# Patient Record
Sex: FEMALE | Race: WHITE | NOT HISPANIC OR LATINO | Employment: OTHER | URBAN - METROPOLITAN AREA
[De-identification: names, ages, dates, MRNs, and addresses within clinical notes are randomized per-mention and may not be internally consistent; named-entity substitution may affect disease eponyms.]

---

## 2017-01-23 ENCOUNTER — DOCUMENTATION ONLY (OUTPATIENT)
Dept: FAMILY MEDICINE | Facility: CLINIC | Age: 82
End: 2017-01-23

## 2017-01-23 NOTE — PROGRESS NOTES
Health Maintenance Due   Topic Date Due    TETANUS VACCINE  02/11/1946    DEXA SCAN  09/26/2016

## 2017-01-24 ENCOUNTER — OFFICE VISIT (OUTPATIENT)
Dept: FAMILY MEDICINE | Facility: CLINIC | Age: 82
End: 2017-01-24
Payer: MEDICARE

## 2017-01-24 VITALS
WEIGHT: 147.25 LBS | BODY MASS INDEX: 25.14 KG/M2 | RESPIRATION RATE: 16 BRPM | OXYGEN SATURATION: 95 % | DIASTOLIC BLOOD PRESSURE: 81 MMHG | HEIGHT: 64 IN | TEMPERATURE: 98 F | SYSTOLIC BLOOD PRESSURE: 130 MMHG | HEART RATE: 73 BPM

## 2017-01-24 DIAGNOSIS — N30.01 ACUTE CYSTITIS WITH HEMATURIA: Primary | ICD-10-CM

## 2017-01-24 DIAGNOSIS — N39.0 CHRONIC UTI: ICD-10-CM

## 2017-01-24 PROCEDURE — 87088 URINE BACTERIA CULTURE: CPT

## 2017-01-24 PROCEDURE — 87086 URINE CULTURE/COLONY COUNT: CPT

## 2017-01-24 PROCEDURE — 87077 CULTURE AEROBIC IDENTIFY: CPT

## 2017-01-24 PROCEDURE — 87186 SC STD MICRODIL/AGAR DIL: CPT

## 2017-01-24 PROCEDURE — 81002 URINALYSIS NONAUTO W/O SCOPE: CPT | Mod: S$GLB,,, | Performed by: NURSE PRACTITIONER

## 2017-01-24 PROCEDURE — 99213 OFFICE O/P EST LOW 20 MIN: CPT | Mod: 25,S$GLB,, | Performed by: NURSE PRACTITIONER

## 2017-01-24 RX ORDER — CIPROFLOXACIN 250 MG/1
250 TABLET, FILM COATED ORAL 2 TIMES DAILY
Qty: 20 TABLET | Refills: 0 | Status: SHIPPED | OUTPATIENT
Start: 2017-01-24 | End: 2017-02-03

## 2017-01-24 NOTE — MEDICAL/APP STUDENT
Subjective:       Patient ID: Ama Lang is a 88 y.o. female.    Chief Complaint: Urinary Tract Infection    HPI   Ms Lang is a 88 year old women who presents with a 5 day history of abdominal pain. Pt endorses  urgerncy and frequency. Pt denies dysuria. Pt states that she has chronic UTI. Pt has taken one dose of her daughters hyoscyamine sulfate 0.125 mg and felt better. Would like a prescription for that today.     Pt has had a flu shot this year.     Review of Systems   Constitutional: Negative for activity change, appetite change, chills and fatigue.   HENT: Negative.    Eyes: Negative.    Respiratory: Negative for cough, chest tightness and shortness of breath.    Cardiovascular: Negative for chest pain, palpitations and leg swelling.   Gastrointestinal: Positive for abdominal pain and diarrhea. Negative for constipation, nausea and vomiting.   Genitourinary: Positive for frequency and urgency. Negative for difficulty urinating, dysuria and flank pain.   Musculoskeletal: Negative.    Skin: Negative.    Allergic/Immunologic: Negative.    Neurological: Negative.    Hematological: Negative.    Psychiatric/Behavioral: Negative.          Objective:      Physical Exam   Constitutional: She is oriented to person, place, and time. She appears well-developed and well-nourished.   HENT:   Head: Normocephalic and atraumatic.   Eyes: Conjunctivae and EOM are normal. Pupils are equal, round, and reactive to light.   Neck: Normal range of motion. Neck supple.   Cardiovascular: Normal rate and regular rhythm.  Exam reveals no friction rub.    Murmur heard.  Pulmonary/Chest: Effort normal and breath sounds normal. No respiratory distress. She has no wheezes. She has no rales.   Abdominal: Soft. Bowel sounds are normal. She exhibits no distension. There is no tenderness.   Musculoskeletal: Normal range of motion.   Neurological: She is alert and oriented to person, place, and time.   Skin: Skin is warm and dry. She  is not diaphoretic.   Psychiatric: She has a normal mood and affect. Her behavior is normal.         Urine   Color - Cloudy straw  Sp gravity - 1.010  PH 6   Leukocytes 2+  Nitrites positive  Protein +3  Blood 250

## 2017-01-24 NOTE — MR AVS SNAPSHOT
[facility]  [address]  [place] [address]  Phone: [phone]  Fax: [phone]                  Ama Lang   2017 8:40 AM   Office Visit    Description:  Female : 1928   Provider:  KIMBERLY Pulido   Department:  [facility]           Reason for Visit     Urinary Tract Infection           Diagnoses this Visit        Comments    Acute cystitis with hematuria    -  Primary     Chronic UTI                To Do List           Future Appointments        Provider Department Dept Phone    2/3/2017 9:40 AM KIMBERLY Pulido [facility] [phone]    2017 11:15 AM Arlene Calhoun MD [facility] - Urology [phone]      Goals (5 Years of Data)     None      Follow-Up and Disposition     Return in about 10 days (around 2/3/2017).    Follow-up and Disposition History       These Medications        Disp Refills Start End    ciprofloxacin HCl (CIPRO) 250 MG tablet 20 tablet 0 2017 2/3/2017    Take 1 tablet (250 mg total) by mouth 2 (two) times daily. - Oral    Pharmacy: [place] [address] - [place] [address]  #: [phone]         [facility] On Call     [facility] On Call Nurse Care Line -  Assistance  Registered nurses in the [facility] On Call Center provide clinical advisement, health education, appointment booking, and other advisory services.  Call for this free service at [phone].             Medications           Message regarding Medications     Verify the changes and/or additions to your medication regime listed below are the same as discussed with your clinician today.  If any of these changes or additions are incorrect, please notify your healthcare provider.        START taking these NEW medications        Refills    ciprofloxacin HCl (CIPRO) 250 MG tablet 0    Sig: Take 1 tablet (250 mg total) by mouth 2 (two) times daily.    Class: Normal    Route: Oral          "  Verify that the below list of medications is an accurate representation of the medications you are currently taking.  If none reported, the list may be blank. If incorrect, please contact your healthcare provider. Carry this list with you in case of emergency.           Current Medications     ACETAMINOPHEN (TYLENOL 8 HOUR ORAL) Take 2 capsules by mouth daily as needed.    ACETAMINOPHEN WITH CODEINE (TYLENOL-CODEINE #3 ORAL) Take by mouth every 4 to 6 hours as needed.    apixaban (ELIQUIS) 2.5 mg Tab Take 2.5 mg by mouth 2 (two) times daily.     aspirin (ECOTRIN) 81 MG EC tablet Take 81 mg by mouth once daily.    atenolol (TENORMIN) 50 MG tablet Take 1 tablet (50 mg total) by mouth 2 (two) times daily.    CALCIUM ORAL Take 1 tablet by mouth once daily.    levothyroxine (SYNTHROID) 50 MCG tablet TAKE 1 TABLET BY MOUTH DAILY    lisinopril 10 MG tablet Take 10 mg by mouth once daily.    multivitamin (ONE DAILY MULTIVITAMIN) per tablet Take 1 tablet by mouth once daily.    PATADAY 0.2 % Drop INSTILL 1 DROP IN EACH EYE ONCE DAILY    ranitidine (ZANTAC) 150 MG capsule Take 1 capsule (150 mg total) by mouth 2 (two) times daily.    simvastatin (ZOCOR) 20 MG tablet TAKE 1 TABLET (20 MG TOTAL) BY MOUTH EVERY EVENING.    triamcinolone acetonide 0.025% (KENALOG) 0.025 % cream 1 application 2 (two) times daily as needed. Apply to affected area    VIT C/VIT E/LUTEIN/MIN/OMEGA-3 (OCUVITE ORAL) Take 1 tablet by mouth once daily.    ciprofloxacin HCl (CIPRO) 250 MG tablet Take 1 tablet (250 mg total) by mouth 2 (two) times daily.           Clinical Reference Information           Vital Signs - Last Recorded  Most recent update: 1/24/2017  9:04 AM by Pippa Mcclain MA    BP Pulse Temp Resp Ht Wt    130/81 (BP Location: Right arm, Patient Position: Sitting, BP Method: Automatic) 73 97.7 °F (36.5 °C) (Oral) 16 5' 4" (1.626 m) 66.8 kg (147 lb 4.3 oz)    SpO2 BMI             95% 25.28 kg/m2         Blood Pressure          Most " Recent Value    BP  130/81      Allergies as of 1/24/2017     No Known Drug Allergies      Immunizations Administered on Date of Encounter - 1/24/2017     None      Orders Placed During Today's Visit      Normal Orders This Visit    Ambulatory referral to Urology     POCT urine dipstick without microscope     Urine culture

## 2017-01-24 NOTE — PROGRESS NOTES
Subjective:       Patient ID: Ama Lang is a 88 y.o. female.    Chief Complaint: Urinary Tract Infection  Medical/FLORES Student  Cosign Needed   Encounter Date: 1/24/2017  Balbir Helms        Subjective:        Patient ID: Ama Lang is a 88 y.o. female.     Chief Complaint: Urinary Tract Infection     HPI   Ms Lang is a 88 year old women who presents with a 5 day history of abdominal pain. Pt endorses urgerncy and frequency. Pt denies dysuria. Pt states that she has chronic UTI. Pt has taken one dose of her daughters hyoscyamine sulfate 0.125 mg and felt better. Would like a prescription for that today.      Pt has had a flu shot this year.      Review of Systems   Constitutional: Negative for activity change, appetite change, chills and fatigue.   HENT: Negative.   Eyes: Negative.   Respiratory: Negative for cough, chest tightness and shortness of breath.   Cardiovascular: Negative for chest pain, palpitations and leg swelling.   Gastrointestinal: Positive for abdominal pain and diarrhea. Negative for constipation, nausea and vomiting.   Genitourinary: Positive for frequency and urgency. Negative for difficulty urinating, dysuria and flank pain.   Musculoskeletal: Negative.   Skin: Negative.   Allergic/Immunologic: Negative.   Neurological: Negative.   Hematological: Negative.   Psychiatric/Behavioral: Negative.          Objective:      Physical Exam   Constitutional: She is oriented to person, place, and time. She appears well-developed and well-nourished.   HENT:   Head: Normocephalic and atraumatic.   Eyes: Conjunctivae and EOM are normal. Pupils are equal, round, and reactive to light.   Neck: Normal range of motion. Neck supple.   Cardiovascular: Normal rate and regular rhythm. Exam reveals no friction rub.   Murmur heard.  Pulmonary/Chest: Effort normal and breath sounds normal. No respiratory distress. She has no wheezes. She has no rales.   Abdominal: Soft. Bowel sounds are normal. She  exhibits no distension. There is no tenderness.   Musculoskeletal: Normal range of motion. No pain or tenderness on percussion of spine and bilateral CVA.   Neurological: She is alert and oriented to person, place, and time.   Skin: Skin is warm and dry. She is not diaphoretic.   Psychiatric: She has a normal mood and affect. Her behavior is normal.          Urine   Color - Cloudy straw  Sp gravity - 1.010  PH 6   Leukocytes 2+  Nitrites positive  Protein +3  Blood 250             Electronically signed by Balbir Helms at 1/24/2017  9:40 AM                HPI  Review of Systems    Objective:      Physical Exam    Assessment:       1. Acute cystitis with hematuria    2. Chronic UTI        Plan:       Acute cystitis with hematuria  -     ciprofloxacin HCl (CIPRO) 250 MG tablet; Take 1 tablet (250 mg total) by mouth 2 (two) times daily.  Dispense: 20 tablet; Refill: 0  -     POCT urine dipstick without microscope  -     Urine culture    Chronic UTI  -     Ambulatory referral to Urology    I also saw patient face to face and agree with medical student's H&P.        10 days

## 2017-01-25 LAB
BILIRUB SERPL-MCNC: NEGATIVE MG/DL
BLOOD URINE, POC: 250
COLOR, POC UA: ABNORMAL
GLUCOSE UR QL STRIP: NORMAL
KETONES UR QL STRIP: NEGATIVE
LEUKOCYTE ESTERASE URINE, POC: ABNORMAL
NITRITE, POC UA: POSITIVE
PH, POC UA: 6
PROTEIN, POC: 30
SPECIFIC GRAVITY, POC UA: 1.01
UROBILINOGEN, POC UA: NEGATIVE

## 2017-01-26 LAB — BACTERIA UR CULT: NORMAL

## 2017-02-03 ENCOUNTER — OFFICE VISIT (OUTPATIENT)
Dept: FAMILY MEDICINE | Facility: CLINIC | Age: 82
End: 2017-02-03
Payer: MEDICARE

## 2017-02-03 ENCOUNTER — DOCUMENTATION ONLY (OUTPATIENT)
Dept: FAMILY MEDICINE | Facility: CLINIC | Age: 82
End: 2017-02-03

## 2017-02-03 VITALS
SYSTOLIC BLOOD PRESSURE: 133 MMHG | BODY MASS INDEX: 25.11 KG/M2 | RESPIRATION RATE: 16 BRPM | HEART RATE: 72 BPM | WEIGHT: 147.06 LBS | HEIGHT: 64 IN | OXYGEN SATURATION: 97 % | TEMPERATURE: 99 F | DIASTOLIC BLOOD PRESSURE: 80 MMHG

## 2017-02-03 DIAGNOSIS — N30.01 ACUTE CYSTITIS WITH HEMATURIA: Primary | ICD-10-CM

## 2017-02-03 DIAGNOSIS — N28.89 LEFT RENAL MASS: ICD-10-CM

## 2017-02-03 DIAGNOSIS — J30.1 NON-SEASONAL ALLERGIC RHINITIS DUE TO POLLEN: ICD-10-CM

## 2017-02-03 DIAGNOSIS — H10.13 ALLERGIC CONJUNCTIVITIS, BILATERAL: ICD-10-CM

## 2017-02-03 LAB
BILIRUB SERPL-MCNC: NEGATIVE MG/DL
BILIRUB UR QL STRIP: NEGATIVE
BLOOD URINE, POC: NEGATIVE
CLARITY UR REFRACT.AUTO: CLEAR
COLOR UR AUTO: YELLOW
COLOR, POC UA: ABNORMAL
GLUCOSE UR QL STRIP: NEGATIVE
GLUCOSE UR QL STRIP: NORMAL
HGB UR QL STRIP: NEGATIVE
KETONES UR QL STRIP: NEGATIVE
KETONES UR QL STRIP: NEGATIVE
LEUKOCYTE ESTERASE UR QL STRIP: ABNORMAL
LEUKOCYTE ESTERASE URINE, POC: ABNORMAL
MICROSCOPIC COMMENT: NORMAL
NITRITE UR QL STRIP: NEGATIVE
NITRITE, POC UA: ABNORMAL
PH UR STRIP: 6 [PH] (ref 5–8)
PH, POC UA: 5
PROT UR QL STRIP: NEGATIVE
PROTEIN, POC: ABNORMAL
RBC #/AREA URNS AUTO: 1 /HPF (ref 0–4)
SP GR UR STRIP: 1 (ref 1–1.03)
SPECIFIC GRAVITY, POC UA: 1.01
SQUAMOUS #/AREA URNS AUTO: 1 /HPF
URN SPEC COLLECT METH UR: ABNORMAL
UROBILINOGEN UR STRIP-ACNC: NEGATIVE EU/DL
UROBILINOGEN, POC UA: NORMAL
WBC #/AREA URNS AUTO: 3 /HPF (ref 0–5)

## 2017-02-03 PROCEDURE — 81001 URINALYSIS AUTO W/SCOPE: CPT

## 2017-02-03 PROCEDURE — 99213 OFFICE O/P EST LOW 20 MIN: CPT | Mod: S$GLB,,, | Performed by: NURSE PRACTITIONER

## 2017-02-03 PROCEDURE — 81002 URINALYSIS NONAUTO W/O SCOPE: CPT | Mod: S$GLB,,, | Performed by: NURSE PRACTITIONER

## 2017-02-03 RX ORDER — FLUTICASONE PROPIONATE 50 MCG
2 SPRAY, SUSPENSION (ML) NASAL DAILY
Qty: 16 G | Refills: 11 | Status: SHIPPED | OUTPATIENT
Start: 2017-02-03 | End: 2018-03-05 | Stop reason: SDUPTHER

## 2017-02-03 RX ORDER — AMOXICILLIN AND CLAVULANATE POTASSIUM 875; 125 MG/1; MG/1
1 TABLET, FILM COATED ORAL 2 TIMES DAILY
Qty: 20 TABLET | Refills: 0 | Status: SHIPPED | OUTPATIENT
Start: 2017-02-03 | End: 2017-02-13

## 2017-02-03 NOTE — PROGRESS NOTES
Health Maintenance Due   Topic Date Due    TETANUS VACCINE  02/11/1946    DEXA SCAN  02/11/1968

## 2017-02-03 NOTE — PROGRESS NOTES
Subjective:       Patient ID: Ama Lang is a 88 y.o. female.    Chief Complaint: Follow-up    Urinary Tract Infection    This is a recurrent (Discussed using preventative in the future and seeing urology. ) problem. The current episode started 1 to 4 weeks ago. Progression since onset: symptoms are better.  The patient is experiencing no pain. There has been no fever. She is not sexually active. There is no history of pyelonephritis. Pertinent negatives include no hematuria, nausea, urgency, vomiting or bubble bath use. Associated symptoms comments: fatigue. She has tried antibiotics (treated with cipro X 10 days, culture showed germ sensitive to cipro. ) for the symptoms.   Has appointment to see Dr. Helton next week. Lost 5 pounds in the past year, but weight is pretty stable currently.     Has allergic rhinitis, with eye itching as well. Uses pataday, but ran out. Uses vicks nasal spray.   Review of Systems   Gastrointestinal: Negative for nausea and vomiting.   Genitourinary: Negative for hematuria and urgency.       Objective:    U/A in clinic shows positive for leukocytes, trace protein, no nitrites and no blood.   Physical Exam   Constitutional: She is oriented to person, place, and time. She appears well-developed and well-nourished. No distress.   HENT:   Head: Normocephalic and atraumatic.   Eyes: Conjunctivae are normal. Right eye exhibits no discharge. Left eye exhibits no discharge. No scleral icterus.   Cardiovascular: Normal rate, regular rhythm and normal heart sounds.  Exam reveals no gallop and no friction rub.    No murmur heard.  Pulmonary/Chest: Effort normal and breath sounds normal. No respiratory distress. She has no wheezes. She has no rales.   Musculoskeletal:   No spinal or CVA tenderness with percussion.    Neurological: She is alert and oriented to person, place, and time.   Skin: Skin is warm and dry. She is not diaphoretic.   Psychiatric: She has a normal mood and affect.  Her behavior is normal.   Nursing note and vitals reviewed.      Assessment:       1. Acute cystitis with hematuria    2. Left renal mass        Plan:       Acute cystitis with hematuria  -     POCT urine dipstick without microscope  -     Urinalysis; Future; Expected date: 2/3/17  -     amoxicillin-clavulanate 875-125mg (AUGMENTIN) 875-125 mg per tablet; Take 1 tablet by mouth 2 (two) times daily.  Dispense: 20 tablet; Refill: 0    Left renal mass  -     US Retroperitoneal Complete (Kidney and; Future; Expected date: 2/3/17

## 2017-02-03 NOTE — MR AVS SNAPSHOT
[facility]  [address]  [place] [address]  Phone: [phone]  Fax: [phone]                  Ama Lang   2/3/2017 9:40 AM   Office Visit    Description:  Female : 1928   Provider:  KIMBERLY Pulido   Department:  [facility]           Reason for Visit     Follow-up           Diagnoses this Visit        Comments    Acute cystitis with hematuria    -  Primary     Left renal mass         Non-seasonal allergic rhinitis due to pollen         Allergic conjunctivitis, bilateral                To Do List           Future Appointments        Provider Department Dept Phone    2017 10:00 AM [facility]- Ultrasound [phone]    2017 11:15 AM Arlene Calhoun MD [facility] - Urology [phone]      Goals (5 Years of Data)     None      Follow-Up and Disposition     Return if symptoms worsen or fail to improve.    Follow-up and Disposition History       These Medications        Disp Refills Start End    amoxicillin-clavulanate 875-125mg (AUGMENTIN) 875-125 mg per tablet 20 tablet 0 2/3/2017 2017    Take 1 tablet by mouth 2 (two) times daily. - Oral    Pharmacy: Dr. Tariff  - [facility] [address] Ph #: [phone]       PATADAY 0.2 % Drop 2.5 mL 11 2/3/2017     INSTILL 1 DROP IN EACH EYE ONCE DAILY    Pharmacy: Dr. Tariff  - [facility] [address] [address] Ph #: [phone]       fluticasone (FLONASE) 50 mcg/actuation nasal spray 16 g 11 2/3/2017     2 sprays by Each Nare route once daily. - Each Nare    Pharmacy: Dr. Tariff  - [facility] [address] [facility] [address] Ph #: [phone]         [facility] On Call     [facility] On Call Nurse Care Line -  Assistance  Registered nurses in the [facility] On Call Center provide clinical advisement, health education, appointment booking, and other advisory services.  Call for this free service at [phone].             Medications            Message regarding Medications     Verify the changes and/or additions to your medication regime listed below are the same as discussed with your clinician today.  If any of these changes or additions are incorrect, please notify your healthcare provider.        START taking these NEW medications        Refills    amoxicillin-clavulanate 875-125mg (AUGMENTIN) 875-125 mg per tablet 0    Sig: Take 1 tablet by mouth 2 (two) times daily.    Class: Normal    Route: Oral    fluticasone (FLONASE) 50 mcg/actuation nasal spray 11    Si sprays by Each Nare route once daily.    Class: Normal    Route: Each Nare      STOP taking these medications     ciprofloxacin HCl (CIPRO) 250 MG tablet Take 1 tablet (250 mg total) by mouth 2 (two) times daily.           Verify that the below list of medications is an accurate representation of the medications you are currently taking.  If none reported, the list may be blank. If incorrect, please contact your healthcare provider. Carry this list with you in case of emergency.           Current Medications     ACETAMINOPHEN (TYLENOL 8 HOUR ORAL) Take 2 capsules by mouth daily as needed.    ACETAMINOPHEN WITH CODEINE (TYLENOL-CODEINE #3 ORAL) Take by mouth every 4 to 6 hours as needed.    apixaban (ELIQUIS) 2.5 mg Tab Take 2.5 mg by mouth 2 (two) times daily.     aspirin (ECOTRIN) 81 MG EC tablet Take 81 mg by mouth once daily.    atenolol (TENORMIN) 50 MG tablet Take 1 tablet (50 mg total) by mouth 2 (two) times daily.    CALCIUM ORAL Take 1 tablet by mouth once daily.    levothyroxine (SYNTHROID) 50 MCG tablet TAKE 1 TABLET BY MOUTH DAILY    lisinopril 10 MG tablet Take 10 mg by mouth once daily.    multivitamin (ONE DAILY MULTIVITAMIN) per tablet Take 1 tablet by mouth once daily.    PATADAY 0.2 % Drop INSTILL 1 DROP IN EACH EYE ONCE DAILY    ranitidine (ZANTAC) 150 MG capsule Take 1 capsule (150 mg total) by mouth 2 (two) times daily.    simvastatin (ZOCOR) 20 MG tablet TAKE  "1 TABLET (20 MG TOTAL) BY MOUTH EVERY EVENING.    triamcinolone acetonide 0.025% (KENALOG) 0.025 % cream 1 application 2 (two) times daily as needed. Apply to affected area    VIT C/VIT E/LUTEIN/MIN/OMEGA-3 (OCUVITE ORAL) Take 1 tablet by mouth once daily.    amoxicillin-clavulanate 875-125mg (AUGMENTIN) 875-125 mg per tablet Take 1 tablet by mouth 2 (two) times daily.    fluticasone (FLONASE) 50 mcg/actuation nasal spray 2 sprays by Each Nare route once daily.           Clinical Reference Information           Your Vitals Were     BP Pulse Temp Resp Height Weight    133/80 (BP Location: Right arm, Patient Position: Sitting, BP Method: Automatic) 72 98.6 °F (37 °C) (Oral) 16 5' 4" (1.626 m) 66.7 kg (147 lb 0.8 oz)    SpO2 BMI             97% 25.24 kg/m2         Blood Pressure          Most Recent Value    BP  133/80      Allergies as of 2/3/2017     No Known Drug Allergies      Immunizations Administered on Date of Encounter - 2/3/2017     None      Orders Placed During Today's Visit      Normal Orders This Visit    POCT urine dipstick without microscope     Future Labs/Procedures Expected by Expires    Urinalysis  2/3/2017 4/4/2018    US Retroperitoneal Complete (Kidney and  2/3/2017 2/3/2018      Language Assistance Services     ATTENTION: Language assistance services are available, free of charge. Please call [phone].      ATENCIÓN: Si habla español, tiene a marmolejo disposición servicios gratuitos de asistencia lingüística. Llame al [phone].     CHÚ Ý: N?u b?n nói Ti?ng Vi?t, có các d?ch v? h? tr? ngôn ng? mi?n phí dành cho b?n. G?i s? [phone].         [facility] complies with applicable Federal civil rights laws and does not discriminate on the basis of race, color, national origin, age, disability, or sex.        "

## 2017-02-06 ENCOUNTER — HOSPITAL ENCOUNTER (OUTPATIENT)
Dept: RADIOLOGY | Facility: CLINIC | Age: 82
Discharge: HOME | End: 2017-02-06
Attending: NURSE PRACTITIONER
Payer: MEDICARE

## 2017-02-06 DIAGNOSIS — E03.9 ACQUIRED HYPOTHYROIDISM: ICD-10-CM

## 2017-02-06 DIAGNOSIS — N28.89 LEFT RENAL MASS: ICD-10-CM

## 2017-02-06 PROCEDURE — 76770 US EXAM ABDO BACK WALL COMP: CPT | Mod: 26,,, | Performed by: RADIOLOGY

## 2017-02-06 PROCEDURE — 76770 US EXAM ABDO BACK WALL COMP: CPT | Mod: TC,PO

## 2017-02-06 RX ORDER — LEVOTHYROXINE SODIUM 50 UG/1
TABLET ORAL
Qty: 90 TABLET | Refills: 2 | Status: SHIPPED | OUTPATIENT
Start: 2017-02-06 | End: 2017-11-06 | Stop reason: SDUPTHER

## 2017-02-08 ENCOUNTER — TELEPHONE (OUTPATIENT)
Dept: UROLOGY | Facility: CLINIC | Age: 82
End: 2017-02-08

## 2017-02-08 NOTE — TELEPHONE ENCOUNTER
----- Message from Janet Lee sent at 2/8/2017  9:26 AM CST -----  Pt called asking for a call back regarding a UTI/pls call back at [phone]

## 2017-02-16 ENCOUNTER — OFFICE VISIT (OUTPATIENT)
Dept: UROLOGY | Facility: CLINIC | Age: 82
End: 2017-02-16
Payer: MEDICARE

## 2017-02-16 VITALS
SYSTOLIC BLOOD PRESSURE: 136 MMHG | BODY MASS INDEX: 25.03 KG/M2 | HEART RATE: 71 BPM | HEIGHT: 64 IN | WEIGHT: 146.63 LBS | DIASTOLIC BLOOD PRESSURE: 86 MMHG | TEMPERATURE: 98 F

## 2017-02-16 DIAGNOSIS — N28.89 RENAL MASS: ICD-10-CM

## 2017-02-16 DIAGNOSIS — N39.0 RECURRENT UTI: Primary | ICD-10-CM

## 2017-02-16 PROCEDURE — 87077 CULTURE AEROBIC IDENTIFY: CPT

## 2017-02-16 PROCEDURE — 99204 OFFICE O/P NEW MOD 45 MIN: CPT | Mod: S$PBB,,, | Performed by: UROLOGY

## 2017-02-16 PROCEDURE — 87186 SC STD MICRODIL/AGAR DIL: CPT

## 2017-02-16 PROCEDURE — 99214 OFFICE O/P EST MOD 30 MIN: CPT | Mod: PBBFAC,PO | Performed by: UROLOGY

## 2017-02-16 PROCEDURE — 81002 URINALYSIS NONAUTO W/O SCOPE: CPT | Mod: PBBFAC,PO | Performed by: UROLOGY

## 2017-02-16 PROCEDURE — 87086 URINE CULTURE/COLONY COUNT: CPT

## 2017-02-16 PROCEDURE — 87088 URINE BACTERIA CULTURE: CPT

## 2017-02-16 PROCEDURE — 99999 PR PBB SHADOW E&M-EST. PATIENT-LVL IV: CPT | Mod: PBBFAC,,, | Performed by: UROLOGY

## 2017-02-16 NOTE — LETTER
February 16, 2017      Monica Atkinson, APRN  [address]  [facility] [address]           [facility] - Urology  [address]  [facility] [address]  Phone: [phone]          Patient: Ama Lang   MR Number: [id number]   YOB: 1928   Date of Visit: 2/16/2017       Dear Monica Atkinson:    Thank you for referring Ama Lang to me for evaluation. Attached you will find relevant portions of my assessment and plan of care.    If you have questions, please do not hesitate to call me. I look forward to following Ama Lang along with you.    Sincerely,    Arlene Calhoun MD    Enclosure  CC:  No Recipients    If you would like to receive this communication electronically, please contact [email or web address] or [phone] to request more information on [employer] access.    For providers and/or their staff who would like to refer a patient to [facility], please contact us through our one-stop-shop provider referral line, [facility], at [phone].    If you feel you have received this communication in error or would no longer like to receive these types of communications, please e-mail [email or web address]

## 2017-02-16 NOTE — PROGRESS NOTES
[facility] Note - [facility]  Staff: MD Unique    Referring provider and please cc: Monica Atkinson  PCP: same    [facility]: inactive    Chief Complaint: recurrent uti    Subjective:        HPI: Ama Lang is a 89 y.o. female presents with     Recurrent uti  This pt has not been seen by  since 10/3/13. She has a h/o recurrent uti's that was being managed with doxycyline 100mg po daily for suppression. When she has a sx she has frequency, lower back pain. No longer on daily antibiotics. Has occ diarrhea that can sometimes prompt a uti. She's never tried a hormone cream. She had a cystoscopy years ago per pt. She states she has no difficulty emptying her bladder.  Last year she had a uti monthly, culture proven, they were all symptomatic.     oab  She has nocturia 3-4x a night. During the day she has minimal frequency. She has urge incontinence at night but ok during the day. Prior to bedtime she drinks water with medication. occ wakes up snoring.     Left renal mass  She also had a questionable left renall mass seen on us in 2013. Recent us showed no significant change in this. No flank pain.     ECOG Status: 0    G5, P 5, vaginal   Gross HematuriaNo       REVIEW OF SYSTEMS:  General ROS: no fevers, no chills  Psychological ROS: no depression  Endocrine ROS: no heat or cold  Respiratory ROS: no SOB  Cardiovascular ROS: no CP  Gastrointestinal ROS: no abdominal pain, no constipation, no diarrhea, noBRBPR  Musculoskeletal ROS: no muscle pain  Neurological ROS: no headaches  Dermatological ROS: no rashes  HEENT: no glasses, no sinus   ROS: per HPI     PMHx:  Past Medical History   Diagnosis Date    *Atrial fibrillation      Dr. Mullen    Cataract     Diverticulitis     Hyperlipidemia     Hypertension     Hypothyroidism      Kidney stones: No    PSHx:  Past Surgical History   Procedure Laterality Date    Eye surgery      Cystoscopy  2008     Dr. Nguyen    Dilation and  "curettage of uterus      Tubal ligation      Colonoscopy  2003     Dr. Santoyo     Urologic or Gynecologic Surgery: no     Stents/Valves/Foreign Bodies: No  Cardiac Evaluation: Yes -for "leaking valve" and afib    Fam Hx:   malignancies: No , gyn malignancies: Yes - mother  a 59 of female cancer .   kidney stones: Yes - son     Soc Hx:  No tobacco.   No alcohol  Lives in [place] with her daughter who has huntingtons dz  :   Children: 5, daughters  Occupation: stay at home mom    Allergies:  No known drug allergies     Medications: reviewed   Anticoagulation: Yes - eliquis daily    Objective:     Vitals:    17 1503   BP: 136/86   Pulse: 71   Temp: 98 °F (36.7 °C)     Very alert and knowledgeable     General:WDWN in NAD  Eyes: PERRLA, normal conjunctiva  Respiratory: no increased work on breathing, clear to auscultation  Cardiovascular: regular rate and rhythm. No obvious extremity edema.  GI: no palpation of masses. No tenderness. No hepatosplenomegaly to palpation.  Musculoskeletal: normal range of motion of bilateral upper extremities. Normal muscle strength and tone.  Skin: no obvious rashes or lesions. No tightening of skin noted.  Neurologic: CN grossly normal. Normal sensation.   Psychiatric: awake, alert and oriented x 3. Mood and affect normal. Cooperative.    Pelvic exam  deferred    LABS REVIEW:  UA today: 1.015/+leuk/tr blood - send urine for culture, but asx  UCx:   17 E.coli  16 E.coli  16  E.coli  16 E.coli  16 C.freundi  3/22/16 E.cokli  16 E.coli  11/25/15 Ng    Cr:   Lab Results   Component Value Date    CREATININE 0.9 2015       PATHOLOGY REVIEW:  Urine cytology 6/3/13  URINE, VOIDED CLEAN CATCH.             - BENIGN EPITHELIAL CELLS AND FEW NEUTROPHILS PRESENT.             - NO DYSPLASTIC OR MALIGNANT CELLS IDENTIFIED  Urine cytology 12  Negative for malignant cells.    RADIOGRAPHIC REVIEW:  rbus 17  Right kidney measures " 10.4 x 4.9 x 5.5 cm the resistive index is 0.69.  There is no hydronephrosis.  Left kidney is partially obscured by bowel gas but as visualized is measured at 9.6 x 3.5 x 4.2 cm and the resistive index is 0.66.  There is no hydronephrosis.  There is slight irregular contour of the left kidney which could be scarring or fetal lobulations.  The lobulations suggesting approximately 2.7 x 1.5 x 1.3 cm left renal mass on the prior exam is less apparent today with the left kidney less well visualized but as visualized does not appear significantly changed.      Assessment:       1. Recurrent UTI    2. Renal mass          Plan:     Pt wants to hold off on a ctu to evaluate the left kidney for mass or for source of recurrent utis. This is reasonable as she is 88 yo. If mass is present then it is <3cm and amenable to observation. It does not appear that she has any abscesses or stones.     Send urine for culture - but pt is asymptomatic, will hold off on treating until we do her cystoscopy    Schedule cystoscopy to evaluate for any bladder abnormalties. At that time will also have nurses check residual. Will do vaginal exam and teach pt how to use hormone cream. No h/o abnormal paps, gynecologic cancers.  Will wait to write hormone cream until I do vaginal exam.     Asked pt to start probiotic (she thnks this makes her nocturia worse).      F/u for cystoscopy, vaginal exam, residual by in and out cath    Arlene Calhoun MD

## 2017-02-17 LAB
BILIRUB SERPL-MCNC: ABNORMAL MG/DL
BLOOD URINE, POC: ABNORMAL
COLOR, POC UA: ABNORMAL
GLUCOSE UR QL STRIP: ABNORMAL
KETONES UR QL STRIP: ABNORMAL
LEUKOCYTE ESTERASE URINE, POC: ABNORMAL
NITRITE, POC UA: ABNORMAL
PH, POC UA: 5
PROTEIN, POC: ABNORMAL
SPECIFIC GRAVITY, POC UA: 1.01
UROBILINOGEN, POC UA: ABNORMAL

## 2017-02-20 ENCOUNTER — TELEPHONE (OUTPATIENT)
Dept: UROLOGY | Facility: CLINIC | Age: 82
End: 2017-02-20

## 2017-02-20 LAB — BACTERIA UR CULT: NORMAL

## 2017-02-20 RX ORDER — CIPROFLOXACIN 500 MG/1
500 TABLET ORAL 2 TIMES DAILY
Qty: 10 TABLET | Refills: 0 | Status: SHIPPED | OUTPATIENT
Start: 2017-02-20 | End: 2017-02-24 | Stop reason: ALTCHOICE

## 2017-02-20 NOTE — TELEPHONE ENCOUNTER
Spoke with patient cancelled cystoscopy will like to follow up with Dr.Long carson. Will call back to reschedule

## 2017-02-21 ENCOUNTER — TELEPHONE (OUTPATIENT)
Dept: FAMILY MEDICINE | Facility: CLINIC | Age: 82
End: 2017-02-21

## 2017-02-21 NOTE — TELEPHONE ENCOUNTER
----- Message from Janet Lee sent at 2/20/2017  2:14 PM CST -----  Pt called asking for a call back to from the nurse/pls call back at [phone]

## 2017-02-21 NOTE — TELEPHONE ENCOUNTER
vinod  Pt states she is coming in Friday to see subha cheema to discuss bladder infection treatments.  She was not happy with dr rojas so she wants subha to treat her.

## 2017-02-24 ENCOUNTER — DOCUMENTATION ONLY (OUTPATIENT)
Dept: FAMILY MEDICINE | Facility: CLINIC | Age: 82
End: 2017-02-24

## 2017-02-24 ENCOUNTER — OFFICE VISIT (OUTPATIENT)
Dept: FAMILY MEDICINE | Facility: CLINIC | Age: 82
End: 2017-02-24
Payer: MEDICARE

## 2017-02-24 VITALS
OXYGEN SATURATION: 97 % | DIASTOLIC BLOOD PRESSURE: 98 MMHG | WEIGHT: 149.5 LBS | BODY MASS INDEX: 25.52 KG/M2 | SYSTOLIC BLOOD PRESSURE: 182 MMHG | HEIGHT: 64 IN | HEART RATE: 77 BPM | TEMPERATURE: 98 F

## 2017-02-24 DIAGNOSIS — N39.0 CHRONIC UTI: ICD-10-CM

## 2017-02-24 DIAGNOSIS — I10 ESSENTIAL HYPERTENSION: Primary | ICD-10-CM

## 2017-02-24 LAB
BILIRUB SERPL-MCNC: NORMAL MG/DL
BLOOD URINE, POC: NORMAL
COLOR, POC UA: YELLOW
GLUCOSE UR QL STRIP: NORMAL
KETONES UR QL STRIP: NORMAL
LEUKOCYTE ESTERASE URINE, POC: NORMAL
NITRITE, POC UA: NORMAL
PH, POC UA: 7
PROTEIN, POC: NORMAL
SPECIFIC GRAVITY, POC UA: 1.01
UROBILINOGEN, POC UA: NORMAL

## 2017-02-24 PROCEDURE — 81002 URINALYSIS NONAUTO W/O SCOPE: CPT | Mod: S$GLB,,, | Performed by: NURSE PRACTITIONER

## 2017-02-24 PROCEDURE — 99213 OFFICE O/P EST LOW 20 MIN: CPT | Mod: 25,S$GLB,, | Performed by: NURSE PRACTITIONER

## 2017-02-24 RX ORDER — METHENAMINE HIPPURATE 1000 MG/1
1 TABLET ORAL 2 TIMES DAILY
Qty: 60 TABLET | Refills: 11 | Status: SHIPPED | OUTPATIENT
Start: 2017-02-24 | End: 2017-12-19

## 2017-02-24 NOTE — MEDICAL/APP STUDENT
Subjective:       Patient ID: Ama Lang is a 89 y.o. female.    Chief Complaint: Follow-up    HPI     Ms Lang is a 89 year old women who is presenting to clinic for follow up with recurrent UTIs and bladder infections. Pt was seen 2/3 in clinic with recurrent UTI and prescribed 10 days of Augmentin. Pt states that she does not have any pain on urination. Pt denies hesitancy, frequency or dysuria. Pt denies any problems today. Pt saw urology with Dr Calhoun on 2/16 and was recommended to have cystoscopy, which pt has cancelled.     Review of Systems   Constitutional: Negative for chills, fatigue and fever.   Respiratory: Negative for shortness of breath.    Cardiovascular: Negative for chest pain.   Gastrointestinal: Negative for constipation, diarrhea, nausea and vomiting.   Genitourinary: Negative for difficulty urinating, dysuria, flank pain, frequency, hematuria and urgency.   Psychiatric/Behavioral: Negative for confusion.       Objective:      Physical Exam   Constitutional: She is oriented to person, place, and time. She appears well-developed and well-nourished.   HENT:   Head: Normocephalic and atraumatic.   Eyes: Conjunctivae are normal. Pupils are equal, round, and reactive to light.   Neck: Normal range of motion. Neck supple.   Cardiovascular: Normal rate and regular rhythm.  Exam reveals no gallop and no friction rub.    No murmur heard.  Pulmonary/Chest: Effort normal and breath sounds normal. No respiratory distress. She has no wheezes. She has no rales. She exhibits no tenderness.   Abdominal: Soft. Bowel sounds are normal. She exhibits no distension. There is no tenderness.   Musculoskeletal: Normal range of motion.   Neurological: She is alert and oriented to person, place, and time.   Skin: Skin is warm and dry. No rash noted. No erythema.   Psychiatric: She has a normal mood and affect. Her behavior is normal. Judgment and thought content normal.   Nursing note and vitals  reviewed.      Balbir Helms MS-4  [facility] 2016

## 2017-02-24 NOTE — PROGRESS NOTES
"Subjective:       Patient ID: Ama Lang is a 89 y.o. female.    Chief Complaint: Follow-up  Medical/FLORES Student  Cosign Needed   Encounter Date: 2/24/2017  Balbir Helms        Subjective:        Patient ID: Ama Lang is a 89 y.o. female.     Chief Complaint: Follow-up     HPI      Ms Lang is a 89 year old women who is presenting to clinic for follow up with recurrent UTIs and bladder infections. Pt was seen 2/3 in clinic with recurrent UTI and prescribed 10 days of Augmentin. Pt states that she does not have any pain on urination. Pt denies hesitancy, frequency or dysuria. Pt denies any problems today. Pt saw urology with Dr Calhoun on 2/16 and was recommended to have cystoscopy, which pt has cancelled.      Hypertension is chronic, usually well controlled, but very high today. Denies any headache or chest pain today. Admits to feeling "light headed" today. But not sure she remembered to take her blood pressure medication today.     Review of Systems   Constitutional: Negative for chills, fatigue and fever.   Respiratory: Negative for shortness of breath.   Cardiovascular: Negative for chest pain.   Gastrointestinal: Negative for constipation, diarrhea, nausea and vomiting.   Genitourinary: Negative for difficulty urinating, dysuria, flank pain, frequency, hematuria and urgency.   Psychiatric/Behavioral: Negative for confusion.       Objective:    U/A negative  Physical Exam   Constitutional: She is oriented to person, place, and time. She appears well-developed and well-nourished.   HENT:   Head: Normocephalic and atraumatic.   Eyes: Conjunctivae are normal. Pupils are equal, round, and reactive to light.   Neck: Normal range of motion. Neck supple.   Cardiovascular: Normal rate and regular rhythm. Exam reveals no gallop and no friction rub.   No murmur heard.  Pulmonary/Chest: Effort normal and breath sounds normal. No respiratory distress. She has no wheezes. She has no rales. She exhibits " no tenderness.   Abdominal: Soft. Bowel sounds are normal. She exhibits no distension. There is no tenderness.   Musculoskeletal: Normal range of motion.   Neurological: She is alert and oriented to person, place, and time.   Skin: Skin is warm and dry. No rash noted. No erythema.   Psychiatric: She has a normal mood and affect. Her behavior is normal. Judgment and thought content normal.   Nursing note and vitals reviewed.      Balbir Helms MS-4  [facility] 2016      Electronically signed by Balbir Helms at 2/24/2017  3:47 PM        I also saw patient face to face and agree with medical student's H&P , I have diagnosed and written treatment plan.             HPI  Review of Systems    Objective:      Physical Exam    Assessment:       1. Essential hypertension    2. Chronic UTI        Plan:     Essential hypertension  -     POCT urine dipstick without microscope    Chronic UTI      Patient does not wish to have cystoscopy, she understands that it could find a possible cancerous tumor which could cause repeated UTIs. She understands that if she has a bladder cancer, and it is not diagnosed, it could kill her.  She only wants to have a preventative medication which will not require her to be on antibiotics frequently.  As patient is not sure whether or not she took her blood pressure medication, and has not had a problem with it recently, we will recheck blood pressure in 3 days before changing her medications.

## 2017-02-24 NOTE — MR AVS SNAPSHOT
[facility]  [address] [address] [address]  Phone: [phone]  Fax: [phone]                  Ama Lang   2017 2:40 PM   Office Visit    Description:  Female : 1928   Provider:  KIMBERLY Pulido   Department:  [facility]           Reason for Visit     Follow-up           Diagnoses this Visit        Comments    Essential hypertension    -  Primary     Chronic UTI                To Do List           Future Appointments        Provider Department Dept Phone    2017 11:00 AM Jimi Jacob MD [facility] [phone]      Your Future Surgeries/Procedures     Mar 06, 2017   Surgery with Arlene Calhoun MD   [facility]    [address] [place] [id number] [phone]              Goals (5 Years of Data)     None      Follow-Up and Disposition     Return in about 3 days (around 2017), or if symptoms worsen or fail to improve.    Follow-up and Disposition History       These Medications        Disp Refills Start End    methenamine (HIPREX) 1 gram Tab 60 tablet 11 2017     Take 1 tablet (1 g total) by mouth 2 (two) times daily. Take with 500 mg. Of vitamin C and 8 ounces of fluid - Oral    Pharmacy: [facility] [address] [address] [facility]  #: [phone]         [facility]     [facility] -  Assistance  Registered nurses in the [facility] provide clinical advisement, health education, appointment booking, and other advisory services.  Call for this free service at [phone].             Medications           Message regarding Medications     Verify the changes and/or additions to your medication regime listed below are the same as discussed with your clinician today.  If any of these changes or additions are incorrect, please notify your healthcare provider.         START taking these NEW medications        Refills    methenamine (HIPREX) 1 gram Tab 11    Sig: Take 1 tablet (1 g total) by mouth 2 (two) times daily. Take with 500 mg. Of vitamin C and 8 ounces of fluid    Class: Normal    Route: Oral      STOP taking these medications     ciprofloxacin HCl (CIPRO) 500 MG tablet Take 1 tablet (500 mg total) by mouth 2 (two) times daily.           Verify that the below list of medications is an accurate representation of the medications you are currently taking.  If none reported, the list may be blank. If incorrect, please contact your healthcare provider. Carry this list with you in case of emergency.           Current Medications     ACETAMINOPHEN (TYLENOL 8 HOUR ORAL) Take 2 capsules by mouth daily as needed.    ACETAMINOPHEN WITH CODEINE (TYLENOL-CODEINE #3 ORAL) Take by mouth every 4 to 6 hours as needed.    apixaban (ELIQUIS) 2.5 mg Tab Take 2.5 mg by mouth 2 (two) times daily.     aspirin (ECOTRIN) 81 MG EC tablet Take 81 mg by mouth once daily.    atenolol (TENORMIN) 50 MG tablet Take 1 tablet (50 mg total) by mouth 2 (two) times daily.    CALCIUM ORAL Take 1 tablet by mouth once daily.    fluticasone (FLONASE) 50 mcg/actuation nasal spray 2 sprays by Each Nare route once daily.    levothyroxine (SYNTHROID) 50 MCG tablet TAKE 1 TABLET BY MOUTH DAILY    lisinopril 10 MG tablet Take 10 mg by mouth once daily.    multivitamin (ONE DAILY MULTIVITAMIN) per tablet Take 1 tablet by mouth once daily.    PATADAY 0.2 % Drop INSTILL 1 DROP IN EACH EYE ONCE DAILY    ranitidine (ZANTAC) 150 MG capsule Take 1 capsule (150 mg total) by mouth 2 (two) times daily.    simvastatin (ZOCOR) 20 MG tablet TAKE 1 TABLET (20 MG TOTAL) BY MOUTH EVERY EVENING.    triamcinolone acetonide 0.025% (KENALOG) 0.025 % cream 1 application 2 (two) times daily as needed. Apply to affected area    VIT C/VIT E/LUTEIN/MIN/OMEGA-3 (OCUVITE ORAL) Take 1 tablet by mouth once daily.    methenamine (HIPREX) 1 gram  Tab Take 1 tablet (1 g total) by mouth 2 (two) times daily. Take with 500 mg. Of vitamin C and 8 ounces of fluid           Clinical Reference Information           Your Vitals Were     BP                   182/98 (BP Location: Left arm, Patient Position: Sitting, BP Method: Manual)           Blood Pressure          Most Recent Value    BP  (!)  182/98      Allergies as of 2/24/2017     No Known Drug Allergies      Immunizations Administered on Date of Encounter - 2/24/2017     None      Orders Placed During Today's Visit      Normal Orders This Visit    POCT urine dipstick without microscope       Instructions    Take 500 mg. Vitamin C with hiprex twice a day and drink plenty of fluids.        Language Assistance Services     ATTENTION: Language assistance services are available, free of charge. Please call [phone].      ATENCIÓN: Si danishala lali, tiene a marmolejo disposición servicios gratuitos de asistencia lingüística. Llame al [phone].     CHÚ Ý: N?u b?n nói Ti?ng Vi?t, có các d?ch v? h? tr? ngôn ng? mi?n phí dành cho b?n. G?i s? [phone].         [facility] complies with applicable Federal civil rights laws and does not discriminate on the basis of race, color, national origin, age, disability, or sex.

## 2017-02-24 NOTE — PROGRESS NOTES
Pre-Visit Chart Review  For Appointment Scheduled on 2/24/17    Health Maintenance Due   Topic Date Due    TETANUS VACCINE  02/11/1946    DEXA SCAN  02/11/1968    Zoster Vaccine  02/11/1988

## 2017-02-27 ENCOUNTER — CLINICAL SUPPORT (OUTPATIENT)
Dept: FAMILY MEDICINE | Facility: CLINIC | Age: 82
End: 2017-02-27
Payer: MEDICARE

## 2017-02-27 ENCOUNTER — DOCUMENTATION ONLY (OUTPATIENT)
Dept: FAMILY MEDICINE | Facility: CLINIC | Age: 82
End: 2017-02-27

## 2017-02-27 VITALS — DIASTOLIC BLOOD PRESSURE: 78 MMHG | SYSTOLIC BLOOD PRESSURE: 146 MMHG

## 2017-02-27 NOTE — PROGRESS NOTES
Pt presented to office for nurse visit BP check.  Pt asked to sit 5 min. Before manual BP to be taken.     1045    Left arm BP  160/90        Right arm BP  164/90    1100    Left arm /82          Right arm BP  146/78    Pt refused treatment at this time. Pt states she sees her cardiologist March 3, 2017 and he monitors her BP.   Dr. Jacob informed.     Copy of her vital signs flow sheet printer for her to bring to her cardiologist.

## 2017-02-27 NOTE — PROGRESS NOTES
Health Maintenance Due   Topic Date Due    TETANUS VACCINE  02[birth year]11/1946    DEXA SCAN  02/11/1968    Zoster Vaccine  02/11/1988

## 2017-03-15 ENCOUNTER — TELEPHONE (OUTPATIENT)
Dept: FAMILY MEDICINE | Facility: CLINIC | Age: 82
End: 2017-03-15

## 2017-03-15 NOTE — TELEPHONE ENCOUNTER
----- Message from Helen Tobar sent at 3/15/2017  1:33 PM CDT -----  Allegra with Dr. Mullen office requesting copy of patient's Lab results be faxed to [phone] or can call back at [phone] if any questions.

## 2017-04-25 ENCOUNTER — OFFICE VISIT (OUTPATIENT)
Dept: FAMILY MEDICINE | Facility: CLINIC | Age: 82
End: 2017-04-25
Payer: MEDICARE

## 2017-04-25 VITALS
HEIGHT: 64 IN | HEART RATE: 76 BPM | TEMPERATURE: 98 F | BODY MASS INDEX: 24.65 KG/M2 | OXYGEN SATURATION: 98 % | DIASTOLIC BLOOD PRESSURE: 82 MMHG | WEIGHT: 144.38 LBS | RESPIRATION RATE: 16 BRPM | SYSTOLIC BLOOD PRESSURE: 158 MMHG

## 2017-04-25 DIAGNOSIS — N30.01 ACUTE CYSTITIS WITH HEMATURIA: Primary | ICD-10-CM

## 2017-04-25 DIAGNOSIS — N95.2 ATROPHIC VAGINITIS: ICD-10-CM

## 2017-04-25 PROCEDURE — 99213 OFFICE O/P EST LOW 20 MIN: CPT | Mod: S$GLB,,, | Performed by: NURSE PRACTITIONER

## 2017-04-25 PROCEDURE — 81002 URINALYSIS NONAUTO W/O SCOPE: CPT | Mod: S$GLB,,, | Performed by: NURSE PRACTITIONER

## 2017-04-25 PROCEDURE — 87086 URINE CULTURE/COLONY COUNT: CPT

## 2017-04-25 RX ORDER — SULFAMETHOXAZOLE AND TRIMETHOPRIM 800; 160 MG/1; MG/1
1 TABLET ORAL 2 TIMES DAILY
Qty: 20 TABLET | Refills: 0 | Status: SHIPPED | OUTPATIENT
Start: 2017-04-25 | End: 2017-05-05

## 2017-04-25 RX ORDER — KETOCONAZOLE 20 MG/ML
SHAMPOO, SUSPENSION TOPICAL
Refills: 5 | COMMUNITY
Start: 2017-04-19 | End: 2022-09-06

## 2017-04-25 NOTE — PROGRESS NOTES
Subjective:       Patient ID: Ama Lang is a 89 y.o. female.    Chief Complaint: Urinary Tract Infection    Urinary Tract Infection    This is a recurrent problem. The current episode started in the past 7 days (about 3 days ago). The problem occurs intermittently. The quality of the pain is described as burning. Associated symptoms include nausea. Associated symptoms comments: fatigued. She has tried antibiotics (has been taking hyprex) for the symptoms. The treatment provided no relief. Her past medical history is significant for recurrent UTIs.     Review of Systems   Gastrointestinal: Positive for nausea.       Objective:    U/A positive for leukocytes, nitrites and blood  Physical Exam   Constitutional: She is oriented to person, place, and time. She appears well-developed and well-nourished. No distress.   HENT:   Head: Normocephalic and atraumatic.   Eyes: Conjunctivae are normal. Right eye exhibits no discharge. Left eye exhibits no discharge. No scleral icterus.   Cardiovascular: Normal rate, regular rhythm and normal heart sounds.  Exam reveals no gallop and no friction rub.    No murmur heard.  Pulmonary/Chest: Effort normal and breath sounds normal. No respiratory distress. She has no wheezes. She has no rales.   Musculoskeletal:   No spinal or CVA tenderness with percussion.    Neurological: She is alert and oriented to person, place, and time.   Skin: Skin is warm and dry. She is not diaphoretic.   Psychiatric: She has a normal mood and affect. Her behavior is normal.   Nursing note and vitals reviewed.      Assessment:       1. Acute cystitis with hematuria    2. Atrophic vaginitis        Plan:       Acute cystitis with hematuria  -     POCT urine dipstick without microscope  -     Urine culture  -     sulfamethoxazole-trimethoprim 800-160mg (BACTRIM DS) 800-160 mg Tab; Take 1 tablet by mouth 2 (two) times daily.  Dispense: 20 tablet; Refill: 0    Atrophic vaginitis  -     conjugated estrogens  (PREMARIN) vaginal cream; Use a pea size amount every night for a week, then cut back to twice a week.  Dispense: 30 g; Refill: 11      Do not use hyprex while on the bactrim (sulfamethoxazole-trimethoprim)

## 2017-04-25 NOTE — MR AVS SNAPSHOT
[facility]  [address] [address] [address]  Phone: [phone]  Fax: [phone]                  Ama Lang   2017 4:00 PM   Office Visit    Description:  Female : 1928   Provider:  KIMBERLY Pulido   Department:  [facility]           Reason for Visit     Urinary Tract Infection           Diagnoses this Visit        Comments    Acute cystitis with hematuria    -  Primary     Atrophic vaginitis                To Do List           Future Appointments        Provider Department Dept Phone    2017 3:20 PM KIMBERLY Pulido [facility] [phone]      Goals (5 Years of Data)     None      Follow-Up and Disposition     Return in about 10 days (around 2017).    Follow-up and Disposition History       These Medications        Disp Refills Start End    conjugated estrogens (PREMARIN) vaginal cream 30 g 11 2017     Use a pea size amount every night for a week, then cut back to twice a week.    Pharmacy: [facility] [address] [address] [facility] [address] Ph #: [phone]       sulfamethoxazole-trimethoprim 800-160mg (BACTRIM DS) 800-160 mg Tab 20 tablet 0 2017    Take 1 tablet by mouth 2 (two) times daily. - Oral    Pharmacy: [facility] [address] - [address] [address] Ph #: [phone]         [facility]     [facility] On [facility] Nurse Care Line -  Assistance  Unless otherwise directed by your provider, please contact [facility], our nurse care line that is available for  assistance.     Registered nurses in the [facility] provide: appointment scheduling, clinical advisement, health education, and other advisory services.  Call: [phone] (toll free)               Medications           Message regarding Medications     Verify the changes and/or additions to your medication regime listed below are the same as discussed with your clinician today.   If any of these changes or additions are incorrect, please notify your healthcare provider.        START taking these NEW medications        Refills    conjugated estrogens (PREMARIN) vaginal cream 11    Sig: Use a pea size amount every night for a week, then cut back to twice a week.    Class: Normal    sulfamethoxazole-trimethoprim 800-160mg (BACTRIM DS) 800-160 mg Tab 0    Sig: Take 1 tablet by mouth 2 (two) times daily.    Class: Normal    Route: Oral      STOP taking these medications     ACETAMINOPHEN WITH CODEINE (TYLENOL-CODEINE #3 ORAL) Take by mouth every 4 to 6 hours as needed.    aspirin (ECOTRIN) 81 MG EC tablet Take 81 mg by mouth once daily.    ranitidine (ZANTAC) 150 MG capsule Take 1 capsule (150 mg total) by mouth 2 (two) times daily.           Verify that the below list of medications is an accurate representation of the medications you are currently taking.  If none reported, the list may be blank. If incorrect, please contact your healthcare provider. Carry this list with you in case of emergency.           Current Medications     ACETAMINOPHEN (TYLENOL 8 HOUR ORAL) Take 2 capsules by mouth daily as needed.    apixaban (ELIQUIS) 2.5 mg Tab Take 2.5 mg by mouth 2 (two) times daily.     atenolol (TENORMIN) 50 MG tablet Take 1 tablet (50 mg total) by mouth 2 (two) times daily.    CALCIUM ORAL Take 1 tablet by mouth once daily.    fluticasone (FLONASE) 50 mcg/actuation nasal spray 2 sprays by Each Nare route once daily.    ketoconazole (NIZORAL) 2 % shampoo APPLY TO AFFECTED AREA ON THE SKIN 3 TIMES A DAY    levothyroxine (SYNTHROID) 50 MCG tablet TAKE 1 TABLET BY MOUTH DAILY    lisinopril 10 MG tablet Take 10 mg by mouth once daily.    methenamine (HIPREX) 1 gram Tab Take 1 tablet (1 g total) by mouth 2 (two) times daily. Take with 500 mg. Of vitamin C and 8 ounces of fluid    multivitamin (ONE DAILY MULTIVITAMIN) per tablet Take 1 tablet by mouth once daily.    PATADAY 0.2 % Drop INSTILL 1 DROP  "IN EACH EYE ONCE DAILY    simvastatin (ZOCOR) 20 MG tablet TAKE 1 TABLET (20 MG TOTAL) BY MOUTH EVERY EVENING.    triamcinolone acetonide 0.025% (KENALOG) 0.025 % cream 1 application 2 (two) times daily as needed. Apply to affected area    VIT C/VIT E/LUTEIN/MIN/OMEGA-3 (OCUVITE ORAL) Take 1 tablet by mouth once daily.    conjugated estrogens (PREMARIN) vaginal cream Use a pea size amount every night for a week, then cut back to twice a week.    sulfamethoxazole-trimethoprim 800-160mg (BACTRIM DS) 800-160 mg Tab Take 1 tablet by mouth 2 (two) times daily.           Clinical Reference Information           Your Vitals Were     BP Pulse Temp Resp Height Weight    158/82 (BP Location: Left arm, Patient Position: Sitting, BP Method: Manual) 76 98.3 °F (36.8 °C) (Oral) 16 5' 4" (1.626 m) 65.5 kg (144 lb 6.4 oz)    SpO2 BMI             98% 24.79 kg/m2         Blood Pressure          Most Recent Value    BP  (!)  158/82      Allergies as of 4/25/2017     No Known Drug Allergies      Immunizations Administered on Date of Encounter - 4/25/2017     None      Orders Placed During Today's Visit      Normal Orders This Visit    POCT urine dipstick without microscope     Urine culture       Language Assistance Services     ATTENTION: Language assistance services are available, free of charge. Please call [phone].      ATENCIÓN: Si habla español, tiene a marmolejo disposición servicios gratuitos de asistencia lingüística. Llame al [phone].     JENNIFER Ý: N?u b?n nói Ti?ng Vi?t, có các d?ch v? h? tr? ngôn ng? mi?n phí dành cho b?n. G?i s? [phone].         [facility] complies with applicable Federal civil rights laws and does not discriminate on the basis of race, color, national origin, age, disability, or sex.        "

## 2017-04-26 LAB — BACTERIA UR CULT: NO GROWTH

## 2017-04-27 LAB
BILIRUB SERPL-MCNC: NEGATIVE MG/DL
BLOOD URINE, POC: 250
COLOR, POC UA: ABNORMAL
GLUCOSE UR QL STRIP: NEGATIVE
KETONES UR QL STRIP: ABNORMAL
LEUKOCYTE ESTERASE URINE, POC: ABNORMAL
NITRITE, POC UA: POSITIVE
PH, POC UA: 6
PROTEIN, POC: 100
SPECIFIC GRAVITY, POC UA: 1
UROBILINOGEN, POC UA: NORMAL

## 2017-05-08 ENCOUNTER — OFFICE VISIT (OUTPATIENT)
Dept: FAMILY MEDICINE | Facility: CLINIC | Age: 82
End: 2017-05-08
Payer: MEDICARE

## 2017-05-08 VITALS
BODY MASS INDEX: 24.39 KG/M2 | OXYGEN SATURATION: 96 % | RESPIRATION RATE: 16 BRPM | WEIGHT: 142.88 LBS | SYSTOLIC BLOOD PRESSURE: 150 MMHG | HEART RATE: 69 BPM | TEMPERATURE: 98 F | DIASTOLIC BLOOD PRESSURE: 86 MMHG | HEIGHT: 64 IN

## 2017-05-08 DIAGNOSIS — N39.0 CHRONIC UTI: Primary | ICD-10-CM

## 2017-05-08 PROCEDURE — 81002 URINALYSIS NONAUTO W/O SCOPE: CPT | Mod: S$GLB,,, | Performed by: NURSE PRACTITIONER

## 2017-05-08 PROCEDURE — 99214 OFFICE O/P EST MOD 30 MIN: CPT | Mod: S$GLB,,, | Performed by: NURSE PRACTITIONER

## 2017-05-08 RX ORDER — SULFAMETHOXAZOLE AND TRIMETHOPRIM 800; 160 MG/1; MG/1
TABLET ORAL
Refills: 0 | COMMUNITY
Start: 2017-04-25 | End: 2017-05-08 | Stop reason: ALTCHOICE

## 2017-05-08 NOTE — PROGRESS NOTES
Subjective:       Patient ID: Ama Lang is a 89 y.o. female.    Chief Complaint: Follow-up    Urinary Tract Infection    This is a chronic (she was here about 1 1/2 weeks ago, had UTI symptoms and positive dipstick, but on culture, no growth. ) problem. The current episode started more than 1 year ago. Episode frequency: No pain or dysuria currently. Treatments tried: Took 10 day dose of bactrim DS.     Review of Systems    Objective:    U/A is negative for leukocytes, nitrites and blood  Physical Exam   Constitutional: She is oriented to person, place, and time. She appears well-developed and well-nourished. No distress.   HENT:   Head: Normocephalic and atraumatic.   Eyes: Conjunctivae are normal. Right eye exhibits no discharge. Left eye exhibits no discharge. No scleral icterus.   Cardiovascular: Normal rate, regular rhythm and normal heart sounds.  Exam reveals no gallop and no friction rub.    No murmur heard.  Pulmonary/Chest: Effort normal and breath sounds normal. No respiratory distress. She has no wheezes. She has no rales.   Neurological: She is alert and oriented to person, place, and time.   Skin: Skin is warm and dry. She is not diaphoretic.   Psychiatric: She has a normal mood and affect. Her behavior is normal.   Nursing note and vitals reviewed.      Assessment:     This provider spent more than 25 minutes face to face with patient, more than half the time for counseling and coordination of care as noted.    1. Chronic UTI        Plan:     Chronic UTI  -     POCT urine dipstick without microscope        Discussed working up urological problems, She does not want to have any type of surgery, we discussed several things that could cause chronic UTI that may not require surgery. But we will not know unless she follows up with urologist and has testing done. Explained chronic UTI and antibiotic resistance as well. She does not want to see Dr. Helton. She does not want to go to [place] to  see Dr. Nguyen. She is agreeable to seeing Dr. Perez. Lets restart the hyprex and orange juice. Continue using the estrace cream.

## 2017-05-08 NOTE — MR AVS SNAPSHOT
[facility]  [address] [address] [address]  Phone: [phone]  Fax: [phone]                  Ama Lang   2017 3:20 PM   Office Visit    Description:  Female : 1928   Provider:  KIMBERLY Pulido   Department:  [facility]           Reason for Visit     Follow-up           Diagnoses this Visit        Comments    Chronic UTI    -  Primary            To Do List           Future Appointments        Provider Department Dept Phone    5/15/2017 3:45 PM Balbir Perez MD [facility] - Urology [phone]      Goals (5 Years of Data)     None      Follow-Up and Disposition     Return if symptoms worsen or fail to improve.    Follow-up and Disposition History      [facility]     [facility] On Call Nurse Care Line -  Assistance  Unless otherwise directed by your provider, please contact [facility], our nurse care line that is available for  assistance.     Registered nurses in the [facility] On Call Center provide: appointment scheduling, clinical advisement, health education, and other advisory services.  Call: [phone] (toll free)               Medications           Message regarding Medications     Verify the changes and/or additions to your medication regime listed below are the same as discussed with your clinician today.  If any of these changes or additions are incorrect, please notify your healthcare provider.        STOP taking these medications     sulfamethoxazole-trimethoprim 800-160mg (BACTRIM DS) 800-160 mg Tab            Verify that the below list of medications is an accurate representation of the medications you are currently taking.  If none reported, the list may be blank. If incorrect, please contact your healthcare provider. Carry this list with you in case of emergency.           Current Medications     ACETAMINOPHEN (TYLENOL 8 HOUR ORAL) Take 2 capsules by mouth daily as needed.    apixaban  "(ELIQUIS) 2.5 mg Tab Take 2.5 mg by mouth 2 (two) times daily.     atenolol (TENORMIN) 50 MG tablet Take 1 tablet (50 mg total) by mouth 2 (two) times daily.    CALCIUM ORAL Take 1 tablet by mouth once daily.    conjugated estrogens (PREMARIN) vaginal cream Use a pea size amount every night for a week, then cut back to twice a week.    fluticasone (FLONASE) 50 mcg/actuation nasal spray 2 sprays by Each Nare route once daily.    ketoconazole (NIZORAL) 2 % shampoo APPLY TO AFFECTED AREA ON THE SKIN 3 TIMES A DAY    levothyroxine (SYNTHROID) 50 MCG tablet TAKE 1 TABLET BY MOUTH DAILY    lisinopril 10 MG tablet Take 10 mg by mouth once daily.    methenamine (HIPREX) 1 gram Tab Take 1 tablet (1 g total) by mouth 2 (two) times daily. Take with 500 mg. Of vitamin C and 8 ounces of fluid    multivitamin (ONE DAILY MULTIVITAMIN) per tablet Take 1 tablet by mouth once daily.    PATADAY 0.2 % Drop INSTILL 1 DROP IN EACH EYE ONCE DAILY    simvastatin (ZOCOR) 20 MG tablet TAKE 1 TABLET (20 MG TOTAL) BY MOUTH EVERY EVENING.    triamcinolone acetonide 0.025% (KENALOG) 0.025 % cream 1 application 2 (two) times daily as needed. Apply to affected area    VIT C/VIT E/LUTEIN/MIN/OMEGA-3 (OCUVITE ORAL) Take 1 tablet by mouth once daily.           Clinical Reference Information           Your Vitals Were     BP Pulse Temp Resp Height Weight    150/86 (BP Location: Left arm, Patient Position: Sitting, BP Method: Manual) 69 97.8 °F (36.6 °C) (Oral) 16 5' 4" (1.626 m) 64.8 kg (142 lb 13.7 oz)    SpO2 BMI             96% 24.52 kg/m2         Blood Pressure          Most Recent Value    BP  (!)  150/86      Allergies as of 5/8/2017     No Known Drug Allergies      Immunizations Administered on Date of Encounter - 5/8/2017     None      Orders Placed During Today's Visit      Normal Orders This Visit    Ambulatory referral to Urology     POCT urine dipstick without microscope       Instructions    Discussed working up urological problems, She " does not want to have any type of surgery, we discussed several things that could cause chronic UTI that may not require surgery. But we will not know unless she follows up with urologist and has testing done. She does not want to see Dr. Helton. She does not want to go to [place] to see Dr. Nguyen. She is agreeable to seeing Dr. Perez. Lets restart the hyprex and orange juice. Continue using the estrace cream.        Language Assistance Services     ATTENTION: Language assistance services are available, free of charge. Please call [phone].      ATENCIÓN: Si habla español, tiene a marmolejo disposición servicios gratuitos de asistencia lingüística. Llame al [phone].     JENNIFER Ý: N?u b?n nói Ti?ng Vi?t, có các d?ch v? h? tr? ngôn ng? mi?n phí dành cho b?n. G?i s? [phone].         [facility] complies with applicable Federal civil rights laws and does not discriminate on the basis of race, color, national origin, age, disability, or sex.

## 2017-05-08 NOTE — PATIENT INSTRUCTIONS
Discussed working up urological problems, She does not want to have any type of surgery, we discussed several things that could cause chronic UTI that may not require surgery. But we will not know unless she follows up with urologist and has testing done. She does not want to see Dr. Helton. She does not want to go to [place] to see Dr. Nguyen. She is agreeable to seeing Dr. Perez. Lets restart the hyprex and orange juice. Continue using the estrace cream.

## 2017-05-10 LAB
BILIRUB SERPL-MCNC: NEGATIVE MG/DL
BLOOD URINE, POC: NEGATIVE
COLOR, POC UA: NORMAL
GLUCOSE UR QL STRIP: NORMAL
KETONES UR QL STRIP: NEGATIVE
LEUKOCYTE ESTERASE URINE, POC: NEGATIVE
NITRITE, POC UA: NEGATIVE
PH, POC UA: 5
PROTEIN, POC: NEGATIVE
SPECIFIC GRAVITY, POC UA: 1.01
UROBILINOGEN, POC UA: NORMAL

## 2017-06-30 ENCOUNTER — OFFICE VISIT (OUTPATIENT)
Dept: FAMILY MEDICINE | Facility: CLINIC | Age: 82
End: 2017-06-30
Payer: MEDICARE

## 2017-06-30 ENCOUNTER — DOCUMENTATION ONLY (OUTPATIENT)
Dept: FAMILY MEDICINE | Facility: CLINIC | Age: 82
End: 2017-06-30

## 2017-06-30 VITALS
BODY MASS INDEX: 24.92 KG/M2 | WEIGHT: 145.94 LBS | DIASTOLIC BLOOD PRESSURE: 94 MMHG | SYSTOLIC BLOOD PRESSURE: 158 MMHG | HEIGHT: 64 IN | OXYGEN SATURATION: 95 % | HEART RATE: 85 BPM | RESPIRATION RATE: 16 BRPM | TEMPERATURE: 98 F

## 2017-06-30 DIAGNOSIS — J01.00 ACUTE MAXILLARY SINUSITIS, RECURRENCE NOT SPECIFIED: Primary | ICD-10-CM

## 2017-06-30 DIAGNOSIS — M79.10 MYALGIA: ICD-10-CM

## 2017-06-30 PROCEDURE — 96372 THER/PROPH/DIAG INJ SC/IM: CPT | Mod: S$GLB,,, | Performed by: NURSE PRACTITIONER

## 2017-06-30 PROCEDURE — 1159F MED LIST DOCD IN RCRD: CPT | Mod: S$GLB,,, | Performed by: NURSE PRACTITIONER

## 2017-06-30 PROCEDURE — 99213 OFFICE O/P EST LOW 20 MIN: CPT | Mod: 25,S$GLB,, | Performed by: NURSE PRACTITIONER

## 2017-06-30 PROCEDURE — 1125F AMNT PAIN NOTED PAIN PRSNT: CPT | Mod: S$GLB,,, | Performed by: NURSE PRACTITIONER

## 2017-06-30 RX ORDER — AMOXICILLIN AND CLAVULANATE POTASSIUM 875; 125 MG/1; MG/1
1 TABLET, FILM COATED ORAL 2 TIMES DAILY
Qty: 10 TABLET | Refills: 0 | Status: SHIPPED | OUTPATIENT
Start: 2017-06-30 | End: 2017-07-05

## 2017-06-30 RX ORDER — DEXAMETHASONE SODIUM PHOSPHATE 4 MG/ML
4 INJECTION, SOLUTION INTRA-ARTICULAR; INTRALESIONAL; INTRAMUSCULAR; INTRAVENOUS; SOFT TISSUE
Status: COMPLETED | OUTPATIENT
Start: 2017-06-30 | End: 2017-06-30

## 2017-06-30 RX ADMIN — DEXAMETHASONE SODIUM PHOSPHATE 4 MG: 4 INJECTION, SOLUTION INTRA-ARTICULAR; INTRALESIONAL; INTRAMUSCULAR; INTRAVENOUS; SOFT TISSUE at 11:06

## 2017-06-30 NOTE — PROGRESS NOTES
Subjective:       Patient ID: Ama Lang is a 89 y.o. female.    Chief Complaint: Headache    Headache    This is a new problem. The current episode started 1 to 4 weeks ago (started about 10 days ago). The problem occurs daily. The problem has been gradually worsening. The pain is located in the occipital and frontal region. The pain radiates to the upper back. Associated symptoms include rhinorrhea and sinus pressure. Pertinent negatives include no fever. Treatments tried: flonase and vicks. The treatment provided no relief.     Review of Systems   Constitutional: Negative for fever.   HENT: Positive for rhinorrhea and sinus pressure.    Neurological: Positive for headaches.       Objective:      Physical Exam   Constitutional: She appears well-developed and well-nourished. No distress.   HENT:   Head: Normocephalic and atraumatic.   Right Ear: External ear normal.   Left Ear: External ear normal.   Nose: Nose normal.   Mouth/Throat: Oropharynx is clear and moist. No oropharyngeal exudate.   Eyes and cheeks are puffy, tender over maxillary sinuses.    Eyes: Conjunctivae are normal. Right eye exhibits no discharge. Left eye exhibits no discharge. No scleral icterus.   Neck: Normal range of motion. Neck supple.   Cardiovascular: Normal rate, regular rhythm and normal heart sounds.  Exam reveals no gallop and no friction rub.    No murmur heard.  Pulmonary/Chest: Effort normal and breath sounds normal. No respiratory distress. She has no wheezes. She has no rales.   Lymphadenopathy:     She has no cervical adenopathy.   Skin: Skin is warm and dry. She is not diaphoretic.   Psychiatric: She has a normal mood and affect. Her behavior is normal.       Assessment:       1. Acute maxillary sinusitis, recurrence not specified    2. Myalgia        Plan:       Acute maxillary sinusitis, recurrence not specified  -     amoxicillin-clavulanate 875-125mg (AUGMENTIN) 875-125 mg per tablet; Take 1 tablet by mouth 2 (two)  times daily.  Dispense: 10 tablet; Refill: 0    Myalgia  -     dexamethasone injection 4 mg; Inject 1 mL (4 mg total) into the muscle one time.         Do not take hyprex while on the augmentin, restart hyprex the day after you finish the augmentin.

## 2017-06-30 NOTE — PROGRESS NOTES
Health Maintenance Due   Topic Date Due    TETANUS VACCINE  02/11/1946    Zoster Vaccine  02/11/1988

## 2017-06-30 NOTE — PROGRESS NOTES
ID patient by name and date of birth.  Allergies confirmed.  Dexamethasone 4mg IM given as per orders , using aseptic technique.  Patient tolerated well.  Information sheet reviewed and given to patient.

## 2017-06-30 NOTE — PATIENT INSTRUCTIONS
Do not take hyprex while on the augmentin, restart hyprex the day after you finish the augmentin.

## 2017-07-26 ENCOUNTER — OFFICE VISIT (OUTPATIENT)
Dept: FAMILY MEDICINE | Facility: CLINIC | Age: 82
End: 2017-07-26
Payer: MEDICARE

## 2017-07-26 ENCOUNTER — DOCUMENTATION ONLY (OUTPATIENT)
Dept: FAMILY MEDICINE | Facility: CLINIC | Age: 82
End: 2017-07-26

## 2017-07-26 VITALS
SYSTOLIC BLOOD PRESSURE: 138 MMHG | RESPIRATION RATE: 16 BRPM | HEIGHT: 64 IN | DIASTOLIC BLOOD PRESSURE: 79 MMHG | OXYGEN SATURATION: 94 % | HEART RATE: 73 BPM | BODY MASS INDEX: 24.84 KG/M2 | TEMPERATURE: 98 F | WEIGHT: 145.5 LBS

## 2017-07-26 DIAGNOSIS — J01.11 ACUTE RECURRENT FRONTAL SINUSITIS: Primary | ICD-10-CM

## 2017-07-26 PROCEDURE — 1125F AMNT PAIN NOTED PAIN PRSNT: CPT | Mod: S$GLB,,, | Performed by: NURSE PRACTITIONER

## 2017-07-26 PROCEDURE — 99213 OFFICE O/P EST LOW 20 MIN: CPT | Mod: S$GLB,,, | Performed by: NURSE PRACTITIONER

## 2017-07-26 PROCEDURE — 1159F MED LIST DOCD IN RCRD: CPT | Mod: S$GLB,,, | Performed by: NURSE PRACTITIONER

## 2017-07-26 RX ORDER — AMOXICILLIN AND CLAVULANATE POTASSIUM 875; 125 MG/1; MG/1
TABLET, FILM COATED ORAL
Refills: 0 | COMMUNITY
Start: 2017-06-30 | End: 2017-07-26 | Stop reason: SDUPTHER

## 2017-07-26 RX ORDER — AMOXICILLIN AND CLAVULANATE POTASSIUM 875; 125 MG/1; MG/1
1 TABLET, FILM COATED ORAL EVERY 12 HOURS
Qty: 20 TABLET | Refills: 0 | Status: SHIPPED | OUTPATIENT
Start: 2017-07-26 | End: 2017-10-05 | Stop reason: ALTCHOICE

## 2017-07-26 NOTE — PROGRESS NOTES
Subjective:       Patient ID: Ama Lang is a 89 y.o. female.    Chief Complaint: Sinus Problem    Sinus Problem   This is a new problem. The current episode started 1 to 4 weeks ago (about 10-14 days ago). The problem has been gradually worsening since onset. There has been no fever. Associated symptoms include headaches and sinus pressure. Pertinent negatives include no chills or sore throat. (Fatigue, denies light flashes, weakness. ) Treatments tried: flonase, vicks. The treatment provided mild relief.     Review of Systems   Constitutional: Negative for chills.   HENT: Positive for sinus pressure. Negative for sore throat.    Neurological: Positive for headaches.       Objective:      Physical Exam   Constitutional: She appears well-developed and well-nourished. No distress.   HENT:   Head: Normocephalic and atraumatic.   Right Ear: External ear normal.   Left Ear: External ear normal.   Mouth/Throat: Oropharynx is clear and moist. No oropharyngeal exudate.   Nares boggy and purplish   Eyes: Conjunctivae are normal. Right eye exhibits no discharge. Left eye exhibits no discharge. No scleral icterus.   Neck: Normal range of motion. Neck supple.   Cardiovascular: Normal rate and normal heart sounds.  Exam reveals no gallop and no friction rub.    No murmur heard.  Pulmonary/Chest: Effort normal and breath sounds normal. No respiratory distress. She has no wheezes. She has no rales.   Lymphadenopathy:     She has no cervical adenopathy.   Skin: Skin is warm and dry. She is not diaphoretic.   Psychiatric: She has a normal mood and affect. Her behavior is normal.       Assessment:       1. Acute recurrent frontal sinusitis        Plan:       Acute recurrent frontal sinusitis  -     amoxicillin-clavulanate 875-125mg (AUGMENTIN) 875-125 mg per tablet; Take 1 tablet by mouth every 12 (twelve) hours.  Dispense: 20 tablet; Refill: 0

## 2017-08-24 ENCOUNTER — HOSPITAL ENCOUNTER (OUTPATIENT)
Dept: RADIOLOGY | Facility: HOSPITAL | Age: 82
Discharge: HOME | End: 2017-08-24
Attending: OTOLARYNGOLOGY
Payer: MEDICARE

## 2017-08-24 ENCOUNTER — NURSE TRIAGE (OUTPATIENT)
Dept: ADMINISTRATIVE | Facility: CLINIC | Age: 82
End: 2017-08-24

## 2017-08-24 DIAGNOSIS — J32.4 CHRONIC PANSINUSITIS: Primary | ICD-10-CM

## 2017-08-24 DIAGNOSIS — J32.4 CHRONIC PANSINUSITIS: ICD-10-CM

## 2017-08-24 PROCEDURE — 70220 X-RAY EXAM OF SINUSES: CPT | Mod: 26,,, | Performed by: RADIOLOGY

## 2017-08-24 PROCEDURE — 70220 X-RAY EXAM OF SINUSES: CPT | Mod: TC

## 2017-08-24 NOTE — TELEPHONE ENCOUNTER
"  Reason for Disposition   Question about upcoming scheduled test, no triage required and triager able to answer question    Answer Assessment - Initial Assessment Questions  1. REASON FOR CALL or QUESTION: "What is your reason for calling today?" or "How can I best help you?" or "What question do you have that I can help answer?"      Patient is trying to find out if an xray order was sent to [facility] in [place]. Xray was ordered by non [facility].    Protocols used: ST INFORMATION ONLY CALL-A-AH    "

## 2017-10-05 ENCOUNTER — OFFICE VISIT (OUTPATIENT)
Dept: FAMILY MEDICINE | Facility: CLINIC | Age: 82
End: 2017-10-05
Payer: MEDICARE

## 2017-10-05 ENCOUNTER — DOCUMENTATION ONLY (OUTPATIENT)
Dept: FAMILY MEDICINE | Facility: CLINIC | Age: 82
End: 2017-10-05

## 2017-10-05 VITALS
DIASTOLIC BLOOD PRESSURE: 90 MMHG | HEART RATE: 77 BPM | OXYGEN SATURATION: 97 % | TEMPERATURE: 98 F | BODY MASS INDEX: 24.1 KG/M2 | SYSTOLIC BLOOD PRESSURE: 142 MMHG | WEIGHT: 141.13 LBS | HEIGHT: 64 IN

## 2017-10-05 DIAGNOSIS — I10 ESSENTIAL HYPERTENSION: ICD-10-CM

## 2017-10-05 DIAGNOSIS — N39.0 URINARY TRACT INFECTION WITH HEMATURIA, SITE UNSPECIFIED: ICD-10-CM

## 2017-10-05 DIAGNOSIS — Z23 FLU VACCINE NEED: ICD-10-CM

## 2017-10-05 DIAGNOSIS — J30.2 CHRONIC SEASONAL ALLERGIC RHINITIS, UNSPECIFIED TRIGGER: Primary | ICD-10-CM

## 2017-10-05 DIAGNOSIS — E78.49 OTHER HYPERLIPIDEMIA: ICD-10-CM

## 2017-10-05 DIAGNOSIS — R31.9 URINARY TRACT INFECTION WITH HEMATURIA, SITE UNSPECIFIED: ICD-10-CM

## 2017-10-05 LAB
BILIRUB SERPL-MCNC: ABNORMAL MG/DL
BLOOD URINE, POC: 250
COLOR, POC UA: YELLOW
GLUCOSE UR QL STRIP: ABNORMAL
KETONES UR QL STRIP: ABNORMAL
LEUKOCYTE ESTERASE URINE, POC: ABNORMAL
NITRITE, POC UA: ABNORMAL
PH, POC UA: 6
PROTEIN, POC: ABNORMAL
SPECIFIC GRAVITY, POC UA: 1.01
UROBILINOGEN, POC UA: ABNORMAL

## 2017-10-05 PROCEDURE — 99213 OFFICE O/P EST LOW 20 MIN: CPT | Mod: 25,S$GLB,, | Performed by: NURSE PRACTITIONER

## 2017-10-05 PROCEDURE — 90662 IIV NO PRSV INCREASED AG IM: CPT | Mod: S$GLB,,, | Performed by: NURSE PRACTITIONER

## 2017-10-05 PROCEDURE — 87088 URINE BACTERIA CULTURE: CPT

## 2017-10-05 PROCEDURE — G0008 ADMIN INFLUENZA VIRUS VAC: HCPCS | Mod: S$GLB,,, | Performed by: NURSE PRACTITIONER

## 2017-10-05 PROCEDURE — 81002 URINALYSIS NONAUTO W/O SCOPE: CPT | Mod: S$GLB,,, | Performed by: NURSE PRACTITIONER

## 2017-10-05 PROCEDURE — 87186 SC STD MICRODIL/AGAR DIL: CPT

## 2017-10-05 PROCEDURE — 87086 URINE CULTURE/COLONY COUNT: CPT

## 2017-10-05 PROCEDURE — 87077 CULTURE AEROBIC IDENTIFY: CPT

## 2017-10-05 RX ORDER — MONTELUKAST SODIUM 10 MG/1
10 TABLET ORAL NIGHTLY
Qty: 30 TABLET | Refills: 11 | Status: SHIPPED | OUTPATIENT
Start: 2017-10-05 | End: 2017-11-04

## 2017-10-05 RX ORDER — SULFAMETHOXAZOLE AND TRIMETHOPRIM 800; 160 MG/1; MG/1
1 TABLET ORAL 2 TIMES DAILY
Qty: 10 TABLET | Refills: 0 | Status: SHIPPED | OUTPATIENT
Start: 2017-10-05 | End: 2017-10-10

## 2017-10-05 RX ORDER — AZELASTINE 1 MG/ML
1 SPRAY, METERED NASAL 2 TIMES DAILY
Qty: 30 ML | Refills: 11 | Status: SHIPPED | OUTPATIENT
Start: 2017-10-05 | End: 2018-10-11 | Stop reason: SDUPTHER

## 2017-10-05 NOTE — PROGRESS NOTES
"Subjective:       Patient ID: Ama Lang is a 89 y.o. female.    Chief Complaint: Urinary Tract Infection  Pt has a history of recurrent urinary tract infections for about 20 years. She was referred to Dr. Calhoun, but cancelled the recommended cystoscope. She stated she didn't feel like surgery was best at her age.   Urinary Tract Infection    This is a recurrent problem. The current episode started in the past 7 days. The problem occurs every urination. The problem has been gradually worsening. The quality of the pain is described as aching. The pain is at a severity of 5/10. The pain is moderate. There has been no fever. There is no history of pyelonephritis. Associated symptoms include behavior changes, flank pain, frequency, nausea and urgency. Pertinent negatives include no chills, discharge, hematuria, hesitancy, possible pregnancy, sweats, vomiting, weight loss, bubble bath use, constipation, rash or withholding. She has tried NSAIDs for the symptoms. Her past medical history is significant for hypertension and recurrent UTIs. There is no history of diabetes mellitus, kidney stones or a urological procedure.     Review of Systems   Constitutional: Positive for appetite change. Negative for chills, diaphoresis, fever, unexpected weight change and weight loss.        "not hungry"   HENT: Positive for postnasal drip, rhinorrhea and sinus pressure. Negative for mouth sores, nosebleeds, sneezing and sore throat.    Eyes: Positive for discharge, itching and visual disturbance.        Blurry vision   Respiratory: Negative for choking, wheezing and stridor.         "shallow breathing at times"   Cardiovascular: Positive for palpitations. Negative for chest pain.   Gastrointestinal: Positive for diarrhea and nausea. Negative for constipation and vomiting.        2 episodes of diarrhea 1 week ago, none since   Genitourinary: Positive for flank pain, frequency and urgency. Negative for hematuria and " hesitancy.   Musculoskeletal: Positive for back pain. Negative for arthralgias.   Skin: Negative for rash.   Allergic/Immunologic: Positive for environmental allergies.   Neurological: Positive for weakness and headaches. Negative for dizziness.       Objective:     Urine is positive for nitrites, leukocytes 2+, Protein 1+, and blood 250  Physical Exam   Constitutional: She is oriented to person, place, and time. She appears well-developed and well-nourished. No distress.   HENT:   Head: Normocephalic and atraumatic.   Dejesus nares   Eyes: Conjunctivae are normal. Pupils are equal, round, and reactive to light. Right eye exhibits no discharge. Left eye exhibits no discharge. No scleral icterus.   Neck: Normal range of motion.   Cardiovascular: Normal rate and normal heart sounds.  Exam reveals no gallop and no friction rub.    No murmur heard.  Irregular rate and rhythm   Pulmonary/Chest: Effort normal and breath sounds normal. No respiratory distress. She has no wheezes. She has no rales.   Abdominal: Soft. Bowel sounds are normal.   Musculoskeletal:   Denies pain on back when kidneys are percussed   Neurological: She is alert and oriented to person, place, and time.   Skin: Skin is warm and dry. Capillary refill takes less than 2 seconds. She is not diaphoretic.   Psychiatric: She has a normal mood and affect. Her behavior is normal.   Nursing note and vitals reviewed.         Narrative     Comparison: 7/1/13    Technique: Frontal, lateral, Abel and SMV views of the paranasal sinuses.    Findings: The paranasal sinuses appear symmetrically aerated. The left maxillary and right frontal sinuses are slightly hypoplastic. No sinus air-fluid level was demonstrated radiographically. Somewhat increased density within the mastoids is noted bilaterally which may be developmental or related to a small volume of mastoid fluid such as that seen with eustachian tube dysfunction. Dental hardware noted. No acute osseous  abnormality is seen.   Impression       1.  No radiographic evidence for active paranasal sinus disease.  2. Slightly increased mastoid density bilaterally, similar to previous which may be developmental or related to mastoid effusions.      Electronically signed by: Shyla Wu MD  Date: 08/24/17  Time: 15:03           Assessment:                 1. Chronic seasonal allergic rhinitis, unspecified trigger    2. Flu vaccine need    3. Urinary tract infection with hematuria, site unspecified    4. Essential hypertension    5. Other hyperlipidemia        Plan:       Chronic seasonal allergic rhinitis, unspecified trigger  -     montelukast (SINGULAIR) 10 mg tablet; Take 1 tablet (10 mg total) by mouth every evening.  Dispense: 30 tablet; Refill: 11  -     azelastine (ASTELIN) 137 mcg (0.1 %) nasal spray; 1 spray (137 mcg total) by Nasal route 2 (two) times daily.  Dispense: 30 mL; Refill: 11    Flu vaccine need  -     Influenza - High Dose (65+) (PF) (IM)    Urinary tract infection with hematuria, site unspecified  -     sulfamethoxazole-trimethoprim 800-160mg (BACTRIM DS) 800-160 mg Tab; Take 1 tablet by mouth 2 (two) times daily.  Dispense: 10 tablet; Refill: 0  -     POCT urine dipstick without microscope  -     Urine culture    Essential hypertension  -     CBC auto differential; Future; Expected date: 10/05/2017  -     Comprehensive metabolic panel; Future; Expected date: 10/05/2017  -     TSH; Future; Expected date: 10/05/2017    Other hyperlipidemia  -     Lipid panel; Future; Expected date: 10/05/2017      Stop the hiprex until you finish the bactrim. Then restart hiprex the next day.   Refuses to see allergist, refuses to see urology.      Blood pressure is mildly elevated, not high enough to push because she is a frail 89 year old.

## 2017-10-05 NOTE — PROGRESS NOTES
Health Maintenance Due   Topic Date Due    TETANUS VACCINE  02/11/1946    Zoster Vaccine  02/11/1988    Influenza Vaccine  08/01/2017

## 2017-10-09 LAB — BACTERIA UR CULT: NORMAL

## 2017-11-06 DIAGNOSIS — E03.9 ACQUIRED HYPOTHYROIDISM: ICD-10-CM

## 2017-11-06 RX ORDER — LEVOTHYROXINE SODIUM 50 UG/1
TABLET ORAL
Qty: 90 TABLET | Refills: 0 | Status: SHIPPED | OUTPATIENT
Start: 2017-11-06 | End: 2017-12-19 | Stop reason: SDUPTHER

## 2017-11-13 ENCOUNTER — OFFICE VISIT (OUTPATIENT)
Dept: FAMILY MEDICINE | Facility: CLINIC | Age: 82
End: 2017-11-13
Payer: MEDICARE

## 2017-11-13 ENCOUNTER — DOCUMENTATION ONLY (OUTPATIENT)
Dept: FAMILY MEDICINE | Facility: CLINIC | Age: 82
End: 2017-11-13

## 2017-11-13 VITALS
HEART RATE: 76 BPM | SYSTOLIC BLOOD PRESSURE: 170 MMHG | DIASTOLIC BLOOD PRESSURE: 100 MMHG | BODY MASS INDEX: 24.32 KG/M2 | TEMPERATURE: 98 F | OXYGEN SATURATION: 96 % | HEIGHT: 64 IN | WEIGHT: 142.44 LBS

## 2017-11-13 DIAGNOSIS — N30.01 ACUTE CYSTITIS WITH HEMATURIA: Primary | ICD-10-CM

## 2017-11-13 DIAGNOSIS — I10 ESSENTIAL HYPERTENSION: ICD-10-CM

## 2017-11-13 DIAGNOSIS — N39.0 CHRONIC UTI: ICD-10-CM

## 2017-11-13 DIAGNOSIS — K64.9 HEMORRHOIDS, UNSPECIFIED HEMORRHOID TYPE: ICD-10-CM

## 2017-11-13 LAB
BILIRUB SERPL-MCNC: ABNORMAL MG/DL
BLOOD URINE, POC: 50
COLOR, POC UA: YELLOW
GLUCOSE UR QL STRIP: ABNORMAL
KETONES UR QL STRIP: ABNORMAL
LEUKOCYTE ESTERASE URINE, POC: ABNORMAL
NITRITE, POC UA: ABNORMAL
PH, POC UA: 6
PROTEIN, POC: ABNORMAL
SPECIFIC GRAVITY, POC UA: 1.01
UROBILINOGEN, POC UA: ABNORMAL

## 2017-11-13 PROCEDURE — 87086 URINE CULTURE/COLONY COUNT: CPT

## 2017-11-13 PROCEDURE — 87077 CULTURE AEROBIC IDENTIFY: CPT

## 2017-11-13 PROCEDURE — 93010 ELECTROCARDIOGRAM REPORT: CPT | Mod: ,,, | Performed by: INTERNAL MEDICINE

## 2017-11-13 PROCEDURE — 87088 URINE BACTERIA CULTURE: CPT

## 2017-11-13 PROCEDURE — 87186 SC STD MICRODIL/AGAR DIL: CPT

## 2017-11-13 PROCEDURE — 93005 ELECTROCARDIOGRAM TRACING: CPT | Mod: S$GLB,,, | Performed by: NURSE PRACTITIONER

## 2017-11-13 PROCEDURE — 99214 OFFICE O/P EST MOD 30 MIN: CPT | Mod: 25,S$GLB,, | Performed by: NURSE PRACTITIONER

## 2017-11-13 PROCEDURE — 81002 URINALYSIS NONAUTO W/O SCOPE: CPT | Mod: S$GLB,,, | Performed by: NURSE PRACTITIONER

## 2017-11-13 RX ORDER — LISINOPRIL AND HYDROCHLOROTHIAZIDE 10; 12.5 MG/1; MG/1
TABLET ORAL
Qty: 30 TABLET | Refills: 5 | Status: SHIPPED | OUTPATIENT
Start: 2017-11-13 | End: 2017-12-19 | Stop reason: ALTCHOICE

## 2017-11-13 RX ORDER — HYDROCORTISONE 25 MG/G
CREAM TOPICAL 2 TIMES DAILY
Qty: 28 G | Refills: 1 | Status: SHIPPED | OUTPATIENT
Start: 2017-11-13 | End: 2017-11-23

## 2017-11-13 RX ORDER — NITROFURANTOIN 25; 75 MG/1; MG/1
100 CAPSULE ORAL 2 TIMES DAILY
Qty: 10 CAPSULE | Refills: 0 | Status: SHIPPED | OUTPATIENT
Start: 2017-11-13 | End: 2017-12-19 | Stop reason: ALTCHOICE

## 2017-11-13 NOTE — PATIENT INSTRUCTIONS
Stop the hiprex while on the macrobid.   Continue the atenolol and the lisinopril. Add the second lisinopril and hctz to this regime. If you have any chest pain, shortness of breath, confusion, headache, go to the ER.

## 2017-11-13 NOTE — PROGRESS NOTES
"Subjective:       Patient ID: Ama Lang is a 89 y.o. female.    Chief Complaint: Urinary Tract Infection    Urinary Tract Infection    This is a recurrent problem. The current episode started in the past 7 days (Started Friday night with frequency). The problem occurs every urination. The quality of the pain is described as burning. Associated symptoms include frequency and urgency. She has tried antibiotics (Has been on hiprex) for the symptoms.     Has hemorrhoids and has had diarrhea in the last week. Hemorrhoids are worse now. Has been using butt paste on them without relief.     Blood pressure is elevated significantly, had back pain earlier today, denies chest pain, denies shortness of breath, has mild headache, with "fogginess".   Review of Systems   Genitourinary: Positive for frequency and urgency.       Objective:    EKG shows afib, T wave abnormality, incomplete right bundle branch block, compared to EKG done 4/17, no significant difference.  U/A positive for 2+ leukocytes, nitrites and blood 50  Physical Exam   Constitutional: She is oriented to person, place, and time. She appears well-developed and well-nourished. No distress.   HENT:   Head: Normocephalic and atraumatic.   Eyes: Conjunctivae are normal. Right eye exhibits no discharge. Left eye exhibits no discharge. No scleral icterus.   Cardiovascular: Normal rate, regular rhythm and normal heart sounds.  Exam reveals no gallop and no friction rub.    No murmur heard.  Pulmonary/Chest: Effort normal and breath sounds normal. No respiratory distress. She has no wheezes. She has no rales.   Musculoskeletal:   No spinal or CVA tenderness with percussion   Neurological: She is alert and oriented to person, place, and time. No cranial nerve deficit. She exhibits normal muscle tone. Coordination normal.   Skin: Skin is warm and dry. She is not diaphoretic.   Psychiatric: She has a normal mood and affect. Her behavior is normal.   Nursing note and " vitals reviewed.      Assessment:       1. Acute cystitis with hematuria    2. Hemorrhoids, unspecified hemorrhoid type    3. Chronic UTI        Plan:     Acute cystitis with hematuria  -     POCT urine dipstick without microscope  -     Urine culture  -     nitrofurantoin, macrocrystal-monohydrate, (MACROBID) 100 MG capsule; Take 1 capsule (100 mg total) by mouth 2 (two) times daily.  Dispense: 10 capsule; Refill: 0    Hemorrhoids, unspecified hemorrhoid type  -     hydrocortisone 2.5 % cream; Apply topically 2 (two) times daily.  Dispense: 28 g; Refill: 1    Chronic UTI  -     Ambulatory referral to Urology       Stop the hiprex while on the macrobid.   Continue the atenolol and the lisinopril. Add the second lisinopril and hctz to this regime. If you have any chest pain, shortness of breath, confusion, headache, go to the ER.

## 2017-11-13 NOTE — PROGRESS NOTES
Health Maintenance Due   Topic Date Due    TETANUS VACCINE  02/11/1946    Zoster Vaccine  02/11/1988    Lipid Panel  11/07/2017

## 2017-11-15 ENCOUNTER — TELEPHONE (OUTPATIENT)
Dept: FAMILY MEDICINE | Facility: CLINIC | Age: 82
End: 2017-11-15

## 2017-11-15 ENCOUNTER — CLINICAL SUPPORT (OUTPATIENT)
Dept: FAMILY MEDICINE | Facility: CLINIC | Age: 82
End: 2017-11-15
Payer: MEDICARE

## 2017-11-15 VITALS — HEART RATE: 74 BPM | DIASTOLIC BLOOD PRESSURE: 88 MMHG | SYSTOLIC BLOOD PRESSURE: 140 MMHG

## 2017-11-15 LAB — BACTERIA UR CULT: NORMAL

## 2017-11-15 NOTE — TELEPHONE ENCOUNTER
In for blood pressure check.  Last blood pressure at visit was 170/100 .  Last medication dose was last night.  Does not monitorat home .  Blood pressure today is 140/88 with pulse of 74.  Fwd to provider

## 2017-12-11 ENCOUNTER — DOCUMENTATION ONLY (OUTPATIENT)
Dept: FAMILY MEDICINE | Facility: CLINIC | Age: 82
End: 2017-12-11

## 2017-12-11 NOTE — PROGRESS NOTES
Pre-Visit Chart Review  For Appointment Scheduled on 12-19-17    Health Maintenance Due   Topic Date Due    TETANUS VACCINE  02/11/1946    Zoster Vaccine  02/11/1988    Lipid Panel  11/07/2017

## 2017-12-19 ENCOUNTER — OFFICE VISIT (OUTPATIENT)
Dept: FAMILY MEDICINE | Facility: CLINIC | Age: 82
End: 2017-12-19
Attending: FAMILY MEDICINE
Payer: MEDICARE

## 2017-12-19 VITALS
HEART RATE: 80 BPM | BODY MASS INDEX: 23.9 KG/M2 | RESPIRATION RATE: 20 BRPM | DIASTOLIC BLOOD PRESSURE: 78 MMHG | TEMPERATURE: 98 F | WEIGHT: 140 LBS | HEIGHT: 64 IN | OXYGEN SATURATION: 93 % | SYSTOLIC BLOOD PRESSURE: 120 MMHG

## 2017-12-19 DIAGNOSIS — E87.1 HYPONATREMIA: Primary | ICD-10-CM

## 2017-12-19 DIAGNOSIS — E03.9 ACQUIRED HYPOTHYROIDISM: ICD-10-CM

## 2017-12-19 DIAGNOSIS — I48.91 ATRIAL FIBRILLATION, UNSPECIFIED TYPE: ICD-10-CM

## 2017-12-19 DIAGNOSIS — N39.0 URINARY TRACT INFECTION WITHOUT HEMATURIA, SITE UNSPECIFIED: ICD-10-CM

## 2017-12-19 DIAGNOSIS — A49.8 INFECTION DUE TO ENTEROBACTER AEROGENES: ICD-10-CM

## 2017-12-19 PROCEDURE — 99214 OFFICE O/P EST MOD 30 MIN: CPT | Mod: S$PBB,,, | Performed by: FAMILY MEDICINE

## 2017-12-19 PROCEDURE — 99214 OFFICE O/P EST MOD 30 MIN: CPT | Mod: PBBFAC,PO | Performed by: FAMILY MEDICINE

## 2017-12-19 PROCEDURE — 99999 PR PBB SHADOW E&M-EST. PATIENT-LVL IV: CPT | Mod: PBBFAC,,, | Performed by: FAMILY MEDICINE

## 2017-12-19 RX ORDER — HYDROCORTISONE 25 MG/G
CREAM TOPICAL 2 TIMES DAILY
COMMUNITY
End: 2020-11-12

## 2017-12-19 RX ORDER — LISINOPRIL 20 MG/1
20 TABLET ORAL DAILY
Refills: 1 | COMMUNITY
Start: 2017-12-07 | End: 2018-02-20

## 2017-12-19 RX ORDER — LEVOFLOXACIN 500 MG/1
TABLET, FILM COATED ORAL
Refills: 0 | COMMUNITY
Start: 2017-12-07 | End: 2017-12-19 | Stop reason: ALTCHOICE

## 2017-12-19 RX ORDER — LEVOTHYROXINE SODIUM 50 UG/1
50 TABLET ORAL DAILY
Qty: 90 TABLET | Refills: 3 | Status: SHIPPED | OUTPATIENT
Start: 2017-12-19 | End: 2018-11-27

## 2017-12-19 NOTE — PATIENT INSTRUCTIONS
Controlling High Blood Pressure  High blood pressure (hypertension) is often called the silent killer. This is because many people who have it dont know it. High blood pressure is defined as 140/90 mm Hg or higher. Know your blood pressure and remember to check it regularly. Doing so can save your life. Here are some things you can do to help control your blood pressure.    Choose heart-healthy foods  · Select low-salt, low-fat foods. Limit sodium intake to 2,400 mg per day or the amount suggested by your healthcare provider.  · Limit canned, dried, cured, packaged, and fast foods. These can contain a lot of salt.  · Eat 8 to 10 servings of fruits and vegetables every day.  · Choose lean meats, fish, or chicken.  · Eat whole-grain pasta, brown rice, and beans.  · Eat 2 to 3 servings of low-fat or fat-free dairy products.  · Ask your doctor about the DASH eating plan. This plan helps reduce blood pressure.  · When you go to a restaurant, ask that your meal be prepared with no added salt.  Maintain a healthy weight  · Ask your healthcare provider how many calories to eat a day. Then stick to that number.  · Ask your healthcare provider what weight range is healthiest for you. If you are overweight, a weight loss of only 3% to 5% of your body weight can help lower blood pressure. Generally, a good weight loss goal is to lose 10% of your body weight in a year.  · Limit snacks and sweets.  · Get regular exercise.  Get up and get active  · Choose activities you enjoy. Find ones you can do with friends or family. This includes bicycling, dancing, walking, and jogging.  · Park farther away from building entrances.  · Use stairs instead of the elevator.  · When you can, walk or bike instead of driving.  · [place] leaves, garden, or do household repairs.  · Be active at a moderate to vigorous level of physical activity for at least 40 minutes for a minimum of 3 to 4 days a week.   Manage stress  · Make time to relax and enjoy  life. Find time to laugh.  · Communicate your concerns with your loved ones and your healthcare provider.  · Visit with family and friends, and keep up with hobbies.  Limit alcohol and quit smoking  · Men should have no more than 2 drinks per day.  · Women should have no more than 1 drink per day.  · Talk with your healthcare provider about quitting smoking. Smoking significantly increases your risk for heart disease and stroke. Ask your healthcare provider about community smoking cessation programs and other options.  Medicines  If lifestyle changes arent enough, your healthcare provider may prescribe high blood pressure medicine. Take all medicines as prescribed. If you have any questions about your medicines, ask your healthcare provider before stopping or changing them.   Date Last Reviewed: 4/27/2016  © 9754-4750 [employer]. [address] [place] [address], [place] [address]. All rights reserved. This information is not intended as a substitute for professional medical care. Always follow your healthcare professional's instructions.

## 2017-12-19 NOTE — PROGRESS NOTES
Subjective:       Patient ID: Ama Lang is a 89 y.o. female.    Chief Complaint: Hospital Follow Up    89-year-old female coming in for hospital follow-up from a brief stay at [facility] December 5-7.  She went in because of weakness and lethargy with atypical chest pain.  She was found to have a urinary tract infection which eventually grew Enterobacter at Julio knees and was also found to have hyponatremia felt secondary to hydrochlorothiazide.  The diuretic was discontinued and her sodium was corrected with assistance of nephrology.  Lisinopril was increased to 20 mg to maintain good blood pressure control and eventually she was placed on Levaquin which was continued for 5 days post discharge.  Cardiac enzymes were negative and her symptoms resolved during the hospital stay.  She states she is feeling fairly well not quite up to her usual energy state but is improving steadily.  Her TSH was done during the hospital stay on December 5, 2017 and was 2.53.    Past Medical History:  No date: *Atrial fibrillation      Comment: Dr. Mullen  No date: Cataract  No date: Diverticulitis  No date: Hyperlipidemia  No date: Hypertension  No date: Hypothyroidism    Past Surgical History:  12/18/2003: COLONOSCOPY      Comment: Dr. Santoyo  2008: CYSTOSCOPY      Comment: Dr. Nguyen  No date: DILATION AND CURETTAGE OF UTERUS  No date: EYE SURGERY  No date: TUBAL LIGATION      Current Outpatient Prescriptions:     apixaban (ELIQUIS) 2.5 mg Tab, Take 2.5 mg by mouth 2 (two) times daily. , Disp: , Rfl:     atenolol (TENORMIN) 50 MG tablet, Take 1 tablet (50 mg total) by mouth 2 (two) times daily., Disp: 180 tablet, Rfl: 0    azelastine (ASTELIN) 137 mcg (0.1 %) nasal spray, 1 spray (137 mcg total) by Nasal route 2 (two) times daily., Disp: 30 mL, Rfl: 11    CALCIUM ORAL, Take 1 tablet by mouth once daily., Disp: , Rfl:     conjugated estrogens (PREMARIN) vaginal cream, Use a pea size amount every night for a week, then cut back  to twice a week., Disp: 30 g, Rfl: 11    fluticasone (FLONASE) 50 mcg/actuation nasal spray, 2 sprays by Each Nare route once daily., Disp: 16 g, Rfl: 11    hydrocortisone 2.5 % cream, Apply topically 2 (two) times daily., Disp: , Rfl:     ketoconazole (NIZORAL) 2 % shampoo, APPLY TO AFFECTED AREA ON THE SKIN 3 TIMES A week, Disp: , Rfl: 5    levothyroxine (SYNTHROID) 50 MCG tablet, Take 1 tablet (50 mcg total) by mouth once daily., Disp: 90 tablet, Rfl: 3    lisinopril (PRINIVIL,ZESTRIL) 20 MG tablet, Take 20 mg by mouth once daily. , Disp: , Rfl: 1    multivitamin (ONE DAILY MULTIVITAMIN) per tablet, Take 1 tablet by mouth once daily., Disp: , Rfl:     simvastatin (ZOCOR) 20 MG tablet, TAKE 1 TABLET (20 MG TOTAL) BY MOUTH EVERY EVENING., Disp: 30 tablet, Rfl: 10    triamcinolone acetonide 0.025% (KENALOG) 0.025 % cream, 1 application 2 (two) times daily as needed. Apply to affected area, Disp: , Rfl: 5    VIT C/VIT E/LUTEIN/MIN/OMEGA-3 (OCUVITE ORAL), Take 1 tablet by mouth once daily., Disp: , Rfl:     ACETAMINOPHEN (TYLENOL 8 HOUR ORAL), Take 2 capsules by mouth daily as needed., Disp: , Rfl:     TETANUS VACCINE due on 02/11/1946  Zoster Vaccine due on 02/11/1988  Lipid Panel due on 11/07/2017        Review of Systems   Constitutional: Negative for chills and fever.   Respiratory: Negative for chest tightness and shortness of breath.    Cardiovascular: Negative for chest pain and palpitations.   Gastrointestinal: Negative for abdominal pain, constipation, diarrhea, nausea and vomiting.   Genitourinary: Negative for dysuria, frequency, hematuria and urgency.   Neurological: Negative for dizziness, weakness, light-headedness, numbness and headaches.       Objective:      Physical Exam   Constitutional: She is oriented to person, place, and time. She appears well-developed and well-nourished. No distress.   Good blood pressure control  Normal weight with BMI 24.0 she is down 8.3 pounds from her last visit  with me December 19, 2016   HENT:   Head: Normocephalic and atraumatic.   Eyes: EOM are normal. Pupils are equal, round, and reactive to light. No scleral icterus.   Neck: Normal range of motion. Neck supple. No JVD present. No tracheal deviation present. No thyromegaly present.   Cardiovascular: Normal rate and normal heart sounds.  Exam reveals no gallop and no friction rub.    No murmur heard.  Irregular irregular rhythm with controlled rate consistent with well-controlled atrial fibrillation  Carotid pulses 2+ without bruit   Pulmonary/Chest: Effort normal and breath sounds normal. No respiratory distress. She has no wheezes. She has no rales. She exhibits no tenderness.   Abdominal: Soft. Bowel sounds are normal. She exhibits no distension and no mass. There is no tenderness. There is no rebound and no guarding. No hernia.   Lymphadenopathy:     She has no cervical adenopathy.   Neurological: She is alert and oriented to person, place, and time.   Skin: She is not diaphoretic.   Psychiatric: She has a normal mood and affect. Her behavior is normal. Thought content normal.   Nursing note and vitals reviewed.      Assessment:       1. Hyponatremia    2. Urinary tract infection without hematuria, site unspecified    3. Infection due to Enterobacter aerogenes    4. Acquired hypothyroidism    5. Atrial fibrillation, unspecified type        Plan:       1. Hyponatremia  Resolved post discontinuance of hydrochlorothiazide    2. Urinary tract infection without hematuria, site unspecified  Asymptomatic post treatment with Levaquin    3. Infection due to Enterobacter aerogenes    4. Acquired hypothyroidism  Euthyroid  - levothyroxine (SYNTHROID) 50 MCG tablet; Take 1 tablet (50 mcg total) by mouth once daily.  Dispense: 90 tablet; Refill: 3    5. Atrial fibrillation, unspecified type  Stable

## 2018-01-25 ENCOUNTER — CLINICAL SUPPORT (OUTPATIENT)
Dept: FAMILY MEDICINE | Facility: CLINIC | Age: 83
End: 2018-01-25
Payer: MEDICARE

## 2018-01-25 DIAGNOSIS — I10 ESSENTIAL HYPERTENSION: ICD-10-CM

## 2018-01-25 DIAGNOSIS — E78.49 OTHER HYPERLIPIDEMIA: ICD-10-CM

## 2018-01-25 LAB
ALBUMIN SERPL BCP-MCNC: 4 G/DL
ALP SERPL-CCNC: 89 U/L
ALT SERPL W/O P-5'-P-CCNC: 15 U/L
ANION GAP SERPL CALC-SCNC: 8 MMOL/L
AST SERPL-CCNC: 27 U/L
BASOPHILS # BLD AUTO: 0.07 K/UL
BASOPHILS NFR BLD: 1.9 %
BILIRUB SERPL-MCNC: 1.1 MG/DL
BUN SERPL-MCNC: 8 MG/DL
CALCIUM SERPL-MCNC: 10.8 MG/DL
CHLORIDE SERPL-SCNC: 104 MMOL/L
CHOLEST SERPL-MCNC: 148 MG/DL
CHOLEST/HDLC SERPL: 2.3 {RATIO}
CO2 SERPL-SCNC: 28 MMOL/L
CREAT SERPL-MCNC: 0.8 MG/DL
DIFFERENTIAL METHOD: ABNORMAL
EOSINOPHIL # BLD AUTO: 0.1 K/UL
EOSINOPHIL NFR BLD: 3 %
ERYTHROCYTE [DISTWIDTH] IN BLOOD BY AUTOMATED COUNT: 12.8 %
EST. GFR  (AFRICAN AMERICAN): >60 ML/MIN/1.73 M^2
EST. GFR  (NON AFRICAN AMERICAN): >60 ML/MIN/1.73 M^2
GLUCOSE SERPL-MCNC: 98 MG/DL
HCT VFR BLD AUTO: 36.9 %
HDLC SERPL-MCNC: 65 MG/DL
HDLC SERPL: 43.9 %
HGB BLD-MCNC: 12.1 G/DL
IMM GRANULOCYTES # BLD AUTO: 0.01 K/UL
IMM GRANULOCYTES NFR BLD AUTO: 0.3 %
LDLC SERPL CALC-MCNC: 70 MG/DL
LYMPHOCYTES # BLD AUTO: 1.4 K/UL
LYMPHOCYTES NFR BLD: 36.8 %
MCH RBC QN AUTO: 34.2 PG
MCHC RBC AUTO-ENTMCNC: 32.8 G/DL
MCV RBC AUTO: 104 FL
MONOCYTES # BLD AUTO: 0.4 K/UL
MONOCYTES NFR BLD: 11 %
NEUTROPHILS # BLD AUTO: 1.8 K/UL
NEUTROPHILS NFR BLD: 47 %
NONHDLC SERPL-MCNC: 83 MG/DL
NRBC BLD-RTO: 0 /100 WBC
PLATELET # BLD AUTO: 122 K/UL
PMV BLD AUTO: 14.6 FL
POTASSIUM SERPL-SCNC: 4.5 MMOL/L
PROT SERPL-MCNC: 7.3 G/DL
RBC # BLD AUTO: 3.54 M/UL
SODIUM SERPL-SCNC: 140 MMOL/L
TRIGL SERPL-MCNC: 65 MG/DL
TSH SERPL DL<=0.005 MIU/L-ACNC: 3.94 UIU/ML
WBC # BLD AUTO: 3.72 K/UL

## 2018-01-25 PROCEDURE — 80053 COMPREHEN METABOLIC PANEL: CPT

## 2018-01-25 PROCEDURE — 84443 ASSAY THYROID STIM HORMONE: CPT

## 2018-01-25 PROCEDURE — 80061 LIPID PANEL: CPT

## 2018-01-25 PROCEDURE — 85025 COMPLETE CBC W/AUTO DIFF WBC: CPT

## 2018-02-20 ENCOUNTER — OFFICE VISIT (OUTPATIENT)
Dept: FAMILY MEDICINE | Facility: CLINIC | Age: 83
End: 2018-02-20
Payer: MEDICARE

## 2018-02-20 ENCOUNTER — DOCUMENTATION ONLY (OUTPATIENT)
Dept: FAMILY MEDICINE | Facility: CLINIC | Age: 83
End: 2018-02-20

## 2018-02-20 VITALS
WEIGHT: 139.13 LBS | BODY MASS INDEX: 23.75 KG/M2 | SYSTOLIC BLOOD PRESSURE: 138 MMHG | DIASTOLIC BLOOD PRESSURE: 102 MMHG | HEART RATE: 67 BPM | TEMPERATURE: 98 F | OXYGEN SATURATION: 98 % | HEIGHT: 64 IN

## 2018-02-20 DIAGNOSIS — E83.52 HYPERCALCEMIA: ICD-10-CM

## 2018-02-20 DIAGNOSIS — N39.0 CHRONIC UTI: Primary | ICD-10-CM

## 2018-02-20 DIAGNOSIS — N30.01 ACUTE CYSTITIS WITH HEMATURIA: ICD-10-CM

## 2018-02-20 DIAGNOSIS — I10 ESSENTIAL HYPERTENSION: ICD-10-CM

## 2018-02-20 DIAGNOSIS — I48.20 CHRONIC ATRIAL FIBRILLATION: ICD-10-CM

## 2018-02-20 LAB
BILIRUB SERPL-MCNC: ABNORMAL MG/DL
BLOOD URINE, POC: 250
COLOR, POC UA: YELLOW
GLUCOSE UR QL STRIP: ABNORMAL
KETONES UR QL STRIP: ABNORMAL
LEUKOCYTE ESTERASE URINE, POC: ABNORMAL
NITRITE, POC UA: ABNORMAL
PH, POC UA: 5
PROTEIN, POC: ABNORMAL
SPECIFIC GRAVITY, POC UA: 1.01
UROBILINOGEN, POC UA: ABNORMAL

## 2018-02-20 PROCEDURE — 81002 URINALYSIS NONAUTO W/O SCOPE: CPT | Mod: S$GLB,,, | Performed by: NURSE PRACTITIONER

## 2018-02-20 PROCEDURE — 87077 CULTURE AEROBIC IDENTIFY: CPT

## 2018-02-20 PROCEDURE — 87086 URINE CULTURE/COLONY COUNT: CPT

## 2018-02-20 PROCEDURE — 1159F MED LIST DOCD IN RCRD: CPT | Mod: S$GLB,,, | Performed by: NURSE PRACTITIONER

## 2018-02-20 PROCEDURE — 87186 SC STD MICRODIL/AGAR DIL: CPT

## 2018-02-20 PROCEDURE — 87088 URINE BACTERIA CULTURE: CPT

## 2018-02-20 PROCEDURE — 1125F AMNT PAIN NOTED PAIN PRSNT: CPT | Mod: S$GLB,,, | Performed by: NURSE PRACTITIONER

## 2018-02-20 PROCEDURE — 99214 OFFICE O/P EST MOD 30 MIN: CPT | Mod: 25,S$GLB,, | Performed by: NURSE PRACTITIONER

## 2018-02-20 RX ORDER — LISINOPRIL 40 MG/1
40 TABLET ORAL DAILY
Qty: 90 TABLET | Refills: 3 | Status: SHIPPED | OUTPATIENT
Start: 2018-02-20 | End: 2019-03-12 | Stop reason: SDUPTHER

## 2018-02-20 RX ORDER — NITROFURANTOIN (MACROCRYSTALS) 100 MG/1
100 CAPSULE ORAL EVERY 12 HOURS
Qty: 20 CAPSULE | Refills: 0 | Status: SHIPPED | OUTPATIENT
Start: 2018-02-20 | End: 2019-01-11 | Stop reason: ALTCHOICE

## 2018-02-20 NOTE — PROGRESS NOTES
Subjective:       Patient ID: Ama Lang is a 89 y.o. female.    Chief Complaint: Urinary Tract Infection  Was in hospital in December for low sodium after starting hctz for b/p. Has follow up with nephologist this week, has elevated calcium as well, was told to stop taking calcium supplements.  Urinary Tract Infection    This is a recurrent problem. The current episode started in the past 7 days (started a couple of days ago). The problem occurs intermittently. Quality: abdominal pain. There has been no fever. Associated symptoms include frequency. Pertinent negatives include no flank pain. Associated symptoms comments: Had back pain earlier. She has tried increased fluids and NSAIDs (gas x) for the symptoms. The treatment provided mild relief. Her past medical history is significant for recurrent UTIs.   Was on hiprex in the past, but stopped taking it when she went to the hospital in December. Has been to the urologist, but did not want to follow up with her. Has had minimal testing to date. Is reluctant to go to another urologist.  Review of Systems   Gastrointestinal: Positive for abdominal pain.   Genitourinary: Positive for frequency. Negative for flank pain.   Musculoskeletal: Positive for back pain.       Objective:    U/A positive for leukocytes 2+, nitrites positive, blood 250  Physical Exam   Constitutional: She is oriented to person, place, and time. She appears well-developed and well-nourished. No distress.   HENT:   Head: Normocephalic and atraumatic.   Eyes: Conjunctivae are normal. Right eye exhibits no discharge. Left eye exhibits no discharge. No scleral icterus.   Cardiovascular: Normal rate and normal heart sounds.  Exam reveals no gallop and no friction rub.    No murmur heard.  Irregular rhythm.   Pulmonary/Chest: Effort normal and breath sounds normal. No respiratory distress. She has no wheezes. She has no rales.   Musculoskeletal:   No tenderness with percussion of spine or bilateral  CVAs   Neurological: She is alert and oriented to person, place, and time.   Skin: Skin is warm and dry. She is not diaphoretic.   Psychiatric: She has a normal mood and affect. Her behavior is normal.   Nursing note and vitals reviewed.      CMP  Sodium   Date Value Ref Range Status   01/25/2018 140 136 - 145 mmol/L Final     Potassium   Date Value Ref Range Status   01/25/2018 4.5 3.5 - 5.1 mmol/L Final     Chloride   Date Value Ref Range Status   01/25/2018 104 95 - 110 mmol/L Final     CO2   Date Value Ref Range Status   01/25/2018 28 23 - 29 mmol/L Final     Glucose   Date Value Ref Range Status   01/25/2018 98 70 - 110 mg/dL Final     BUN, Bld   Date Value Ref Range Status   01/25/2018 8 8 - 23 mg/dL Final     Creatinine   Date Value Ref Range Status   01/25/2018 0.8 0.5 - 1.4 mg/dL Final     Calcium   Date Value Ref Range Status   01/25/2018 10.8 (H) 8.7 - 10.5 mg/dL Final     Total Protein   Date Value Ref Range Status   01/25/2018 7.3 6.0 - 8.4 g/dL Final     Albumin   Date Value Ref Range Status   01/25/2018 4.0 3.5 - 5.2 g/dL Final     Total Bilirubin   Date Value Ref Range Status   01/25/2018 1.1 (H) 0.1 - 1.0 mg/dL Final     Comment:     For infants and newborns, interpretation of results should be based  on gestational age, weight and in agreement with clinical  observations.  Premature Infant recommended reference ranges:  Up to 24 hours.............<8.0 mg/dL  Up to 48 hours............<12.0 mg/dL  3-5 days..................<15.0 mg/dL  6-29 days.................<15.0 mg/dL       Alkaline Phosphatase   Date Value Ref Range Status   01/25/2018 89 55 - 135 U/L Final     AST   Date Value Ref Range Status   01/25/2018 27 10 - 40 U/L Final     ALT   Date Value Ref Range Status   01/25/2018 15 10 - 44 U/L Final     Anion Gap   Date Value Ref Range Status   01/25/2018 8 8 - 16 mmol/L Final     eGFR if    Date Value Ref Range Status   01/25/2018 >60.0 >60 mL/min/1.73 m^2 Final     eGFR if non     Date Value Ref Range Status   01/25/2018 >60.0 >60 mL/min/1.73 m^2 Final     Comment:     Calculation used to obtain the estimated glomerular filtration  rate (eGFR) is the CKD-EPI equation.          Assessment:     This provider spent more than 25 minutes face to face with patient, more than half the time for counseling and coordination of care as noted.    1. Chronic UTI    2. Acute cystitis with hematuria    3. Chronic atrial fibrillation    4. Essential hypertension    5. Hypercalcemia        Plan:       Chronic UTI  -     Ambulatory Referral to Urology    Acute cystitis with hematuria  -     nitrofurantoin (MACRODANTIN) 100 MG capsule; Take 1 capsule (100 mg total) by mouth every 12 (twelve) hours.  Dispense: 20 capsule; Refill: 0  -     POCT urine dipstick without microscope  -     Urine culture    Chronic atrial fibrillation    Essential hypertension  -     lisinopril (PRINIVIL,ZESTRIL) 40 MG tablet; Take 1 tablet (40 mg total) by mouth once daily.  Dispense: 90 tablet; Refill: 3  -     Comprehensive metabolic panel; Future; Expected date: 03/06/2018    Hypercalcemia  -     PTH, intact; Future; Expected date: 03/06/2018         2 weeks with labs prior  Had long discussion about the possible reasons for repeated UTIs, has seen a couple of urologists in the past and is not wanting to see another one. Explained that we need more testing to be sure what is causing the repeated UTIs and we do not want to miss a bladder cancer. Go th the bathroom every time you feel like you have to go, DO NOT hold it.

## 2018-02-22 LAB — BACTERIA UR CULT: NORMAL

## 2018-02-28 ENCOUNTER — TELEPHONE (OUTPATIENT)
Dept: FAMILY MEDICINE | Facility: CLINIC | Age: 83
End: 2018-02-28

## 2018-02-28 NOTE — TELEPHONE ENCOUNTER
Spoke with patient.  Daughter was concerned that she was on antibiotic for too long.  reviewed results with patient.  verbalized understanding.  States she is felling better.

## 2018-02-28 NOTE — TELEPHONE ENCOUNTER
----- Message from Concepción Schroeder sent at 2/28/2018  9:29 AM CST -----  Contact: Daughter Bertrand Chairez states the antibiotics  that was given to patient is the wrong dose then was told at the office to take   Please call patient to advise the correct dose [phone]

## 2018-03-05 DIAGNOSIS — J30.1 NON-SEASONAL ALLERGIC RHINITIS DUE TO POLLEN: ICD-10-CM

## 2018-03-05 RX ORDER — FLUTICASONE PROPIONATE 50 MCG
SPRAY, SUSPENSION (ML) NASAL
Qty: 16 G | Refills: 11 | Status: SHIPPED | OUTPATIENT
Start: 2018-03-05 | End: 2019-01-14 | Stop reason: SDUPTHER

## 2018-06-29 ENCOUNTER — HOSPITAL ENCOUNTER (OUTPATIENT)
Dept: RADIOLOGY | Facility: CLINIC | Age: 83
Discharge: HOME | End: 2018-06-29
Attending: FAMILY MEDICINE
Payer: MEDICARE

## 2018-06-29 ENCOUNTER — OFFICE VISIT (OUTPATIENT)
Dept: FAMILY MEDICINE | Facility: CLINIC | Age: 83
End: 2018-06-29
Attending: FAMILY MEDICINE
Payer: MEDICARE

## 2018-06-29 VITALS
WEIGHT: 142.44 LBS | OXYGEN SATURATION: 95 % | RESPIRATION RATE: 17 BRPM | TEMPERATURE: 98 F | DIASTOLIC BLOOD PRESSURE: 80 MMHG | SYSTOLIC BLOOD PRESSURE: 138 MMHG | BODY MASS INDEX: 24.32 KG/M2 | HEART RATE: 78 BPM | HEIGHT: 64 IN

## 2018-06-29 DIAGNOSIS — E83.52 HYPERCALCEMIA: Primary | ICD-10-CM

## 2018-06-29 DIAGNOSIS — E21.3 HYPERPARATHYROIDISM: ICD-10-CM

## 2018-06-29 DIAGNOSIS — E83.52 HYPERCALCEMIA: ICD-10-CM

## 2018-06-29 PROCEDURE — 99999 PR PBB SHADOW E&M-EST. PATIENT-LVL IV: CPT | Mod: PBBFAC,,, | Performed by: FAMILY MEDICINE

## 2018-06-29 PROCEDURE — 76536 US EXAM OF HEAD AND NECK: CPT | Mod: TC,PO

## 2018-06-29 PROCEDURE — 99214 OFFICE O/P EST MOD 30 MIN: CPT | Mod: PBBFAC,PO,25 | Performed by: FAMILY MEDICINE

## 2018-06-29 PROCEDURE — 76536 US EXAM OF HEAD AND NECK: CPT | Mod: 26,,, | Performed by: RADIOLOGY

## 2018-06-29 PROCEDURE — 99214 OFFICE O/P EST MOD 30 MIN: CPT | Mod: S$PBB,,, | Performed by: FAMILY MEDICINE

## 2018-06-29 NOTE — PROGRESS NOTES
Subjective:       Patient ID: Ama Lang is a 89 y.o. female.    Chief Complaint: Follow-up    89-year-old female who's had a mild elevation of calcium levels for some time was in [facility] for another problem and turned over to an unknown specialist who had planned to do a nuclear parathyroid scan possibly followed by surgery.  The patient is unsettled and not really enthusiastic about having surgery at the age of 89.  She is asymptomatic other than some fatigue.  She does not have excessive calcium intake, only a small amount in her ocular vitamins.    Past Medical History:  No date: *Atrial fibrillation      Comment: Dr. Mullen  No date: Cataract  No date: Diverticulitis  No date: Hyperlipidemia  No date: Hypertension  No date: Hypothyroidism    Past Surgical History:  12/18/2003: COLONOSCOPY      Comment: Dr. Santoyo  2008: CYSTOSCOPY      Comment: Dr. Nguyen  No date: DILATION AND CURETTAGE OF UTERUS  No date: EYE SURGERY  No date: TUBAL LIGATION    Current Outpatient Prescriptions on File Prior to Visit:  apixaban (ELIQUIS) 2.5 mg Tab, Take 2.5 mg by mouth 2 (two) times daily. , Disp: , Rfl:   atenolol (TENORMIN) 50 MG tablet, Take 1 tablet (50 mg total) by mouth 2 (two) times daily., Disp: 180 tablet, Rfl: 0  azelastine (ASTELIN) 137 mcg (0.1 %) nasal spray, 1 spray (137 mcg total) by Nasal route 2 (two) times daily., Disp: 30 mL, Rfl: 11  CALCIUM ORAL, Take 1 tablet by mouth once daily., Disp: , Rfl:   conjugated estrogens (PREMARIN) vaginal cream, Use a pea size amount every night for a week, then cut back to twice a week., Disp: 30 g, Rfl: 11  fluticasone (FLONASE) 50 mcg/actuation nasal spray, SNIFF 2 SPRAYS INTO EACH NOSTRIL ONCE DAILY, Disp: 16 g, Rfl: 11  hydrocortisone 2.5 % cream, Apply topically 2 (two) times daily., Disp: , Rfl:   ketoconazole (NIZORAL) 2 % shampoo, APPLY TO AFFECTED AREA ON THE SKIN 3 TIMES A week, Disp: , Rfl: 5  levothyroxine (SYNTHROID) 50 MCG tablet, Take  1 tablet (50 mcg total) by mouth once daily., Disp: 90 tablet, Rfl: 3  lisinopril (PRINIVIL,ZESTRIL) 40 MG tablet, Take 1 tablet (40 mg total) by mouth once daily., Disp: 90 tablet, Rfl: 3  multivitamin (ONE DAILY MULTIVITAMIN) per tablet, Take 1 tablet by mouth once daily., Disp: , Rfl:   nitrofurantoin (MACRODANTIN) 100 MG capsule, Take 1 capsule (100 mg total) by mouth every 12 (twelve) hours., Disp: 20 capsule, Rfl: 0  simvastatin (ZOCOR) 20 MG tablet, TAKE 1 TABLET (20 MG TOTAL) BY MOUTH EVERY EVENING., Disp: 30 tablet, Rfl: 10  triamcinolone acetonide 0.025% (KENALOG) 0.025 % cream, 1 application 2 (two) times daily as needed. Apply to affected area, Disp: , Rfl: 5  VIT C/VIT E/LUTEIN/MIN/OMEGA-3 (OCUVITE ORAL), Take 1 tablet by mouth once daily., Disp: , Rfl:   ACETAMINOPHEN (TYLENOL 8 HOUR ORAL), Take 2 capsules by mouth daily as needed., Disp: , Rfl:     No current facility-administered medications on file prior to visit.             Review of Systems   Constitutional: Positive for fatigue.   Gastrointestinal: Negative for abdominal pain, nausea and vomiting.   Neurological: Negative for tremors, seizures and weakness.       Objective:      Physical Exam   Constitutional: She is oriented to person, place, and time. She appears well-developed and well-nourished.   Good blood pressure control  Normal weight with a BMI of 24.5 she is up 2.5 pounds from her last visit with me December 19, 2017   HENT:   Head: Normocephalic and atraumatic.   Right Ear: External ear normal.   Left Ear: External ear normal.   Nose: Nose normal.   Mouth/Throat: Oropharynx is clear and moist. No oropharyngeal exudate.   Eyes: EOM are normal. Pupils are equal, round, and reactive to light. No scleral icterus.   Neck: Normal range of motion. Neck supple. No JVD present. No tracheal deviation present. No thyromegaly (No nodules palpable) present.   Cardiovascular: Normal rate, regular rhythm and normal heart sounds.  Exam reveals no  gallop and no friction rub.    No murmur heard.  Pulmonary/Chest: Effort normal and breath sounds normal. No respiratory distress. She has no wheezes. She has no rales. She exhibits no tenderness.   Abdominal: Soft. Bowel sounds are normal. She exhibits no distension and no mass. There is no tenderness. There is no rebound and no guarding.   Lymphadenopathy:     She has no cervical adenopathy.   Neurological: She is alert and oriented to person, place, and time. She displays normal reflexes. No cranial nerve deficit or sensory deficit. She exhibits normal muscle tone.   Skin: She is not diaphoretic.   Psychiatric: She has a normal mood and affect. Her behavior is normal. Judgment and thought content normal.   Nursing note and vitals reviewed.      Assessment:       1. Hypercalcemia    2. Hyperparathyroidism        Plan:       1. Hypercalcemia  BMP  Lab Results   Component Value Date     01/25/2018    K 4.5 01/25/2018     01/25/2018    CO2 28 01/25/2018    BUN 8 01/25/2018    CREATININE 0.8 01/25/2018    CALCIUM 10.8 (H) 01/25/2018    ANIONGAP 8 01/25/2018    ESTGFRAFRICA >60.0 01/25/2018    EGFRNONAA >60.0 01/25/2018     Her most recent parathyroid hormone level was 151 taken in February at [facility] with high normal being 79  - Basic metabolic panel; Future  - Vitamin D; Future  - PTH, intact; Future  - US Soft Tissue Head Neck Thyroid; Future  - Ambulatory referral to Endocrinology    2. Hyperparathyroidism  Patient desires to not have surgery if possible.  She appears to be relatively asymptomatic and her most recent calcium and actually returned to a normal level in February at [facility].  Will reassess calcium, PTH, vitamin D and do a ultrasound to see if we can spot an abnormal without using radiation.  We'll get endocrine consult.  But I suspect this may be able to be monitored.  Osteoporosis would be a concern but if it's under control and think that could be monitored as well.  - Basic metabolic panel;  Future  - Vitamin D; Future  - PTH, intact; Future  - US Soft Tissue Head Neck Thyroid; Future  - Ambulatory referral to Endocrinology

## 2018-07-05 ENCOUNTER — TELEPHONE (OUTPATIENT)
Dept: FAMILY MEDICINE | Facility: CLINIC | Age: 83
End: 2018-07-05

## 2018-07-05 NOTE — TELEPHONE ENCOUNTER
----- Message from Concepción Schroeder sent at 7/5/2018  9:13 AM CDT -----  Contact: Self  Type:  Patient Returning Call    Who Called:  Patient   Who Left Message for Patient:  Nurse   Does the patient know what this is regarding?:    Best Call Back Number:  [phone]   Additional Information:  Returning call

## 2018-07-06 ENCOUNTER — OFFICE VISIT (OUTPATIENT)
Dept: ENDOCRINOLOGY | Facility: CLINIC | Age: 83
End: 2018-07-06
Payer: MEDICARE

## 2018-07-06 ENCOUNTER — LAB VISIT (OUTPATIENT)
Dept: LAB | Facility: HOSPITAL | Age: 83
End: 2018-07-06
Attending: PHYSICIAN ASSISTANT
Payer: MEDICARE

## 2018-07-06 VITALS
DIASTOLIC BLOOD PRESSURE: 70 MMHG | SYSTOLIC BLOOD PRESSURE: 151 MMHG | HEIGHT: 64 IN | TEMPERATURE: 98 F | HEART RATE: 72 BPM | WEIGHT: 141.56 LBS | BODY MASS INDEX: 24.17 KG/M2

## 2018-07-06 DIAGNOSIS — E21.3 HYPERPARATHYROIDISM: ICD-10-CM

## 2018-07-06 DIAGNOSIS — E03.9 HYPOTHYROIDISM, UNSPECIFIED TYPE: ICD-10-CM

## 2018-07-06 DIAGNOSIS — Z78.0 POSTMENOPAUSAL: ICD-10-CM

## 2018-07-06 DIAGNOSIS — R73.03 PRE-DIABETES: ICD-10-CM

## 2018-07-06 DIAGNOSIS — E55.9 HYPOVITAMINOSIS D: ICD-10-CM

## 2018-07-06 DIAGNOSIS — E21.3 HYPERPARATHYROIDISM: Primary | ICD-10-CM

## 2018-07-06 LAB
CA-I BLDV-SCNC: 1.41 MMOL/L
ESTIMATED AVG GLUCOSE: 114 MG/DL
HBA1C MFR BLD HPLC: 5.6 %
T3 SERPL-MCNC: 80 NG/DL
T4 FREE SERPL-MCNC: 1.07 NG/DL
TSH SERPL DL<=0.005 MIU/L-ACNC: 2.6 UIU/ML

## 2018-07-06 PROCEDURE — 82570 ASSAY OF URINE CREATININE: CPT

## 2018-07-06 PROCEDURE — 99203 OFFICE O/P NEW LOW 30 MIN: CPT | Mod: S$PBB,,, | Performed by: PHYSICIAN ASSISTANT

## 2018-07-06 PROCEDURE — 84432 ASSAY OF THYROGLOBULIN: CPT

## 2018-07-06 PROCEDURE — 82330 ASSAY OF CALCIUM: CPT

## 2018-07-06 PROCEDURE — 99214 OFFICE O/P EST MOD 30 MIN: CPT | Mod: PBBFAC,PO | Performed by: PHYSICIAN ASSISTANT

## 2018-07-06 PROCEDURE — 84443 ASSAY THYROID STIM HORMONE: CPT

## 2018-07-06 PROCEDURE — 83036 HEMOGLOBIN GLYCOSYLATED A1C: CPT

## 2018-07-06 PROCEDURE — 84439 ASSAY OF FREE THYROXINE: CPT

## 2018-07-06 PROCEDURE — 82340 ASSAY OF CALCIUM IN URINE: CPT

## 2018-07-06 PROCEDURE — 84480 ASSAY TRIIODOTHYRONINE (T3): CPT

## 2018-07-06 PROCEDURE — 99999 PR PBB SHADOW E&M-EST. PATIENT-LVL IV: CPT | Mod: PBBFAC,,, | Performed by: PHYSICIAN ASSISTANT

## 2018-07-06 NOTE — PROGRESS NOTES
"CC: Hypercalcemia    HPI: Ama Lang is a 89 y.o. female here for hypercalcemia along with pending conditions listed in the Visit Diagnosis. She found out she had hypercalcemia in November for when she was admitted to the hospital for low sodium after taking a diurectic to lower her ca. No fhx of DM or thyroid disease.    New to me and endocrine today.    She also has hypothyroidism.    PMHx, PSHx: reviewed in epic.    Social Hx: no ETOH/tobacco use.     No n/v. Reports abdominal pain that is improved after going to the bathroom. No hx of kidney stones. Not taking ca or vitamin d.     Last thyroid u/s 6/18 showed on nodule in the left lobe.    ROS:   Constitutional: fatigue, No recent significant weight change  Eyes: No recent visual changes  Cardiovascular: afib, no cp  Respiratory: Denies current respiratory difficulty  Gastrointestinal: Denies recent bowel disturbances  GenitoUrinary -frequent UTIs, No dysuria  Skin: dry skin  Neurologic: numbness and tingling in hands and feet.  Musc: no muscle aches or jt pain  Remainder ROS negative     BP (!) 151/70   Pulse 72   Temp 97.8 °F (36.6 °C) (Oral)   Ht 5' 4" (1.626 m)   Wt 64.2 kg (141 lb 8.6 oz)   BMI 24.29 kg/m²      Lab Results   Component Value Date    TSH 2.596 07/06/2018    F6WEASM 80 07/06/2018    O2OWTRT 8.6 07/09/2009    FREET4 1.07 07/06/2018      Lab Results   Component Value Date    .0 (H) 06/29/2018    CALCIUM 11.2 (H) 06/29/2018    CAION 1.41 07/06/2018        Chemistry        Component Value Date/Time     06/29/2018 1058    K 4.1 06/29/2018 1058     06/29/2018 1058    CO2 29 06/29/2018 1058    BUN 10 06/29/2018 1058    CREATININE 0.8 06/29/2018 1058     06/29/2018 1058        Component Value Date/Time    CALCIUM 11.2 (H) 06/29/2018 1058    ALKPHOS 89 01/25/2018 0852    AST 27 01/25/2018 0852    ALT 15 01/25/2018 0852    BILITOT 1.1 (H) 01/25/2018 0852    ESTGFRAFRICA >60.0 06/29/2018 1058    EGFRNONAA >60.0 " 06/29/2018 1058         Lab Results   Component Value Date    HGBA1C 5.6 07/06/2018    HGBA1C 6.0 11/05/2013    HGBA1C 6.1 09/18/2012      PE:  GENERAL: Elderly female, well developed, well nourished  NECK: Supple neck, normal thyroid. No bruit. No AN.   LYMPHATIC: No cervical or supraclavicular lymphadenopathy  CARDIOVASCULAR: Normal heart sounds, no pedal edema  RESPIRATORY: Normal effort, CTAB  ABDOMEN: soft, non-tender, non-distended.  FEET: appropriate footwear.   NEURO: steady gait, CN ll-Xll  PSYCH: normal mood and affect    Assessment/Plan:   1. Hyperparathyroidism  Creatinine, urine, timed 24 Hours    Calcium, Timed Urine [facility]; 24 Hours   2. Pre-diabetes  Hemoglobin A1c   3. Hypothyroidism, unspecified type  T4, free    T3    Thyroglobulin    TSH   4. Postmenopausal  Calcium, ionized    DXA Bone Density Spine And Hip     Hyperparathyroidism-24 hr urine ca/cr.     Pre-diabetes-BG has decreased. Monitor    Hypothyroidism-TFTs are normal. Continue levothyroxine 50 mcg daily.   Postmenopausal-DEXA scan.   Hypovitaminosis D-start ergocalciferol daily for 30 days then start 2000 IU of vitamin D daily.     RTC in 4 mths

## 2018-07-06 NOTE — LETTER
July 9, 2018      Dale Fulton MD  [address]  [place] [address]           [place] [facility]  [address]  [place] [address]  Phone: [phone]          Patient: Ama Lang   MR Number: [id number]   YOB: 1928   Date of Visit: 7/6/2018       Dear Dr. Dale Fulton:    Thank you for referring Ama Lang to me for evaluation. Attached you will find relevant portions of my assessment and plan of care.    If you have questions, please do not hesitate to call me. I look forward to following Ama Lang along with you.    Sincerely,    Junior Eli PA-C    Enclosure  CC:  No Recipients    If you would like to receive this communication electronically, please contact [email or web address] or [phone] to request more information on [facility] Link access.    For providers and/or their staff who would like to refer a patient to [facility], please contact us through our one-stop-shop provider referral line, [facility] , at [phone].    If you feel you have received this communication in error or would no longer like to receive these types of communications, please e-mail [email or web address]

## 2018-07-09 ENCOUNTER — HOSPITAL ENCOUNTER (OUTPATIENT)
Dept: RADIOLOGY | Facility: CLINIC | Age: 83
Discharge: HOME | End: 2018-07-09
Attending: PHYSICIAN ASSISTANT
Payer: MEDICARE

## 2018-07-09 DIAGNOSIS — Z78.0 POSTMENOPAUSAL: ICD-10-CM

## 2018-07-09 PROCEDURE — 77080 DXA BONE DENSITY AXIAL: CPT | Mod: TC,PO

## 2018-07-09 PROCEDURE — 77080 DXA BONE DENSITY AXIAL: CPT | Mod: 26,,, | Performed by: RADIOLOGY

## 2018-07-09 RX ORDER — ERGOCALCIFEROL 1.25 MG/1
50000 CAPSULE ORAL DAILY
Qty: 30 CAPSULE | Refills: 0 | Status: SHIPPED | OUTPATIENT
Start: 2018-07-09 | End: 2019-10-04 | Stop reason: ALTCHOICE

## 2018-07-10 LAB
CALCIUM 24H UR-MRATE: 7 MG/HR
CALCIUM UR-MCNC: 9.7 MG/DL
CALCIUM URINE (MG/SPEC): 170 MG/SPEC
CREAT 24H UR-MRATE: 29.9 MG/HR
CREAT UR-MCNC: 41 MG/DL
CREATININE, URINE (MG/SPEC): 717.5 MG/SPEC
THRYOGLOBULIN INTERPRETATION: ABNORMAL
THYROGLOB AB SERPL-ACNC: <1.8 IU/ML
THYROGLOB SERPL-MCNC: 12 NG/ML
URINE COLLECTION DURATION: 24 HR
URINE COLLECTION DURATION: 24 HR
URINE VOLUME: 1750 ML
URINE VOLUME: 1750 ML

## 2018-07-16 DIAGNOSIS — M81.0 OSTEOPOROSIS, UNSPECIFIED OSTEOPOROSIS TYPE, UNSPECIFIED PATHOLOGICAL FRACTURE PRESENCE: Primary | ICD-10-CM

## 2018-07-16 RX ORDER — RISEDRONATE SODIUM 35 MG/1
35 TABLET, FILM COATED ORAL
Qty: 4 TABLET | Refills: 11 | Status: SHIPPED | OUTPATIENT
Start: 2018-07-16 | End: 2019-05-10

## 2018-07-16 NOTE — PROGRESS NOTES
Spoke to Ms. Lang and Daughter about her DEXA scan. Disscused side effects. Pt wants medication sent to [facility].

## 2018-10-11 DIAGNOSIS — J30.2 CHRONIC SEASONAL ALLERGIC RHINITIS: ICD-10-CM

## 2018-10-11 RX ORDER — AZELASTINE 1 MG/ML
SPRAY, METERED NASAL
Qty: 30 ML | Refills: 1 | Status: SHIPPED | OUTPATIENT
Start: 2018-10-11 | End: 2020-06-02

## 2018-11-27 ENCOUNTER — OFFICE VISIT (OUTPATIENT)
Dept: ENDOCRINOLOGY | Facility: CLINIC | Age: 83
End: 2018-11-27
Attending: FAMILY MEDICINE
Payer: MEDICARE

## 2018-11-27 ENCOUNTER — LAB VISIT (OUTPATIENT)
Dept: LAB | Facility: HOSPITAL | Age: 83
End: 2018-11-27
Attending: INTERNAL MEDICINE
Payer: MEDICARE

## 2018-11-27 VITALS
BODY MASS INDEX: 23.98 KG/M2 | TEMPERATURE: 98 F | DIASTOLIC BLOOD PRESSURE: 94 MMHG | WEIGHT: 140.44 LBS | HEART RATE: 77 BPM | HEIGHT: 64 IN | RESPIRATION RATE: 16 BRPM | SYSTOLIC BLOOD PRESSURE: 155 MMHG

## 2018-11-27 DIAGNOSIS — I10 ESSENTIAL HYPERTENSION: ICD-10-CM

## 2018-11-27 DIAGNOSIS — E78.00 HYPERCHOLESTEROLEMIA: ICD-10-CM

## 2018-11-27 DIAGNOSIS — E04.1 NODULAR THYROID DISEASE: ICD-10-CM

## 2018-11-27 DIAGNOSIS — Z78.0 POSTMENOPAUSAL: ICD-10-CM

## 2018-11-27 DIAGNOSIS — M81.0 OSTEOPOROSIS, UNSPECIFIED OSTEOPOROSIS TYPE, UNSPECIFIED PATHOLOGICAL FRACTURE PRESENCE: ICD-10-CM

## 2018-11-27 DIAGNOSIS — E21.0 PRIMARY HYPERPARATHYROIDISM: ICD-10-CM

## 2018-11-27 DIAGNOSIS — E03.9 ACQUIRED HYPOTHYROIDISM: ICD-10-CM

## 2018-11-27 DIAGNOSIS — E03.9 HYPOTHYROIDISM (ACQUIRED): ICD-10-CM

## 2018-11-27 DIAGNOSIS — E55.9 HYPOVITAMINOSIS D: ICD-10-CM

## 2018-11-27 DIAGNOSIS — E83.52 HYPERCALCEMIA: ICD-10-CM

## 2018-11-27 DIAGNOSIS — G56.01 CARPAL TUNNEL SYNDROME OF RIGHT WRIST: ICD-10-CM

## 2018-11-27 DIAGNOSIS — E83.52 HYPERCALCEMIA: Primary | ICD-10-CM

## 2018-11-27 DIAGNOSIS — I48.20 CHRONIC ATRIAL FIBRILLATION: ICD-10-CM

## 2018-11-27 DIAGNOSIS — E03.9 HYPOTHYROIDISM, UNSPECIFIED TYPE: ICD-10-CM

## 2018-11-27 LAB
25(OH)D3+25(OH)D2 SERPL-MCNC: 32 NG/ML
ALBUMIN SERPL BCP-MCNC: 4 G/DL
ALP SERPL-CCNC: 82 U/L
ALT SERPL W/O P-5'-P-CCNC: 15 U/L
ANION GAP SERPL CALC-SCNC: 8 MMOL/L
AST SERPL-CCNC: 25 U/L
BILIRUB SERPL-MCNC: 0.9 MG/DL
BUN SERPL-MCNC: 14 MG/DL
CA-I BLDV-SCNC: 1.38 MMOL/L
CALCIUM SERPL-MCNC: 10.6 MG/DL
CHLORIDE SERPL-SCNC: 103 MMOL/L
CO2 SERPL-SCNC: 27 MMOL/L
CREAT SERPL-MCNC: 0.9 MG/DL
EST. GFR  (AFRICAN AMERICAN): >60 ML/MIN/1.73 M^2
EST. GFR  (NON AFRICAN AMERICAN): 56.5 ML/MIN/1.73 M^2
GLUCOSE SERPL-MCNC: 102 MG/DL
MAGNESIUM SERPL-MCNC: 2.2 MG/DL
PHOSPHATE SERPL-MCNC: 2.9 MG/DL
POTASSIUM SERPL-SCNC: 4.2 MMOL/L
PROT SERPL-MCNC: 7.2 G/DL
PTH-INTACT SERPL-MCNC: 180 PG/ML
SODIUM SERPL-SCNC: 138 MMOL/L
T3 SERPL-MCNC: 76 NG/DL
T4 FREE SERPL-MCNC: 0.92 NG/DL
TSH SERPL DL<=0.005 MIU/L-ACNC: 7.67 UIU/ML
URATE SERPL-MCNC: 5.3 MG/DL

## 2018-11-27 PROCEDURE — 82088 ASSAY OF ALDOSTERONE: CPT

## 2018-11-27 PROCEDURE — 80053 COMPREHEN METABOLIC PANEL: CPT

## 2018-11-27 PROCEDURE — 84480 ASSAY TRIIODOTHYRONINE (T3): CPT

## 2018-11-27 PROCEDURE — 83735 ASSAY OF MAGNESIUM: CPT

## 2018-11-27 PROCEDURE — 82652 VIT D 1 25-DIHYDROXY: CPT

## 2018-11-27 PROCEDURE — 82330 ASSAY OF CALCIUM: CPT

## 2018-11-27 PROCEDURE — 84550 ASSAY OF BLOOD/URIC ACID: CPT

## 2018-11-27 PROCEDURE — 84443 ASSAY THYROID STIM HORMONE: CPT

## 2018-11-27 PROCEDURE — 83970 ASSAY OF PARATHORMONE: CPT

## 2018-11-27 PROCEDURE — 82306 VITAMIN D 25 HYDROXY: CPT

## 2018-11-27 PROCEDURE — 99999 PR PBB SHADOW E&M-EST. PATIENT-LVL IV: CPT | Mod: PBBFAC,,, | Performed by: INTERNAL MEDICINE

## 2018-11-27 PROCEDURE — 86316 IMMUNOASSAY TUMOR OTHER: CPT

## 2018-11-27 PROCEDURE — 84100 ASSAY OF PHOSPHORUS: CPT

## 2018-11-27 PROCEDURE — 84432 ASSAY OF THYROGLOBULIN: CPT

## 2018-11-27 PROCEDURE — 84439 ASSAY OF FREE THYROXINE: CPT

## 2018-11-27 PROCEDURE — 99214 OFFICE O/P EST MOD 30 MIN: CPT | Mod: S$PBB,,, | Performed by: INTERNAL MEDICINE

## 2018-11-27 PROCEDURE — 99214 OFFICE O/P EST MOD 30 MIN: CPT | Mod: PBBFAC,PO | Performed by: INTERNAL MEDICINE

## 2018-11-27 RX ORDER — ALENDRONATE SODIUM 70 MG/1
TABLET ORAL
Qty: 12 TABLET | Refills: 3 | Status: SHIPPED | OUTPATIENT
Start: 2018-11-27 | End: 2019-01-11 | Stop reason: ALTCHOICE

## 2018-11-27 RX ORDER — LEVOTHYROXINE SODIUM 75 UG/1
75 TABLET ORAL DAILY
Qty: 90 TABLET | Refills: 3 | Status: SHIPPED | OUTPATIENT
Start: 2018-11-27 | End: 2018-12-19 | Stop reason: SDUPTHER

## 2018-11-27 NOTE — LETTER
November 27, 2018      Dale Fulton MD  [address]  [place] [address]           [place] [facility]  [address]  [place] [address]  Phone: [phone]          Patient: Ama Lang   MR Number: [id number]   YOB: 1928   Date of Visit: 11/27/2018       Dear Dr. Dale Fulton:    Thank you for referring Ama Lang to me for evaluation. Attached you will find relevant portions of my assessment and plan of care.    If you have questions, please do not hesitate to call me. I look forward to following Ama Lang along with you.    Sincerely,    Geo Wong MD    Enclosure  CC:  No Recipients    If you would like to receive this communication electronically, please contact [email or web address] or [phone] to request more information on [facility] Link access.    For providers and/or their staff who would like to refer a patient to [facility], please contact us through our one-stop-shop provider referral line, [facility], at [phone].    If you feel you have received this communication in error or would no longer like to receive these types of communications, please e-mail [email or web address]

## 2018-11-27 NOTE — PROGRESS NOTES
Subjective:      Patient ID: Ama Lang is a 89 y.o. female.    Chief Complaint:      89 yr old postmenopausal lady seen by me for initial visit on account of hypercalcemia.    History of Present Illness      Patient is a 89 yr old postmenopausal lady seen for initial visit by me on account of presumed hypercalcemia.   The patient has been previously seen on this account earlier this year (07/18) my Chelita Eli PA-C.  The prior noted hypercalcemia was noted in the setting of diuretic use; HCTZ.  Her background comorbidities are as detailed below;    1. Hypercalcemia     2. Chronic atrial fibrillation     3. Hypovitaminosis D     4. Hypercholesterolemia     5. Postmenopausal     6. Hypothyroidism (acquired)     7. Osteoporosis, unspecified osteoporosis type, unspecified pathological fracture presence       Her most recent DEXA from 07/18 showed osteoporosis for which she is actonel therapy and her next DEXA should be for ~ 07/20.  She also had a screening thyroid USS done then which revealed thyroid nodular disease but none of the nodules were at the threshold to warrant biopsy. Her next surviellance thyroid USS should be for ~06/19.  The subsequent evaluation done at her initial visit to the endocrine showed no hypercalcemia of note.  Patient has not had any recent falls.   She has an extensive family history of [place]'s chorea that affected multiple family memebers including her , one of her daughters, one son and several uncles and aunts.        Review of Systems   Constitutional: Negative for diaphoresis, fatigue and unexpected weight change.   HENT: Positive for hearing loss (chronic and stable). Negative for facial swelling, trouble swallowing and voice change.    Eyes: Negative for photophobia and visual disturbance.   Respiratory: Negative for cough and shortness of breath.    Cardiovascular: Positive for palpitations (chronic and intermittent) and leg swelling (mild right ankle swelling ;  "chronic). Negative for chest pain.   Gastrointestinal: Negative for abdominal distention, abdominal pain and constipation.   Endocrine: Negative for polydipsia and polyuria.   Genitourinary: Negative for dysuria, frequency and menstrual problem.   Musculoskeletal: Positive for arthralgias (mainly in hands and wrist). Negative for gait problem.   Skin: Negative for color change, pallor and rash.   Neurological: Positive for dizziness (occasional; not posture related) and numbness (mainly in right hand and worse after extended period of use like doing house work, cleaning and washing dishes.). Negative for tremors and headaches.   Hematological: Bruises/bleeds easily.   Psychiatric/Behavioral: Negative for confusion, dysphoric mood and sleep disturbance.       Objective:   BP (!) 155/94 (BP Location: Right arm, Patient Position: Sitting, BP Method: Medium (Automatic))   Pulse 77   Temp 98 °F (36.7 °C) (Oral)   Resp 16   Ht 5' 4" (1.626 m)   Wt 63.7 kg (140 lb 6.9 oz)   BMI 24.11 kg/m²  Body surface area is 1.7 meters squared.         Physical Exam   Constitutional: She is oriented to person, place, and time. She appears well-developed and well-nourished. No distress.   Pleasant elderly lady. Clinically comfortable. Not pale, anicteric, afebrile, well hydrated but appears a little hard of  Hearing.   HENT:   Head: Normocephalic and atraumatic.   Mouth/Throat: No oropharyngeal exudate.   Eyes: Conjunctivae and EOM are normal. Pupils are equal, round, and reactive to light. No scleral icterus.   Has bilateral arcus senilis   Neck: Normal range of motion. Neck supple. No JVD present. No tracheal deviation present. No thyromegaly present.   No neck bruits   Cardiovascular: Normal rate. Exam reveals no friction rub.   No murmur heard.  Has irregular pulse.   Pulmonary/Chest: Effort normal and breath sounds normal. No stridor. No respiratory distress.   Abdominal: Soft. She exhibits no distension. There is no " tenderness.   Musculoskeletal: She exhibits edema (mild 1+ right pedal edema up to ankle; chronic and stable). She exhibits no tenderness.   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  Has +ve phalen's and Tinel's signs in the right hand.  No major gaite anomalies.   Lymphadenopathy:     She has no cervical adenopathy.   Neurological: She is alert and oriented to person, place, and time. No cranial nerve deficit.   Skin: Skin is warm and dry. No rash noted. She is not diaphoretic. No erythema. No pallor.   Age appropriate cutaneous atrophy with some old and fresh ecchymoses.   Psychiatric: She has a normal mood and affect. Her behavior is normal. Judgment and thought content normal.   Vitals reviewed.      Lab Review:     Results for ELIAS CAGLE (MRN [id number]) as of 11/27/2018 09:02   Ref. Range 7/6/2018 16:16 7/6/2018 16:16 7/6/2018 16:25 7/9/2018 11:49   Calcium, Ion Latest Ref Range: 1.06 - 1.42 mmol/L   1.41    Hemoglobin A1C Latest Ref Range: 4.0 - 5.6 %   5.6    Estimated Avg Glucose Latest Ref Range: 68 - 131 mg/dL   114    TSH Latest Ref Range: 0.400 - 4.000 uIU/mL   2.596    T3, Total Latest Ref Range: 60 - 180 ng/dL   80    Free T4 Latest Ref Range: 0.71 - 1.51 ng/dL   1.07    Thyroglobulin Interpretation Unknown   SEE BELOW    Thyroglobulin Antibody Screen Latest Ref Range: <4.0 IU/mL   <1.8    Thyroglobulin, Tumor Marker Latest Units: ng/mL   12 (H)    CREATININE, URINE (SEND OUT) Latest Ref Range: 15.0 - 325.0 mg/dL 41.0      Calcium, Urine Latest Ref Range: 0.0 - 15.0 mg/dL  9.7     Calcium, 24H Urine Latest Ref Range: 4 - 12 mg/Hr  7     CA Urine (mg/Spec) Latest Units: mg/Spec  170     Creatinine, Ur (mg/spec) Latest Units: mg/Spec 717.5      DEXA BONE DENSITY SPINE HIP Unknown    Rpt   Creatinine, Timed Urine Latest Ref Range: 40.0 - 75.0 mg/Hr 29.9 (L)      Urine Collection Duration Latest Units: Hr 24 24         Assessment:     1. Hypercalcemia  Calcium, ionized    Comprehensive  metabolic panel    PTH, intact    Calcitriol   2. Chronic atrial fibrillation     3. Hypovitaminosis D  Comprehensive metabolic panel    Vitamin D    Magnesium    Phosphorus    Calcitriol   4. Hypercholesterolemia  Comprehensive metabolic panel    Uric acid   5. Postmenopausal  Uric acid   6. Hypothyroidism (acquired)     7. Osteoporosis, unspecified osteoporosis type, unspecified pathological fracture presence  PTH, intact    Calcitriol   8. Nodular thyroid disease  Iodine, Serum    Calcitonin    Chromogranin A    Thyroglobulin    T4, free    T3    TSH   9. Hypothyroidism, unspecified type     10. Essential hypertension  Aldosterone    Renin    Microalbumin/creatinine urine ratio    Urinalysis        Regarding hypercalcemia; this appears resolved since she stopped HCTZ. To continue serial monitoring of calcium and other mineral panel labs.  Regarding hypovitaminosis D; to continue vitamin d repletion therapy as before.  Regarding osteoporosis; patient insurance plan did not cover actonel so will instead switch her to fosamax 70mg Q weekly for a 5 yr cycle. FFup DEXA; 07/20.   Regarding thyroid nodular disease; stable clinically. To obtain baseline labs as detailed above and for ffup thyroid USS ~ 06/19.  Regarding essential hypertension; has mild systolic hypertension but not symptomatic. For now no change to her present antihypertensive regimen but to continue serial tracking of ambulatory BP trends.  Regarding hyperlipidemia with hypercholesterolemia; to continue antilipidemic therapy as before.   Regarding chronic afibb; ongoing therapy and ffup as per cardiology service including beta blockade and chronic anticoagulation with Eliquis.  Regarding postmenopausal status; stable. To continue premarin cream for vaginal dryness and atrophy as before.  Regarding right carpal tunnel syndrome; advised to obtain wrist splint for symptom management.        Plan:     FFup in ~ 6mths

## 2018-11-28 LAB
IODINE SERPL-MCNC: 55 NG/ML (ref 40–92)
THRYOGLOBULIN INTERPRETATION: ABNORMAL
THYROGLOB AB SERPL-ACNC: <1.8 IU/ML
THYROGLOB SERPL-MCNC: 13 NG/ML

## 2018-11-29 LAB
1,25(OH)2D3 SERPL-MCNC: 89 PG/ML
ALDOST SERPL-MCNC: 5.3 NG/DL
CALCIT SERPL-MCNC: <5 PG/ML
RENIN PLAS-CCNC: <0.6 NG/ML/H

## 2018-11-30 LAB — CGA SERPL-MCNC: 158 NG/ML (ref 0–95)

## 2018-12-19 DIAGNOSIS — E03.9 ACQUIRED HYPOTHYROIDISM: ICD-10-CM

## 2018-12-19 NOTE — TELEPHONE ENCOUNTER
----- Message from Helen Tobar sent at 12/19/2018 10:47 AM CST -----  Type: Needs Medical Advice    Who Called:  Patient  Best Call Back Number: [phone]  Additional Information: Patient stated pharmacy still does not have medication:levothyroxine (SYNTHROID) 75 MCG tablet/please order and call patient back to advise.

## 2018-12-20 RX ORDER — LEVOTHYROXINE SODIUM 75 UG/1
75 TABLET ORAL DAILY
Qty: 90 TABLET | Refills: 3 | Status: SHIPPED | OUTPATIENT
Start: 2018-12-20 | End: 2019-09-27 | Stop reason: SDUPTHER

## 2019-01-11 ENCOUNTER — OFFICE VISIT (OUTPATIENT)
Dept: FAMILY MEDICINE | Facility: CLINIC | Age: 84
End: 2019-01-11
Payer: MEDICARE

## 2019-01-11 ENCOUNTER — TELEPHONE (OUTPATIENT)
Dept: FAMILY MEDICINE | Facility: CLINIC | Age: 84
End: 2019-01-11

## 2019-01-11 VITALS
HEART RATE: 92 BPM | OXYGEN SATURATION: 94 % | DIASTOLIC BLOOD PRESSURE: 80 MMHG | TEMPERATURE: 99 F | HEIGHT: 64 IN | BODY MASS INDEX: 24.41 KG/M2 | WEIGHT: 143 LBS | SYSTOLIC BLOOD PRESSURE: 144 MMHG

## 2019-01-11 DIAGNOSIS — M81.0 AGE-RELATED OSTEOPOROSIS WITHOUT CURRENT PATHOLOGICAL FRACTURE: ICD-10-CM

## 2019-01-11 DIAGNOSIS — I10 ESSENTIAL HYPERTENSION: ICD-10-CM

## 2019-01-11 DIAGNOSIS — J01.00 ACUTE NON-RECURRENT MAXILLARY SINUSITIS: Primary | ICD-10-CM

## 2019-01-11 DIAGNOSIS — I48.20 CHRONIC ATRIAL FIBRILLATION: ICD-10-CM

## 2019-01-11 DIAGNOSIS — E21.0 PRIMARY HYPERPARATHYROIDISM: ICD-10-CM

## 2019-01-11 DIAGNOSIS — R09.89 RHONCHI: ICD-10-CM

## 2019-01-11 DIAGNOSIS — E03.9 ACQUIRED HYPOTHYROIDISM: ICD-10-CM

## 2019-01-11 DIAGNOSIS — E78.5 HYPERLIPIDEMIA, UNSPECIFIED HYPERLIPIDEMIA TYPE: ICD-10-CM

## 2019-01-11 DIAGNOSIS — R05.9 COUGH: ICD-10-CM

## 2019-01-11 PROCEDURE — 99214 OFFICE O/P EST MOD 30 MIN: CPT | Mod: S$PBB,,, | Performed by: NURSE PRACTITIONER

## 2019-01-11 PROCEDURE — 99215 OFFICE O/P EST HI 40 MIN: CPT | Mod: PBBFAC,PO,25 | Performed by: NURSE PRACTITIONER

## 2019-01-11 PROCEDURE — 99214 PR OFFICE/OUTPT VISIT, EST, LEVL IV, 30-39 MIN: ICD-10-PCS | Mod: S$PBB,,, | Performed by: NURSE PRACTITIONER

## 2019-01-11 PROCEDURE — 99999 PR PBB SHADOW E&M-EST. PATIENT-LVL V: CPT | Mod: PBBFAC,,, | Performed by: NURSE PRACTITIONER

## 2019-01-11 PROCEDURE — 96372 THER/PROPH/DIAG INJ SC/IM: CPT | Mod: PBBFAC,PO

## 2019-01-11 PROCEDURE — 99999 PR PBB SHADOW E&M-EST. PATIENT-LVL V: ICD-10-PCS | Mod: PBBFAC,,, | Performed by: NURSE PRACTITIONER

## 2019-01-11 RX ORDER — GUAIFENESIN 1200 MG/1
1 TABLET, EXTENDED RELEASE ORAL 2 TIMES DAILY
Qty: 10 TABLET | Refills: 0 | COMMUNITY
Start: 2019-01-11 | End: 2019-01-21

## 2019-01-11 RX ORDER — AMOXICILLIN AND CLAVULANATE POTASSIUM 875; 125 MG/1; MG/1
1 TABLET, FILM COATED ORAL 2 TIMES DAILY WITH MEALS
Qty: 14 TABLET | Refills: 0 | Status: SHIPPED | OUTPATIENT
Start: 2019-01-11 | End: 2019-05-10 | Stop reason: ALTCHOICE

## 2019-01-11 RX ORDER — BETAMETHASONE SODIUM PHOSPHATE AND BETAMETHASONE ACETATE 3; 3 MG/ML; MG/ML
6 INJECTION, SUSPENSION INTRA-ARTICULAR; INTRALESIONAL; INTRAMUSCULAR; SOFT TISSUE
Status: COMPLETED | OUTPATIENT
Start: 2019-01-11 | End: 2019-01-11

## 2019-01-11 RX ADMIN — BETAMETHASONE ACETATE AND BETAMETHASONE SODIUM PHOSPHATE 6 MG: 3; 3 INJECTION, SUSPENSION INTRA-ARTICULAR; INTRALESIONAL; INTRAMUSCULAR; SOFT TISSUE at 05:01

## 2019-01-11 NOTE — TELEPHONE ENCOUNTER
----- Message from Kenna Ambrocio sent at 1/11/2019  8:40 AM CST -----  Contact: Ama  Type:  Same Day Appointment Request    Caller is requesting a same day appointment.  Caller declined first available appointment listed below.      Name of Caller:  patient  When is the first available appointment?  1/18/19  Symptoms:  Chest congestion/headache/back pain when coughs/no fever  Best Call Back Number:  [phone]  Additional Information:   Patient would like to be seen today if possible--ok to see NP--please advise--thank you

## 2019-01-11 NOTE — PROGRESS NOTES
Subjective:       Patient ID: Ama Lang is a 89 y.o. female.    Chief Complaint: Cough and Sinus Problem    Chief Complaint  Chief Complaint   Patient presents with    Cough    Sinus Problem       HPI  Ama Lang is a 89 y.o. female with medical diagnoses as listed in the medical history and problem list that presents with c/o a cough and sinus problem for 2 days. Patient c/o runny nose, cough that is productive at times, hoarse voice, chest tightness, and some SOB with wheezing at night. Patient is using her flonase as prescribed, and another nasal spray, possibly a decongestant without relief. Patient states more fatigued than usual. Denies chills and fever.  Patient denies aggravating factors.  Symptoms are constant.  Patient has atrial fibrillation that is chronic and is taking eliquis 2.5 mg BID. Patient has hypertension and blood pressure is 144/80 today. She is followed by endocrinology for hypothyroidism, hypercalcemia, and osteoporosis. BMI is 24.55 today and patient has gained 1 pound since visit 6/29/18.       PAST MEDICAL HISTORY:  Past Medical History:   Diagnosis Date    *Atrial fibrillation     Dr. Mullen    Cataract     Diverticulitis     Hyperlipidemia     Hypertension     Hypothyroidism        PAST SURGICAL HISTORY:  Past Surgical History:   Procedure Laterality Date    COLONOSCOPY  12/18/2003    Dr. Santoyo    CYSTOSCOPY  2008    Dr. Nguyen    DILATION AND CURETTAGE OF UTERUS      EYE SURGERY      TUBAL LIGATION         SOCIAL HISTORY:  Social History     Socioeconomic History    Marital status:      Spouse name: Not on file    Number of children: Not on file    Years of education: Not on file    Highest education level: Not on file   Social Needs    Financial resource strain: Not on file    Food insecurity - worry: Not on file    Food insecurity - inability: Not on file    Transportation needs - medical: Not on file    Transportation needs - non-medical:  Not on file   Occupational History    Not on file   Tobacco Use    Smoking status: Never Smoker    Smokeless tobacco: Never Used   Substance and Sexual Activity    Alcohol use: No    Drug use: No    Sexual activity: Not on file   Other Topics Concern    Not on file   Social History Narrative    Not on file       FAMILY HISTORY:  Family History   Problem Relation Age of Onset    Cancer Mother         ovarian    Heart disease Father     Crisp's disease Father     Macular degeneration Father     Heart disease Brother     Cancer Sister         blood    Cancer Brother     Crisp's disease Daughter     Arthritis Sister         spine    Carpal tunnel syndrome Son     Fibromyalgia Daughter     Crisp's disease Daughter     Arthritis Daughter         x2    Kidney cancer Neg Hx     Prostate cancer Neg Hx     Urolithiasis Neg Hx        ALLERGIES AND MEDICATIONS: updated and reviewed.  Review of patient's allergies indicates:   Allergen Reactions    Montelukast Other (See Comments)     Depleted sodium     Hydrochlorothiazide Other (See Comments)     Low sodium     Current Outpatient Medications   Medication Sig Dispense Refill    ACETAMINOPHEN (TYLENOL 8 HOUR ORAL) Take 2 capsules by mouth daily as needed.      apixaban (ELIQUIS) 2.5 mg Tab Take 2.5 mg by mouth 2 (two) times daily.       atenolol (TENORMIN) 50 MG tablet Take 1 tablet (50 mg total) by mouth 2 (two) times daily. 180 tablet 0    azelastine (ASTELIN) 137 mcg (0.1 %) nasal spray SPRAY 1 SPRAY INTO EACH NOSTRIL TWICE A DAY 30 mL 1    CALCIUM ORAL Take 1 tablet by mouth once daily.      ergocalciferol (ERGOCALCIFEROL) 50,000 unit Cap Take 1 capsule (50,000 Units total) by mouth once daily. Take 2000 IU of vitamin D daily after 30 days. 30 capsule 0    fluticasone (FLONASE) 50 mcg/actuation nasal spray SNIFF 2 SPRAYS INTO EACH NOSTRIL ONCE DAILY 16 g 11    hydrocortisone 2.5 % cream Apply topically 2 (two) times daily.       ketoconazole (NIZORAL) 2 % shampoo APPLY TO AFFECTED AREA ON THE SKIN 3 TIMES A week  5    levothyroxine (SYNTHROID) 75 MCG tablet Take 1 tablet (75 mcg total) by mouth once daily. 90 tablet 3    lisinopril (PRINIVIL,ZESTRIL) 40 MG tablet Take 1 tablet (40 mg total) by mouth once daily. 90 tablet 3    multivitamin (ONE DAILY MULTIVITAMIN) per tablet Take 1 tablet by mouth once daily.      risedronate (ACTONEL) 35 MG tablet Take 1 tablet (35 mg total) by mouth every 7 days. 4 tablet 11    simvastatin (ZOCOR) 20 MG tablet TAKE 1 TABLET (20 MG TOTAL) BY MOUTH EVERY EVENING. 30 tablet 10    triamcinolone acetonide 0.025% (KENALOG) 0.025 % cream 1 application 2 (two) times daily as needed. Apply to affected area  5    VIT C/VIT E/LUTEIN/MIN/OMEGA-3 (OCUVITE ORAL) Take 1 tablet by mouth once daily.      amoxicillin-clavulanate 875-125mg (AUGMENTIN) 875-125 mg per tablet Take 1 tablet by mouth 2 (two) times daily with meals. 14 tablet 0    guaiFENesin (MUCINEX) 1,200 mg Ta12 Take 1 tablet by mouth 2 (two) times daily. For expectorant for 10 days 10 tablet 0     No current facility-administered medications for this visit.        I have reviewed the patient's medical history in detail and updated the computerized patient record.    Review of Systems   Constitutional: Positive for fatigue. Negative for activity change, appetite change, chills and fever.        Very fatigued.   HENT: Positive for postnasal drip, rhinorrhea, sinus pressure, sinus pain and voice change. Negative for congestion, ear pain and sore throat.         Runny nose, post nasal drip, sinus pressure and pain, hoarse voice.   Eyes: Positive for visual disturbance.        States vision is not that good, eyes are getting dimmer, glasses to read   Respiratory: Positive for cough, chest tightness, shortness of breath and wheezing.         Productive cough, shortness of breath, chest tightness, and wheezing   Cardiovascular: Negative for chest pain  "and palpitations.   Gastrointestinal: Positive for diarrhea. Negative for abdominal pain, constipation, nausea and vomiting.        Diarrhea every now and then   Genitourinary: Negative for difficulty urinating and dysuria.   Musculoskeletal: Positive for back pain.        Pain between shoulder blades this morning.    Psychiatric/Behavioral: Positive for sleep disturbance.        Tossed and turned last night, napped this afternoon         Objective:      Vitals:    01/11/19 1600   BP: (!) 144/80   BP Location: Right arm   Patient Position: Sitting   BP Method: Large (Automatic)   Pulse: 92   Temp: 98.8 °F (37.1 °C)   TempSrc: Oral   SpO2: (!) 94%   Weight: 64.9 kg (143 lb)   Height: 5' 4" (1.626 m)     Physical Exam   Constitutional: She is oriented to person, place, and time. Vital signs are normal. She appears well-developed and well-nourished. No distress.   Blood pressure 144/80   HENT:   Head: Normocephalic and atraumatic.   Right Ear: External ear and ear canal normal. Tympanic membrane is erythematous.   Left Ear: Tympanic membrane, external ear and ear canal normal.   Nose: Mucosal edema present. Right sinus exhibits no maxillary sinus tenderness and no frontal sinus tenderness. Left sinus exhibits maxillary sinus tenderness. Left sinus exhibits no frontal sinus tenderness.   Mouth/Throat: Mucous membranes are normal. Oropharyngeal exudate present.   Right TM with erythema and opaque, bilateral nares with erythema and edema, left maxillary tenderness palpation. Throat with white PND noted   Eyes: Conjunctivae and lids are normal. Right eye exhibits no discharge. Left eye exhibits no discharge.   Neck: Normal range of motion. Neck supple. Normal carotid pulses present. Carotid bruit is not present.   Cardiovascular: Intact distal pulses and normal pulses. An irregularly irregular rhythm present.   No murmur heard.  Pulses:       Carotid pulses are 2+ on the right side, and 2+ on the left side.       Radial " pulses are 2+ on the right side, and 2+ on the left side.   Pulmonary/Chest: Effort normal. No respiratory distress. She has no decreased breath sounds. She has no wheezes. She has rhonchi in the left lower field. She has no rales.   Fine rhonchi to left lower lobe   Musculoskeletal: Normal range of motion.   Lymphadenopathy:     She has no cervical adenopathy.        Right: No supraclavicular adenopathy present.        Left: No supraclavicular adenopathy present.   Neurological: She is alert and oriented to person, place, and time. She has normal strength.   Skin: Skin is warm, dry and intact. She is not diaphoretic. No pallor.   Psychiatric: She has a normal mood and affect. Her speech is normal and behavior is normal. Judgment and thought content normal. Cognition and memory are normal.   Nursing note and vitals reviewed.        Assessment:       1. Acute non-recurrent maxillary sinusitis    2. Rhonchi    3. Cough    4. Chronic atrial fibrillation    5. Essential hypertension    6. Hyperlipidemia, unspecified hyperlipidemia type    7. Acquired hypothyroidism    8. Primary hyperparathyroidism    9. Age-related osteoporosis without current pathological fracture    10. BMI 24.0-24.9, adult          Plan:       Ama was seen today for cough and sinus problem.    Diagnoses and all orders for this visit:    Acute non-recurrent maxillary sinusitis  -     betamethasone acetate-betamethasone sodium phosphate injection 6 mg  -     amoxicillin-clavulanate 875-125mg (AUGMENTIN) 875-125 mg per tablet; Take 1 tablet by mouth 2 (two) times daily with meals.  - instructed to maintain hydration, get plenty of extra rest  - to continue Flonase nasal spray as prescribed, advised not to use a decongestant nasal spray    Rhonchi  -     betamethasone acetate-betamethasone sodium phosphate injection 6 mg  - oxygen saturation by pulse ox is 94% on room air  - no respiratory distress noted    Cough  -     betamethasone  acetate-betamethasone sodium phosphate injection 6 mg  -     guaiFENesin (MUCINEX) 1,200 mg Ta12; Take 1 tablet by mouth 2 (two) times daily. For expectorant for 10 days    Chronic atrial fibrillation   Patient heart auscultated for an irregularly irregular rhythm today   To continue Eliquis 2.5 mg b.i.d.   Follow-up with Cardiology as instructed  Essential hypertension   Blood pressure controlled 144/80, continue current medication without change   Current blood pressure appropriate for patient age  Hyperlipidemia, unspecified hyperlipidemia type     Lab Results   Component Value Date    CHOL 148 01/25/2018    CHOL 151 11/07/2016    CHOL 147 11/25/2015     Lab Results   Component Value Date    HDL 65 01/25/2018    HDL 58 11/07/2016    HDL 60 11/25/2015     Lab Results   Component Value Date    LDLCALC 70.0 01/25/2018    LDLCALC 73.4 11/07/2016    LDLCALC 73.0 11/25/2015     Lab Results   Component Value Date    TRIG 65 01/25/2018    TRIG 98 11/07/2016    TRIG 70 11/25/2015     Lab Results   Component Value Date    CHOLHDL 43.9 01/25/2018    CHOLHDL 38.4 11/07/2016    CHOLHDL 40.8 11/25/2015      Continue simvastatin 20 mg daily as prescribed  Acquired hypothyroidism     Lab Results   Component Value Date    TSH 7.671 (H) 11/27/2018    continue management with endocrinology, continue levothyroxine 75 mcg daily as prescribed  Primary hyperparathyroidism   Continue management and follow-up with endocrinology as instructed  Age-related osteoporosis without current pathological fracture   Continue Actonel 35 mg one tablet weekly   Continue management and follow-up with endocrinology as instructed  BMI 24.0-24.9, adult   BMI today is 24.55 and patient has gained 1 lb since her last visit on 6/29/2018   Maintain healthy weight with [facility] diet and exercise/active lifestyle    Follow-up if symptoms worsen or fail to improve.      Patient readiness: acceptance and barriers:none    During the course of the visit the patient  was educated and counseled about the following:     Hypertension:   Medication: no change.  Dietary sodium restriction.  Regular aerobic exercise.    Goals: Hypertension: Reduce Blood Pressure    Did patient meet goals/outcomes: Yes    The following self management tools provided: declined    Patient Instructions (the written plan) was given to the patient/family.     Time spent with patient: 30 minutes    Barriers to medications present (no )    Adverse reactions to current medications (no)    Over the counter medications reviewed (Yes)

## 2019-01-12 ENCOUNTER — NURSE TRIAGE (OUTPATIENT)
Dept: ADMINISTRATIVE | Facility: CLINIC | Age: 84
End: 2019-01-12

## 2019-01-12 DIAGNOSIS — J30.1 NON-SEASONAL ALLERGIC RHINITIS DUE TO POLLEN: ICD-10-CM

## 2019-01-12 NOTE — TELEPHONE ENCOUNTER
"  Reason for Disposition   [1] NIGHT SWEATS occur (e.g., drenching sweat that occurs at night and has to change bed clothes or bed sheets) AND [2] cause unknown    Answer Assessment - Initial Assessment Questions  1. ONSET: "When did the sweating start?"       Last night  2. LOCATION: "What part of your body has excessive sweating?" (e.g., entire body; just face, underarms, palms, or soles of feet).       Entire body. Changed clothes 4 times last night.  3. SEVERITY: "How bad is the sweating?"    (Scale 1-10; or mild, moderate, severe)    -  MILD (1-3): doesn't interfere with normal activities     -  MODERATE (4-7): interferes with normal activities (e.g., work or school) or awakens from sleep; causes embarrassment in social situations     -  SEVERE (8-10): drenching sweats and has to change bed clothes or bed linens.      Moderate  4. CAUSE: "What do you think is causing the sweating?"      Starting Augmentin last night  5. FEVER: "Have you been having fevers?"      No  6. OTHER SYMPTOMS: "Do you have any other symptoms?" (e.g., chest pain, difficulty breathing, lightheadedness, weight loss)      Heavy chest early yesterday morning but not now.    Protocols used:  [id number]-I-[id number]    "

## 2019-01-14 RX ORDER — FLUTICASONE PROPIONATE 50 MCG
SPRAY, SUSPENSION (ML) NASAL
Qty: 16 G | Refills: 11 | Status: SHIPPED | OUTPATIENT
Start: 2019-01-14 | End: 2020-02-04 | Stop reason: SDUPTHER

## 2019-01-14 NOTE — TELEPHONE ENCOUNTER
Called patient- she had sweating for one night that caused her to change clothes 3 times. Symptom has resolved. Pharmacy did not get the Flonase. Send to [facility] please.

## 2019-03-12 DIAGNOSIS — I10 ESSENTIAL HYPERTENSION: ICD-10-CM

## 2019-03-12 RX ORDER — LISINOPRIL 40 MG/1
TABLET ORAL
Qty: 90 TABLET | Refills: 3 | Status: SHIPPED | OUTPATIENT
Start: 2019-03-12 | End: 2020-03-17 | Stop reason: SDUPTHER

## 2019-05-10 ENCOUNTER — OFFICE VISIT (OUTPATIENT)
Dept: FAMILY MEDICINE | Facility: CLINIC | Age: 84
End: 2019-05-10
Payer: MEDICARE

## 2019-05-10 VITALS
DIASTOLIC BLOOD PRESSURE: 82 MMHG | SYSTOLIC BLOOD PRESSURE: 132 MMHG | HEIGHT: 64 IN | HEART RATE: 76 BPM | BODY MASS INDEX: 24.24 KG/M2 | WEIGHT: 142 LBS | TEMPERATURE: 98 F

## 2019-05-10 DIAGNOSIS — J30.9 ALLERGIC SINUSITIS: ICD-10-CM

## 2019-05-10 DIAGNOSIS — R80.8 OTHER PROTEINURIA: ICD-10-CM

## 2019-05-10 DIAGNOSIS — R35.0 FREQUENCY OF URINATION: Primary | ICD-10-CM

## 2019-05-10 DIAGNOSIS — I10 ESSENTIAL HYPERTENSION: Primary | ICD-10-CM

## 2019-05-10 DIAGNOSIS — N30.01 ACUTE CYSTITIS WITH HEMATURIA: ICD-10-CM

## 2019-05-10 DIAGNOSIS — E78.2 MIXED HYPERLIPIDEMIA: ICD-10-CM

## 2019-05-10 DIAGNOSIS — I48.20 CHRONIC ATRIAL FIBRILLATION: ICD-10-CM

## 2019-05-10 DIAGNOSIS — M81.0 AGE-RELATED OSTEOPOROSIS WITHOUT CURRENT PATHOLOGICAL FRACTURE: ICD-10-CM

## 2019-05-10 DIAGNOSIS — I10 ESSENTIAL HYPERTENSION: ICD-10-CM

## 2019-05-10 DIAGNOSIS — E03.9 ACQUIRED HYPOTHYROIDISM: ICD-10-CM

## 2019-05-10 PROCEDURE — 99214 OFFICE O/P EST MOD 30 MIN: CPT | Mod: S$PBB,,, | Performed by: NURSE PRACTITIONER

## 2019-05-10 PROCEDURE — 99999 PR PBB SHADOW E&M-EST. PATIENT-LVL IV: CPT | Mod: PBBFAC,,, | Performed by: NURSE PRACTITIONER

## 2019-05-10 PROCEDURE — 99214 PR OFFICE/OUTPT VISIT, EST, LEVL IV, 30-39 MIN: ICD-10-PCS | Mod: S$PBB,,, | Performed by: NURSE PRACTITIONER

## 2019-05-10 PROCEDURE — 87086 URINE CULTURE/COLONY COUNT: CPT

## 2019-05-10 PROCEDURE — 81002 URINALYSIS NONAUTO W/O SCOPE: CPT | Mod: PBBFAC,PO | Performed by: NURSE PRACTITIONER

## 2019-05-10 PROCEDURE — 99214 OFFICE O/P EST MOD 30 MIN: CPT | Mod: PBBFAC,PO | Performed by: NURSE PRACTITIONER

## 2019-05-10 PROCEDURE — 99999 PR PBB SHADOW E&M-EST. PATIENT-LVL IV: ICD-10-PCS | Mod: PBBFAC,,, | Performed by: NURSE PRACTITIONER

## 2019-05-10 RX ORDER — LORATADINE 10 MG/1
10 TABLET ORAL DAILY
Refills: 0 | COMMUNITY
Start: 2019-05-10 | End: 2020-06-02

## 2019-05-10 RX ORDER — ALENDRONATE SODIUM 70 MG/1
70 TABLET ORAL
Refills: 3 | COMMUNITY
Start: 2019-02-25 | End: 2019-11-15 | Stop reason: SDUPTHER

## 2019-05-10 RX ORDER — AMOXICILLIN AND CLAVULANATE POTASSIUM 875; 125 MG/1; MG/1
1 TABLET, FILM COATED ORAL 2 TIMES DAILY WITH MEALS
Qty: 14 TABLET | Refills: 0 | Status: SHIPPED | OUTPATIENT
Start: 2019-05-10 | End: 2019-06-04 | Stop reason: ALTCHOICE

## 2019-05-10 NOTE — PROGRESS NOTES
Subjective:       Patient ID: Ama Lang is a 89 y.o. female.    Chief Complaint: Urinary Tract Infection and Sinus Problem    Chief Complaint  Chief Complaint   Patient presents with    Urinary Tract Infection    Sinus Problem       HPI  Ama Lang is a 89 y.o. female with medical diagnoses as listed in the medical history and problem list that presents with complaints of a sinus problem and urinary frequency with concerns for a possible urinary tract infection.  Patient c/o low abdominal pain that started on Sunday, also has some low back pain. Has had some nausea but no vomiting or diarrhea. Has some urinary frequency but also has been drinking more water. POCT urinalysis with trace leukocytes and trace blood, otherwise negative. Patient also c/o sinus congestion, pain, and pressure to cheeks and forehead, aching to the eyes. Patient denies ear pain and sore throat. Patient has been using her astelin and flonase nasal sprays. Patient is regularly treated for chronic atrial fibrillation, hyperlipidemia, hypertension, hypothyroidism, and osteoporosis.  Blood pressure today is 132/82.  BMI is 24.37 and the patient has lost 1 lb since her last visit.  Established patient with last clinic appointment 1/11/2019.        PAST MEDICAL HISTORY:  Past Medical History:   Diagnosis Date    *Atrial fibrillation     Dr. Mullen    Cataract     Diverticulitis     Hyperlipidemia     Hypertension     Hypothyroidism        PAST SURGICAL HISTORY:  Past Surgical History:   Procedure Laterality Date    COLONOSCOPY  12/18/2003    Dr. Santoyo    CYSTOSCOPY  2008    Dr. Nguyen    DILATION AND CURETTAGE OF UTERUS      EYE SURGERY      TUBAL LIGATION         SOCIAL HISTORY:  Social History     Socioeconomic History    Marital status:      Spouse name: Not on file    Number of children: Not on file    Years of education: Not on file    Highest education level: Not on file   Occupational History    Not on  file   Social Needs    Financial resource strain: Not on file    Food insecurity:     Worry: Not on file     Inability: Not on file    Transportation needs:     Medical: Not on file     Non-medical: Not on file   Tobacco Use    Smoking status: Never Smoker    Smokeless tobacco: Never Used   Substance and Sexual Activity    Alcohol use: No    Drug use: No    Sexual activity: Not on file   Lifestyle    Physical activity:     Days per week: Not on file     Minutes per session: Not on file    Stress: Not on file   Relationships    Social connections:     Talks on phone: Not on file     Gets together: Not on file     Attends Restoration service: Not on file     Active member of club or organization: Not on file     Attends meetings of clubs or organizations: Not on file     Relationship status: Not on file   Other Topics Concern    Not on file   Social History Narrative    Not on file       FAMILY HISTORY:  Family History   Problem Relation Age of Onset    Cancer Mother         ovarian    Heart disease Father     Román's disease Father     Macular degeneration Father     Heart disease Brother     Cancer Sister         blood    Cancer Brother     [place]'s disease Daughter     Arthritis Sister         spine    Carpal tunnel syndrome Son     Fibromyalgia Daughter     Román's disease Daughter     Arthritis Daughter         x2    Kidney cancer Neg Hx     Prostate cancer Neg Hx     Urolithiasis Neg Hx        ALLERGIES AND MEDICATIONS: updated and reviewed.  Review of patient's allergies indicates:   Allergen Reactions    Montelukast Other (See Comments)     Depleted sodium     Hydrochlorothiazide Other (See Comments)     Low sodium     Current Outpatient Medications   Medication Sig Dispense Refill    ACETAMINOPHEN (TYLENOL 8 HOUR ORAL) Take 2 capsules by mouth daily as needed.      alendronate (FOSAMAX) 70 MG tablet Take 70 mg by mouth every 7 days.  3    apixaban (ELIQUIS) 2.5 mg  Tab Take 2.5 mg by mouth 2 (two) times daily.       atenolol (TENORMIN) 50 MG tablet Take 1 tablet (50 mg total) by mouth 2 (two) times daily. 180 tablet 0    azelastine (ASTELIN) 137 mcg (0.1 %) nasal spray SPRAY 1 SPRAY INTO EACH NOSTRIL TWICE A DAY 30 mL 1    CALCIUM ORAL Take 1 tablet by mouth once daily.      ergocalciferol (ERGOCALCIFEROL) 50,000 unit Cap Take 1 capsule (50,000 Units total) by mouth once daily. Take 2000 IU of vitamin D daily after 30 days. 30 capsule 0    fluticasone (FLONASE) 50 mcg/actuation nasal spray SNIFF 2 SPRAYS INTO EACH NOSTRIL ONCE DAILY 16 g 11    hydrocortisone 2.5 % cream Apply topically 2 (two) times daily.      ketoconazole (NIZORAL) 2 % shampoo APPLY TO AFFECTED AREA ON THE SKIN 3 TIMES A week  5    levothyroxine (SYNTHROID) 75 MCG tablet Take 1 tablet (75 mcg total) by mouth once daily. 90 tablet 3    lisinopril (PRINIVIL,ZESTRIL) 40 MG tablet TAKE ONE TABLET BY MOUTH EVERY DAY 90 tablet 3    multivitamin (ONE DAILY MULTIVITAMIN) per tablet Take 1 tablet by mouth once daily.      simvastatin (ZOCOR) 20 MG tablet TAKE 1 TABLET (20 MG TOTAL) BY MOUTH EVERY EVENING. 30 tablet 10    triamcinolone acetonide 0.025% (KENALOG) 0.025 % cream 1 application 2 (two) times daily as needed. Apply to affected area  5    VIT C/VIT E/LUTEIN/MIN/OMEGA-3 (OCUVITE ORAL) Take 1 tablet by mouth once daily.      amoxicillin-clavulanate 875-125mg (AUGMENTIN) 875-125 mg per tablet Take 1 tablet by mouth 2 (two) times daily with meals. 14 tablet 0    loratadine (CLARITIN) 10 mg tablet Take 1 tablet (10 mg total) by mouth once daily.  0     No current facility-administered medications for this visit.        I have reviewed the patient's medical, family, and social history in detail and updated the computerized patient record.    Review of Systems   Constitutional: Positive for appetite change and chills. Negative for activity change, fatigue and fever.        Has had some chill,  "appetite is not good.    HENT: Positive for postnasal drip, rhinorrhea, sinus pressure and sinus pain. Negative for congestion, ear pain and sore throat.    Respiratory: Positive for cough. Negative for shortness of breath.         Slight cough, non productive.    Cardiovascular: Positive for palpitations. Negative for chest pain and leg swelling.        Patient feels heart palpitation with atrial fibrillation.   Gastrointestinal: Positive for abdominal pain and nausea. Negative for constipation, diarrhea and vomiting.        Lower abdominal pain with mild nausea, has a lot of gas   Genitourinary: Positive for dysuria and frequency. Negative for difficulty urinating.        Urinary frequency, some pain with urination on Sunday   Musculoskeletal: Positive for back pain.        Low back pain   Skin: Negative for rash and wound.   Neurological: Positive for headaches. Negative for dizziness and numbness.        Sinus headache.   Hematological: Bruises/bleeds easily.        Bruises easily with eliquis.   Psychiatric/Behavioral: Negative for dysphoric mood and sleep disturbance. The patient is not nervous/anxious.          Objective:      Vitals:    05/10/19 1356   BP: 132/82   BP Location: Right arm   Patient Position: Sitting   BP Method: Large (Automatic)   Pulse: 76   Temp: 98.2 °F (36.8 °C)   TempSrc: Oral   Weight: 64.4 kg (142 lb)   Height: 5' 4" (1.626 m)     Physical Exam   Constitutional: She is oriented to person, place, and time. Vital signs are normal. She appears well-developed and well-nourished. No distress.   Blood pressure 132/82   HENT:   Head: Normocephalic and atraumatic.   Right Ear: External ear and ear canal normal. Tympanic membrane is retracted. Decreased hearing is noted.   Left Ear: Tympanic membrane, external ear and ear canal normal. Decreased hearing is noted.   Nose: Nose normal. No mucosal edema. Right sinus exhibits no maxillary sinus tenderness and no frontal sinus tenderness. Left " sinus exhibits no maxillary sinus tenderness and no frontal sinus tenderness.   Mouth/Throat: Oropharynx is clear and moist and mucous membranes are normal.   Right TM with milky effusion noted, decreased hearing to bilateral ears. Hearing aid to left ear   Eyes: Pupils are equal, round, and reactive to light. Conjunctivae and lids are normal. Right eye exhibits no discharge. Left eye exhibits no discharge. Right conjunctiva is not injected. Left conjunctiva is not injected.   Neck: Normal range of motion. Neck supple. Normal carotid pulses present. Carotid bruit is not present. Normal range of motion present. No thyromegaly present.   Cardiovascular: Intact distal pulses and normal pulses. An irregularly irregular rhythm present.   No murmur heard.  Pulses:       Carotid pulses are 2+ on the right side, and 2+ on the left side.       Radial pulses are 2+ on the right side, and 2+ on the left side.   Edema to the left lower leg related to injury   Pulmonary/Chest: Effort normal and breath sounds normal. No respiratory distress. She has no decreased breath sounds. She has no wheezes. She has no rhonchi. She has no rales.   Abdominal: Soft. Normal appearance and bowel sounds are normal. There is no hepatosplenomegaly. There is tenderness in the suprapubic area. No hernia.   Musculoskeletal: Normal range of motion.   Lymphadenopathy:        Head (right side): No submental, no submandibular and no tonsillar adenopathy present.        Head (left side): No submental, no submandibular and no tonsillar adenopathy present.     She has no cervical adenopathy.        Right: No supraclavicular adenopathy present.        Left: No supraclavicular adenopathy present.   Neurological: She is alert and oriented to person, place, and time. She has normal strength.   Skin: Skin is warm, dry and intact. She is not diaphoretic. No pallor.   Psychiatric: She has a normal mood and affect. Her speech is normal and behavior is normal.  Judgment and thought content normal. Cognition and memory are normal.   Nursing note and vitals reviewed.        Assessment:       1. Frequency of urination    2. Acute cystitis with hematuria    3. Allergic sinusitis    4. Chronic atrial fibrillation    5. Essential hypertension    6. Mixed hyperlipidemia    7. Acquired hypothyroidism    8. Age-related osteoporosis without current pathological fracture    9. BMI 24.0-24.9, adult          Plan:       Ama was seen today for urinary tract infection and sinus problem.    Diagnoses and all orders for this visit:    Frequency of urination  -     POCT URINE DIPSTICK WITHOUT MICROSCOPE, positive trace leukocytes and trace blood  - suprapubic tenderness on exam    Acute cystitis with hematuria  -     Urine culture  -     amoxicillin-clavulanate 875-125mg (AUGMENTIN) 875-125 mg per tablet; Take 1 tablet by mouth 2 (two) times daily with meals.  - patient will be notified when culture results are available, antibiotic will be changed if necessary at that time    Allergic sinusitis  -     loratadine (CLARITIN) 10 mg tablet; Take 1 tablet (10 mg total) by mouth once daily.  - continue Astelin and Flonase nasal sprays as prescribed and add Claritin 10 mg daily over-the-counter    Chronic atrial fibrillation   Stable, asymptomatic chronic condition.  Will continue to maximize risk factor reduction and adjust medication as needed.   Continue Eliquis 2.5 mg one tablet 2 times daily  Essential hypertension   Blood pressure controlled 132/82, continue current medication without change  Mixed hyperlipidemia     Lab Results   Component Value Date    CHOL 148 01/25/2018    CHOL 151 11/07/2016    CHOL 147 11/25/2015     Lab Results   Component Value Date    HDL 65 01/25/2018    HDL 58 11/07/2016    HDL 60 11/25/2015     Lab Results   Component Value Date    LDLCALC 70.0 01/25/2018    LDLCALC 73.4 11/07/2016    LDLCALC 73.0 11/25/2015     Lab Results   Component Value Date    TRIG 65  01/25/2018    TRIG 98 11/07/2016    TRIG 70 11/25/2015     Lab Results   Component Value Date    CHOLHDL 43.9 01/25/2018    CHOLHDL 38.4 11/07/2016    CHOLHDL 40.8 11/25/2015    continue Zocor 20 mg daily  Acquired hypothyroidism     Lab Results   Component Value Date    TSH 7.671 (H) 11/27/2018    followed by endocrinology and patient has an appointment with Dr. Wong on 5/17/2019  Age-related osteoporosis without current pathological fracture    Continue Actonel 35 mg once monthly   Dexa bone density scan 7/9/18:     FINDINGS:  The L1 to L4 vertebral bone mineral density is equal to 0.796 g/cm squared with a T score of -2.3.    The left femoral neck bone mineral density is equal to 0.505 g/cm squared with a T score of -3.1.    There is a 17% risk of a major osteoporotic fracture and a 6.8% risk of hip fracture in the next 10 years (FRAX).      Impression       Osteoporosis       BMI 24.0-24.9, adult   BMI today is 24.37 and the patient has lost 1 lb since her last visit on 1/11/2019   Maintain healthy weight with heat diet and exercise/active lifestyle    Follow up if symptoms worsen or fail to improve.      Patient readiness: acceptance and barriers:none    During the course of the visit the patient was educated and counseled about the following:     Hypertension:   Medication: no change.  Dietary sodium restriction.  Regular aerobic exercise.    Goals: Hypertension: Reduce Blood Pressure    Did patient meet goals/outcomes: Yes    The following self management tools provided: declined    Patient Instructions (the written plan) was given to the patient/family.     Time spent with patient: 30 minutes    Barriers to medications present (no )    Adverse reactions to current medications (no)    Over the counter medications reviewed (Yes)

## 2019-05-12 PROBLEM — E78.00 HYPERCHOLESTEROLEMIA: Status: RESOLVED | Noted: 2018-11-27 | Resolved: 2019-05-12

## 2019-05-12 PROBLEM — E83.52 HYPERCALCEMIA: Status: RESOLVED | Noted: 2018-11-27 | Resolved: 2019-05-12

## 2019-05-12 PROBLEM — E03.9 HYPOTHYROIDISM (ACQUIRED): Status: RESOLVED | Noted: 2018-11-27 | Resolved: 2019-05-12

## 2019-05-12 LAB
BACTERIA UR CULT: NORMAL
BACTERIA UR CULT: NORMAL

## 2019-05-13 LAB
BILIRUB SERPL-MCNC: ABNORMAL MG/DL
BLOOD URINE, POC: ABNORMAL
COLOR, POC UA: YELLOW
GLUCOSE UR QL STRIP: ABNORMAL
KETONES UR QL STRIP: ABNORMAL
LEUKOCYTE ESTERASE URINE, POC: ABNORMAL
NITRITE, POC UA: ABNORMAL
PH, POC UA: 7
PROTEIN, POC: ABNORMAL
SPECIFIC GRAVITY, POC UA: 1.01
UROBILINOGEN, POC UA: ABNORMAL

## 2019-05-17 ENCOUNTER — LAB VISIT (OUTPATIENT)
Dept: LAB | Facility: HOSPITAL | Age: 84
End: 2019-05-17
Attending: INTERNAL MEDICINE
Payer: MEDICARE

## 2019-05-17 ENCOUNTER — OFFICE VISIT (OUTPATIENT)
Dept: ENDOCRINOLOGY | Facility: CLINIC | Age: 84
End: 2019-05-17
Payer: MEDICARE

## 2019-05-17 VITALS
SYSTOLIC BLOOD PRESSURE: 162 MMHG | HEART RATE: 78 BPM | BODY MASS INDEX: 24.4 KG/M2 | DIASTOLIC BLOOD PRESSURE: 85 MMHG | HEIGHT: 64 IN | WEIGHT: 142.94 LBS | RESPIRATION RATE: 16 BRPM

## 2019-05-17 DIAGNOSIS — E55.9 HYPOVITAMINOSIS D: ICD-10-CM

## 2019-05-17 DIAGNOSIS — E04.9 GOITER: ICD-10-CM

## 2019-05-17 DIAGNOSIS — E04.1 NODULAR THYROID DISEASE: ICD-10-CM

## 2019-05-17 DIAGNOSIS — E03.9 HYPOTHYROIDISM (ACQUIRED): ICD-10-CM

## 2019-05-17 DIAGNOSIS — E78.49 OTHER HYPERLIPIDEMIA: ICD-10-CM

## 2019-05-17 DIAGNOSIS — E83.52 HYPERCALCEMIA: ICD-10-CM

## 2019-05-17 DIAGNOSIS — E21.0 PRIMARY HYPERPARATHYROIDISM: ICD-10-CM

## 2019-05-17 DIAGNOSIS — Z78.0 POSTMENOPAUSAL: ICD-10-CM

## 2019-05-17 DIAGNOSIS — R73.9 HYPERGLYCEMIA: ICD-10-CM

## 2019-05-17 DIAGNOSIS — E78.00 HYPERCHOLESTEROLEMIA: ICD-10-CM

## 2019-05-17 DIAGNOSIS — M81.8 OTHER OSTEOPOROSIS WITHOUT CURRENT PATHOLOGICAL FRACTURE: ICD-10-CM

## 2019-05-17 DIAGNOSIS — I10 ESSENTIAL HYPERTENSION: ICD-10-CM

## 2019-05-17 DIAGNOSIS — E03.9 HYPOTHYROIDISM (ACQUIRED): Primary | ICD-10-CM

## 2019-05-17 DIAGNOSIS — I48.20 CHRONIC ATRIAL FIBRILLATION: ICD-10-CM

## 2019-05-17 LAB
25(OH)D3+25(OH)D2 SERPL-MCNC: 26 NG/ML (ref 30–96)
ALBUMIN SERPL BCP-MCNC: 3.9 G/DL (ref 3.5–5.2)
ALP SERPL-CCNC: 84 U/L (ref 55–135)
ALT SERPL W/O P-5'-P-CCNC: 16 U/L (ref 10–44)
ANION GAP SERPL CALC-SCNC: 6 MMOL/L (ref 8–16)
AST SERPL-CCNC: 29 U/L (ref 10–40)
BASOPHILS # BLD AUTO: 0.07 K/UL (ref 0–0.2)
BASOPHILS NFR BLD: 1.5 % (ref 0–1.9)
BILIRUB SERPL-MCNC: 0.8 MG/DL (ref 0.1–1)
BUN SERPL-MCNC: 9 MG/DL (ref 10–30)
CA-I BLDV-SCNC: 1.37 MMOL/L (ref 1.06–1.42)
CALCIUM SERPL-MCNC: 10.4 MG/DL (ref 8.7–10.5)
CHLORIDE SERPL-SCNC: 105 MMOL/L (ref 95–110)
CHOLEST SERPL-MCNC: 121 MG/DL (ref 120–199)
CHOLEST/HDLC SERPL: 2.2 {RATIO} (ref 2–5)
CO2 SERPL-SCNC: 28 MMOL/L (ref 23–29)
CREAT SERPL-MCNC: 0.8 MG/DL (ref 0.5–1.4)
DIFFERENTIAL METHOD: ABNORMAL
EOSINOPHIL # BLD AUTO: 0.1 K/UL (ref 0–0.5)
EOSINOPHIL NFR BLD: 2.3 % (ref 0–8)
ERYTHROCYTE [DISTWIDTH] IN BLOOD BY AUTOMATED COUNT: 13.9 % (ref 11.5–14.5)
EST. GFR  (AFRICAN AMERICAN): >60 ML/MIN/1.73 M^2
EST. GFR  (NON AFRICAN AMERICAN): >60 ML/MIN/1.73 M^2
ESTIMATED AVG GLUCOSE: 111 MG/DL (ref 68–131)
GLUCOSE SERPL-MCNC: 98 MG/DL (ref 70–110)
HBA1C MFR BLD HPLC: 5.5 % (ref 4–5.6)
HCT VFR BLD AUTO: 35.6 % (ref 37–48.5)
HDLC SERPL-MCNC: 55 MG/DL (ref 40–75)
HDLC SERPL: 45.5 % (ref 20–50)
HGB BLD-MCNC: 11.5 G/DL (ref 12–16)
IMM GRANULOCYTES # BLD AUTO: 0.01 K/UL (ref 0–0.04)
IMM GRANULOCYTES NFR BLD AUTO: 0.2 % (ref 0–0.5)
LDLC SERPL CALC-MCNC: 55.2 MG/DL (ref 63–159)
LYMPHOCYTES # BLD AUTO: 1.4 K/UL (ref 1–4.8)
LYMPHOCYTES NFR BLD: 29.9 % (ref 18–48)
MCH RBC QN AUTO: 33.1 PG (ref 27–31)
MCHC RBC AUTO-ENTMCNC: 32.3 G/DL (ref 32–36)
MCV RBC AUTO: 103 FL (ref 82–98)
MONOCYTES # BLD AUTO: 0.5 K/UL (ref 0.3–1)
MONOCYTES NFR BLD: 11.1 % (ref 4–15)
NEUTROPHILS # BLD AUTO: 2.6 K/UL (ref 1.8–7.7)
NEUTROPHILS NFR BLD: 55 % (ref 38–73)
NONHDLC SERPL-MCNC: 66 MG/DL
NRBC BLD-RTO: 0 /100 WBC
PLATELET # BLD AUTO: 100 K/UL (ref 150–350)
PMV BLD AUTO: 14.6 FL (ref 9.2–12.9)
POTASSIUM SERPL-SCNC: 4.3 MMOL/L (ref 3.5–5.1)
PROT SERPL-MCNC: 6.9 G/DL (ref 6–8.4)
PTH-INTACT SERPL-MCNC: 202 PG/ML (ref 9–77)
RBC # BLD AUTO: 3.47 M/UL (ref 4–5.4)
SODIUM SERPL-SCNC: 139 MMOL/L (ref 136–145)
T3 SERPL-MCNC: 90 NG/DL (ref 60–180)
T4 FREE SERPL-MCNC: 1.24 NG/DL (ref 0.71–1.51)
TRIGL SERPL-MCNC: 54 MG/DL (ref 30–150)
TSH SERPL DL<=0.005 MIU/L-ACNC: 0.45 UIU/ML (ref 0.4–4)
URATE SERPL-MCNC: 4.6 MG/DL (ref 2.4–5.7)
WBC # BLD AUTO: 4.79 K/UL (ref 3.9–12.7)

## 2019-05-17 PROCEDURE — 85025 COMPLETE CBC W/AUTO DIFF WBC: CPT

## 2019-05-17 PROCEDURE — 99214 OFFICE O/P EST MOD 30 MIN: CPT | Mod: PBBFAC,PO | Performed by: INTERNAL MEDICINE

## 2019-05-17 PROCEDURE — 82330 ASSAY OF CALCIUM: CPT

## 2019-05-17 PROCEDURE — 99999 PR PBB SHADOW E&M-EST. PATIENT-LVL IV: CPT | Mod: PBBFAC,,, | Performed by: INTERNAL MEDICINE

## 2019-05-17 PROCEDURE — 84443 ASSAY THYROID STIM HORMONE: CPT

## 2019-05-17 PROCEDURE — 84439 ASSAY OF FREE THYROXINE: CPT

## 2019-05-17 PROCEDURE — 99214 OFFICE O/P EST MOD 30 MIN: CPT | Mod: S$PBB,,, | Performed by: INTERNAL MEDICINE

## 2019-05-17 PROCEDURE — 86316 IMMUNOASSAY TUMOR OTHER: CPT

## 2019-05-17 PROCEDURE — 83036 HEMOGLOBIN GLYCOSYLATED A1C: CPT

## 2019-05-17 PROCEDURE — 84480 ASSAY TRIIODOTHYRONINE (T3): CPT

## 2019-05-17 PROCEDURE — 80061 LIPID PANEL: CPT

## 2019-05-17 PROCEDURE — 99999 PR PBB SHADOW E&M-EST. PATIENT-LVL IV: ICD-10-PCS | Mod: PBBFAC,,, | Performed by: INTERNAL MEDICINE

## 2019-05-17 PROCEDURE — 84550 ASSAY OF BLOOD/URIC ACID: CPT

## 2019-05-17 PROCEDURE — 80053 COMPREHEN METABOLIC PANEL: CPT

## 2019-05-17 PROCEDURE — 99214 PR OFFICE/OUTPT VISIT, EST, LEVL IV, 30-39 MIN: ICD-10-PCS | Mod: S$PBB,,, | Performed by: INTERNAL MEDICINE

## 2019-05-17 PROCEDURE — 83970 ASSAY OF PARATHORMONE: CPT

## 2019-05-17 PROCEDURE — 82306 VITAMIN D 25 HYDROXY: CPT

## 2019-05-17 PROCEDURE — 36415 COLL VENOUS BLD VENIPUNCTURE: CPT | Mod: PO

## 2019-05-17 NOTE — PROGRESS NOTES
Subjective:      Patient ID: Ama Lang is a 89 y.o. female.    Chief Complaint:  Hyperparathyroidism    89 yr old postmenopausal lady seen in [facility] today on account of hypercalcemia.        History of Present Illness    Patient is a 89 yr old postmenopausal lady seen for initial visit by me on account of presumed hypercalcemia.   The patient has been previously seen on this account earlier this year (07/18) my Chelita Eli PA-C.  The prior noted hypercalcemia was noted in the setting of diuretic use; HCTZ.  Her background comorbidities are as detailed below;     1. Hypercalcemia      2. Chronic atrial fibrillation      3. Hypovitaminosis D      4. Hypercholesterolemia      5. Postmenopausal      6. Hypothyroidism (acquired)      7. Osteoporosis, unspecified osteoporosis type, unspecified pathological fracture presence         Her most recent DEXA from 07/18 showed osteoporosis for which she is actonel therapy and her next DEXA should be for ~ 07/20.  She also had a screening thyroid USS done then which revealed thyroid nodular disease but none of the nodules were at the threshold to warrant biopsy. Her next surviellance thyroid USS should be for ~06/19.  The subsequent evaluation done at her initial visit to the endocrine showed no hypercalcemia of note.  Patient has not had any recent falls.   She has an extensive family history of [place]'s chorea that affected multiple family memebers including her , one of her daughters, one son and several uncles and aunts.  Patient had a fall just last night but no injuries of note.           Review of Systems   Constitutional: Negative for diaphoresis, fatigue and unexpected weight change.   HENT: Positive for hearing loss (chronic and stable). Negative for facial swelling, trouble swallowing and voice change.    Eyes: Negative for photophobia and visual disturbance.   Respiratory: Negative for cough and shortness of breath.    Cardiovascular: Positive for  "palpitations (chronic and intermittent). Negative for chest pain and leg swelling.   Gastrointestinal: Negative for abdominal distention, abdominal pain and constipation.   Endocrine: Negative for polydipsia and polyuria.   Genitourinary: Negative for dysuria, frequency and menstrual problem.   Musculoskeletal: Positive for arthralgias (mainly in hands and wrist). Negative for gait problem.   Skin: Negative for color change, pallor and rash.   Neurological: Positive for dizziness (occasional; not posture related) and numbness (mainly in right hand and worse after extended period of use like doing house work, cleaning and washing dishes.). Negative for tremors and headaches.   Hematological: Bruises/bleeds easily (on eliquis).   Psychiatric/Behavioral: Negative for confusion, dysphoric mood and sleep disturbance.       Objective: BP (!) 162/85 (BP Location: Right arm, Patient Position: Sitting, BP Method: Medium (Automatic))   Pulse 78   Resp 16   Ht 5' 4" (1.626 m)   Wt 64.8 kg (142 lb 15.5 oz)   BMI 24.54 kg/m²  Body surface area is 1.71 meters squared.         Physical Exam   Constitutional: She is oriented to person, place, and time. She appears well-developed and well-nourished. No distress.   Pleasant elderly lady. Clinically comfortable. Not pale, anicteric, afebrile, well hydrated but appears a little hard of  Hearing.   HENT:   Head: Normocephalic and atraumatic.   Mouth/Throat: No oropharyngeal exudate.   Eyes: Pupils are equal, round, and reactive to light. Conjunctivae and EOM are normal. No scleral icterus.   Has bilateral arcus senilis   Neck: Normal range of motion. Neck supple. No JVD present. No tracheal deviation present. No thyromegaly present.   No neck bruits   Cardiovascular: Normal rate. Exam reveals no friction rub.   No murmur heard.  Has irregular pulse.   Pulmonary/Chest: Effort normal and breath sounds normal. No stridor. No respiratory distress.   Abdominal: Soft. She exhibits no " distension. There is no tenderness.   Musculoskeletal: She exhibits no edema or tenderness.   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  Has +ve phalen's and Tinel's signs in the right hand.  No major gaite anomalies.   Lymphadenopathy:     She has no cervical adenopathy.   Neurological: She is alert and oriented to person, place, and time. No cranial nerve deficit.   Skin: Skin is warm and dry. No rash noted. She is not diaphoretic. No erythema. No pallor.   Age appropriate cutaneous atrophy with some old and fresh ecchymoses.   Psychiatric: She has a normal mood and affect. Her behavior is normal. Judgment and thought content normal.   Vitals reviewed.      Lab Review:     Results for ELIAS CAGLE (MRN [id number]) as of 5/17/2019 11:45   Ref. Range 11/27/2018 10:00 5/10/2019 14:59 5/10/2019 14:59 5/13/2019 07:14   Sodium Latest Ref Range: 136 - 145 mmol/L 138      Potassium Latest Ref Range: 3.5 - 5.1 mmol/L 4.2      Chloride Latest Ref Range: 95 - 110 mmol/L 103      CO2 Latest Ref Range: 23 - 29 mmol/L 27      Anion Gap Latest Ref Range: 8 - 16 mmol/L 8      BUN, Bld Latest Ref Range: 8 - 23 mg/dL 14      Creatinine Latest Ref Range: 0.5 - 1.4 mg/dL 0.9      eGFR if non African American Latest Ref Range: >60 mL/min/1.73 m^2 56.5 (A)      eGFR if African American Latest Ref Range: >60 mL/min/1.73 m^2 >60.0      Glucose Latest Ref Range: 70 - 110 mg/dL 102      Calcium Latest Ref Range: 8.7 - 10.5 mg/dL 10.6 (H)      Calcium, Ion Latest Ref Range: 1.06 - 1.42 mmol/L 1.38      Phosphorus Latest Ref Range: 2.7 - 4.5 mg/dL 2.9      Magnesium Latest Ref Range: 1.6 - 2.6 mg/dL 2.2      Alkaline Phosphatase Latest Ref Range: 55 - 135 U/L 82      PROTEIN TOTAL Latest Ref Range: 6.0 - 8.4 g/dL 7.2      Albumin Latest Ref Range: 3.5 - 5.2 g/dL 4.0      Uric Acid Latest Ref Range: 2.4 - 5.7 mg/dL 5.3      BILIRUBIN TOTAL Latest Ref Range: 0.1 - 1.0 mg/dL 0.9      AST Latest Ref Range: 10 - 40 U/L 25      ALT  Latest Ref Range: 10 - 44 U/L 15      Iodine, Serum Latest Ref Range: 40 - 92 ng/mL 55      Vit D, 1,25-Dihydroxy Latest Ref Range: 20 - 79 pg/mL 89 (H)      Vit D, 25-Hydroxy Latest Ref Range: 30 - 96 ng/mL 32      ALDOSTERONE Latest Units: ng/dL 5.3      TSH Latest Ref Range: 0.400 - 4.000 uIU/mL 7.671 (H)      T3, Total Latest Ref Range: 60 - 180 ng/dL 76      Free T4 Latest Ref Range: 0.71 - 1.51 ng/dL 0.92      Thyroglobulin Interpretation Unknown SEE BELOW      Thyroglobulin Antibody Screen Latest Ref Range: <4.0 IU/mL <1.8      Thyroglobulin, Tumor Marker Latest Units: ng/mL 13 (H)      PTH Latest Ref Range: 9.0 - 77.0 pg/mL 180.0 (H)      Calcitonin Latest Ref Range: <=7.6 pg/mL <5.0      Chromogranin A Latest Ref Range: 0 - 95 ng/mL 158 (H)      Glucose, UA Unknown    Trace   Ketones, UA Unknown    Neg   RBC, UA Unknown    Trace   WBC, UA Unknown    Trace   CULTURE, URINE Unknown  Rpt     Urine Culture, Routine Unknown  clinically necessary. Multiple organisms isolated. None in predominance.  Repeat if    Bilirubin Unknown    Neg   Protein Unknown    Neg   Nitrite, UA Unknown    Neg   Urobilinogen, UA Unknown    Nor   Spec Grav UA Unknown    1.010   Color, UA Unknown    Yellow   pH, UA Unknown    7       Assessment:     1. Hypothyroidism (acquired)  Thyroglobulin    T4, free    T3    TSH    Lipid panel    CBC auto differential    Comprehensive metabolic panel    Uric acid   2. Nodular thyroid disease  US Soft Tissue Head Neck Thyroid    Thyroglobulin    Iodine, Serum    Chromogranin A    Calcitonin   3. Primary hyperparathyroidism  US Soft Tissue Head Neck Thyroid    Calcium, ionized   4. Hypercalcemia  Calcium, ionized   5. Other osteoporosis without current pathological fracture     6. Hypovitaminosis D  Vitamin D    PTH, intact   7. Postmenopausal     8. Essential hypertension  Lipid panel    Comprehensive metabolic panel    Microalbumin/creatinine urine ratio    Urinalysis   9. Chronic atrial  fibrillation     10. Other hyperlipidemia  Lipid panel    Comprehensive metabolic panel   11. Hypercholesterolemia  Lipid panel    Comprehensive metabolic panel   12. Goiter  US Soft Tissue Head Neck Thyroid   13. Hyperglycemia  Hemoglobin A1c        Regarding hypercalcemia; this appears due to mild hyperparathyroidism. To continue serial monitoring of calcium and other mineral panel labs. Being managed conservatively for now.  Regarding hypovitaminosis D; to continue vitamin d repletion therapy as before.  Regarding osteoporosis; patient insurance plan did not cover actonel so will instead switch her to fosamax 70mg Q weekly for a 5 yr cycle. FFup DEXA; 07/20.   Regarding thyroid nodular disease; stable clinically. To obtain baseline labs as detailed above and for ffup thyroid USS ~ 06/19.  Regarding essential hypertension; has mild systolic hypertension but not symptomatic. For now no change to her present antihypertensive regimen but to continue serial tracking of ambulatory BP trends.  Regarding hyperlipidemia with hypercholesterolemia; to continue antilipidemic therapy as before.   Regarding chronic afibb; ongoing therapy and ffup as per cardiology service including beta blockade and chronic anticoagulation with Eliquis.  Regarding postmenopausal status; stable. To continue premarin cream for vaginal dryness and atrophy as before.  Regarding right carpal tunnel syndrome; advised to obtain wrist splint for symptom management.        Plan:       FFup in ~ 6mths.

## 2019-05-18 PROBLEM — R80.9 PROTEINURIA: Status: ACTIVE | Noted: 2019-05-18

## 2019-05-18 RX ORDER — AMLODIPINE BESYLATE 2.5 MG/1
2.5 TABLET ORAL DAILY
Qty: 90 TABLET | Refills: 3 | Status: SHIPPED | OUTPATIENT
Start: 2019-05-18 | End: 2019-08-16

## 2019-05-20 LAB
CGA SERPL-MCNC: 128 NG/ML (ref 0–95)
IODINE SERPL-MCNC: 71 NG/ML (ref 40–92)
THRYOGLOBULIN INTERPRETATION: ABNORMAL
THYROGLOB AB SERPL-ACNC: <1.8 IU/ML
THYROGLOB SERPL-MCNC: 7.5 NG/ML

## 2019-05-21 LAB — CALCIT SERPL-MCNC: <5 PG/ML

## 2019-06-04 ENCOUNTER — OFFICE VISIT (OUTPATIENT)
Dept: FAMILY MEDICINE | Facility: CLINIC | Age: 84
End: 2019-06-04
Payer: MEDICARE

## 2019-06-04 VITALS
WEIGHT: 136 LBS | BODY MASS INDEX: 23.22 KG/M2 | DIASTOLIC BLOOD PRESSURE: 86 MMHG | SYSTOLIC BLOOD PRESSURE: 138 MMHG | TEMPERATURE: 99 F | HEART RATE: 72 BPM | HEIGHT: 64 IN

## 2019-06-04 DIAGNOSIS — K30 ACID INDIGESTION: ICD-10-CM

## 2019-06-04 DIAGNOSIS — R35.0 FREQUENCY OF URINATION: Primary | ICD-10-CM

## 2019-06-04 DIAGNOSIS — N30.01 ACUTE CYSTITIS WITH HEMATURIA: ICD-10-CM

## 2019-06-04 DIAGNOSIS — R19.7 DIARRHEA, UNSPECIFIED TYPE: ICD-10-CM

## 2019-06-04 DIAGNOSIS — I10 ESSENTIAL HYPERTENSION: ICD-10-CM

## 2019-06-04 DIAGNOSIS — K29.60 OTHER GASTRITIS WITHOUT HEMORRHAGE, UNSPECIFIED CHRONICITY: ICD-10-CM

## 2019-06-04 DIAGNOSIS — I48.20 CHRONIC ATRIAL FIBRILLATION: ICD-10-CM

## 2019-06-04 DIAGNOSIS — R30.9 PAINFUL URINATION: ICD-10-CM

## 2019-06-04 PROCEDURE — 99214 OFFICE O/P EST MOD 30 MIN: CPT | Mod: PBBFAC,PO | Performed by: NURSE PRACTITIONER

## 2019-06-04 PROCEDURE — 87077 CULTURE AEROBIC IDENTIFY: CPT

## 2019-06-04 PROCEDURE — 99214 PR OFFICE/OUTPT VISIT, EST, LEVL IV, 30-39 MIN: ICD-10-PCS | Mod: S$PBB,,, | Performed by: NURSE PRACTITIONER

## 2019-06-04 PROCEDURE — 87086 URINE CULTURE/COLONY COUNT: CPT

## 2019-06-04 PROCEDURE — 87088 URINE BACTERIA CULTURE: CPT

## 2019-06-04 PROCEDURE — 87186 SC STD MICRODIL/AGAR DIL: CPT

## 2019-06-04 PROCEDURE — 99999 PR PBB SHADOW E&M-EST. PATIENT-LVL IV: ICD-10-PCS | Mod: PBBFAC,,, | Performed by: NURSE PRACTITIONER

## 2019-06-04 PROCEDURE — 99999 PR PBB SHADOW E&M-EST. PATIENT-LVL IV: CPT | Mod: PBBFAC,,, | Performed by: NURSE PRACTITIONER

## 2019-06-04 PROCEDURE — 81002 URINALYSIS NONAUTO W/O SCOPE: CPT | Mod: PBBFAC,PO | Performed by: NURSE PRACTITIONER

## 2019-06-04 PROCEDURE — 99214 OFFICE O/P EST MOD 30 MIN: CPT | Mod: S$PBB,,, | Performed by: NURSE PRACTITIONER

## 2019-06-04 RX ORDER — PANTOPRAZOLE SODIUM 40 MG/1
40 TABLET, DELAYED RELEASE ORAL DAILY
Qty: 30 TABLET | Refills: 1 | Status: SHIPPED | OUTPATIENT
Start: 2019-06-04 | End: 2020-06-02 | Stop reason: ALTCHOICE

## 2019-06-04 RX ORDER — NITROFURANTOIN 25; 75 MG/1; MG/1
100 CAPSULE ORAL 2 TIMES DAILY
Qty: 14 CAPSULE | Refills: 0 | Status: SHIPPED | OUTPATIENT
Start: 2019-06-04 | End: 2019-06-11

## 2019-06-04 NOTE — PROGRESS NOTES
Subjective:       Patient ID: Ama Lang is a 89 y.o. female.    Chief Complaint: Urinary Tract Infection    Chief Complaint   Patient presents with    Urinary Tract Infection       HPI  Ama Lang is a 89 y.o. female with medical diagnoses as listed in the medical history and problem list that presents with c/o painful urination and urinary frequency.  Urinalysis is positive for leukocytes, trace nitrates, and trace blood.  Patient was recently seen with similar complaints on 5/10/2019 and culture grew multiple organisms none in predominance.  Patient was treated at that time with Augmentin.  Patient is regularly followed for hypertension, hyperlipidemia, chronic atrial fibrillation, and hypothyroidism.  She is taking Eliquis 2.5 mg 2 times daily.  Blood pressure today is 138/86.  BMI is 23.34 and the patient has lost 6 lb since her last visit. Established patient with last clinic appointment 5/10/2019.        PAST MEDICAL HISTORY:  Past Medical History:   Diagnosis Date    *Atrial fibrillation     Dr. Mullen    Cataract     Diverticulitis     Hyperlipidemia     Hypertension     Hypothyroidism        PAST SURGICAL HISTORY:  Past Surgical History:   Procedure Laterality Date    COLONOSCOPY  12/18/2003    Dr. Santoyo    CYSTOSCOPY  2008    Dr. Nguyen    DILATION AND CURETTAGE OF UTERUS      EYE SURGERY      TUBAL LIGATION         SOCIAL HISTORY:  Social History     Socioeconomic History    Marital status:      Spouse name: Not on file    Number of children: Not on file    Years of education: Not on file    Highest education level: Not on file   Occupational History    Not on file   Social Needs    Financial resource strain: Not on file    Food insecurity:     Worry: Not on file     Inability: Not on file    Transportation needs:     Medical: Not on file     Non-medical: Not on file   Tobacco Use    Smoking status: Never Smoker    Smokeless tobacco: Never Used   Substance and  Sexual Activity    Alcohol use: No    Drug use: No    Sexual activity: Not on file   Lifestyle    Physical activity:     Days per week: Not on file     Minutes per session: Not on file    Stress: Not on file   Relationships    Social connections:     Talks on phone: Not on file     Gets together: Not on file     Attends Rest[employer] service: Not on file     Active member of club or organization: Not on file     Attends meetings of clubs or organizations: Not on file     Relationship status: Not on file   Other Topics Concern    Not on file   Social History Narrative    Not on file       FAMILY HISTORY:  Family History   Problem Relation Age of Onset    Cancer Mother         ovarian    Heart disease Father     Felton's disease Father     Macular degeneration Father     Heart disease Brother     Cancer Sister         blood    Cancer Brother     Felton's disease Daughter     Arthritis Sister         spine    Carpal tunnel syndrome Son     Fibromyalgia Daughter     Román's disease Daughter     Arthritis Daughter         x2    Kidney cancer Neg Hx     Prostate cancer Neg Hx     Urolithiasis Neg Hx        ALLERGIES AND MEDICATIONS: updated and reviewed.  Review of patient's allergies indicates:   Allergen Reactions    Montelukast Other (See Comments)     Depleted sodium     Hydrochlorothiazide Other (See Comments)     Low sodium     Current Outpatient Medications   Medication Sig Dispense Refill    ACETAMINOPHEN (TYLENOL 8 HOUR ORAL) Take 2 capsules by mouth daily as needed.      alendronate (FOSAMAX) 70 MG tablet Take 70 mg by mouth every 7 days.  3    amLODIPine (NORVASC) 2.5 MG tablet Take 1 tablet (2.5 mg total) by mouth once daily. 90 tablet 3    apixaban (ELIQUIS) 2.5 mg Tab Take 2.5 mg by mouth 2 (two) times daily.       atenolol (TENORMIN) 50 MG tablet Take 1 tablet (50 mg total) by mouth 2 (two) times daily. 180 tablet 0    azelastine (ASTELIN) 137 mcg (0.1 %) nasal spray  SPRAY 1 SPRAY INTO EACH NOSTRIL TWICE A DAY 30 mL 1    ergocalciferol (ERGOCALCIFEROL) 50,000 unit Cap Take 1 capsule (50,000 Units total) by mouth once daily. Take 2000 IU of vitamin D daily after 30 days. 30 capsule 0    fluticasone (FLONASE) 50 mcg/actuation nasal spray SNIFF 2 SPRAYS INTO EACH NOSTRIL ONCE DAILY 16 g 11    hydrocortisone 2.5 % cream Apply topically 2 (two) times daily.      ketoconazole (NIZORAL) 2 % shampoo APPLY TO AFFECTED AREA ON THE SKIN 3 TIMES A week  5    lisinopril (PRINIVIL,ZESTRIL) 40 MG tablet TAKE ONE TABLET BY MOUTH EVERY DAY 90 tablet 3    loratadine (CLARITIN) 10 mg tablet Take 1 tablet (10 mg total) by mouth once daily.  0    multivitamin (ONE DAILY MULTIVITAMIN) per tablet Take 1 tablet by mouth once daily.      simvastatin (ZOCOR) 20 MG tablet TAKE 1 TABLET (20 MG TOTAL) BY MOUTH EVERY EVENING. 30 tablet 10    triamcinolone acetonide 0.025% (KENALOG) 0.025 % cream 1 application 2 (two) times daily as needed. Apply to affected area  5    VIT C/VIT E/LUTEIN/MIN/OMEGA-3 (OCUVITE ORAL) Take 1 tablet by mouth once daily.      levothyroxine (SYNTHROID) 75 MCG tablet Take 1 tablet (75 mcg total) by mouth once daily. 90 tablet 3    nitrofurantoin, macrocrystal-monohydrate, (MACROBID) 100 MG capsule Take 1 capsule (100 mg total) by mouth 2 (two) times daily. Antibiotic for urinary tract infection for 7 days 14 capsule 0    pantoprazole (PROTONIX) 40 MG tablet Take 1 tablet (40 mg total) by mouth once daily. For acid indigestion in the morning 30 tablet 1     No current facility-administered medications for this visit.        I have reviewed the patient's medical, family, and social history in detail and updated the computerized patient record.    Review of Systems   Constitutional: Negative for activity change, appetite change, chills, fatigue and fever.   HENT: Positive for congestion and rhinorrhea. Negative for ear pain, postnasal drip, sinus pressure, sinus pain and  "sore throat.         Stuffy and runny nose with sinus allergies.    Eyes: Negative for visual disturbance.        Wears glasses to read   Respiratory: Negative for cough and shortness of breath.    Cardiovascular: Positive for palpitations. Negative for chest pain and leg swelling.        Does feel some palpitations with atrial fibrillation.   Gastrointestinal: Positive for abdominal pain and diarrhea. Negative for constipation, nausea and vomiting.        Has been having some abdominal pain.  States feels like indigestion to stomach, maybe burning, seems to improve with food. Has been taking pepto bismol with some relief. Recent bout of loose bowels. Stool dark with pepto bismol.    Genitourinary: Positive for dysuria and frequency. Negative for difficulty urinating.        Patient has a leaking bladder and wears a pad, this is a recent development in the past 2 months. Pain with urination and urinary frequency.   Musculoskeletal: Negative for arthralgias and myalgias.   Skin: Negative for rash and wound.   Neurological: Negative for dizziness, numbness and headaches.   Hematological: Does not bruise/bleed easily.   Psychiatric/Behavioral: Negative for dysphoric mood and sleep disturbance. The patient is nervous/anxious.         Patient has a lot of stress in her life. She is taking care of 2 of her daughters.          Objective:      Vitals:    06/04/19 1102   BP: 138/86   BP Location: Right arm   Patient Position: Sitting   BP Method: Large (Automatic)   Pulse: 72   Temp: 98.8 °F (37.1 °C)   TempSrc: Oral   Weight: 61.7 kg (136 lb)   Height: 5' 4" (1.626 m)     Physical Exam   Constitutional: She is oriented to person, place, and time. Vital signs are normal. She appears well-developed and well-nourished. No distress.   Blood pressure 138/86   HENT:   Head: Normocephalic and atraumatic.   Right Ear: External ear normal. Decreased hearing is noted.   Left Ear: External ear normal. Decreased hearing is noted. "   Nose: Nose normal.   Mouth/Throat: Oropharynx is clear and moist and mucous membranes are normal.   Hearing aid to left ear   Eyes: Pupils are equal, round, and reactive to light. Conjunctivae and lids are normal. Right eye exhibits no discharge. Left eye exhibits no discharge. Right conjunctiva is not injected. Left conjunctiva is not injected.   Neck: Normal range of motion. Neck supple. Normal carotid pulses present. Carotid bruit is not present. Normal range of motion present.   Cardiovascular: Intact distal pulses and normal pulses. An irregularly irregular rhythm present.   No murmur heard.  Pulses:       Carotid pulses are 2+ on the right side, and 2+ on the left side.       Radial pulses are 2+ on the right side, and 2+ on the left side.        Posterior tibial pulses are 2+ on the right side, and 2+ on the left side.   Trace edema to left ankle area.   Pulmonary/Chest: Effort normal and breath sounds normal. No respiratory distress. She has no decreased breath sounds. She has no wheezes. She has no rhonchi. She has no rales.   Abdominal: Soft. Normal appearance and bowel sounds are normal. There is no hepatosplenomegaly. There is tenderness in the suprapubic area and left upper quadrant. There is no CVA tenderness. No hernia.   Some abdominal pain with palpation to left upper quadrant and suprapubic area. No CVA tenderness bilaterally.   Musculoskeletal: Normal range of motion.   Lymphadenopathy:        Head (right side): No submental, no submandibular and no tonsillar adenopathy present.        Head (left side): No submental, no submandibular and no tonsillar adenopathy present.     She has no cervical adenopathy.        Right: No supraclavicular adenopathy present.        Left: No supraclavicular adenopathy present.   Neurological: She is alert and oriented to person, place, and time. She has normal strength. Gait normal.   Skin: Skin is warm, dry and intact. No rash noted. She is not diaphoretic. No  erythema. No pallor.   Psychiatric: She has a normal mood and affect. Her speech is normal and behavior is normal. Judgment and thought content normal. Her mood appears not anxious. Cognition and memory are normal. She does not exhibit a depressed mood.   Nursing note and vitals reviewed.        Assessment:       1. Frequency of urination    2. Painful urination    3. Acute cystitis with hematuria    4. Other gastritis without hemorrhage, unspecified chronicity    5. Acid indigestion    6. Diarrhea, unspecified type    7. Essential hypertension    8. Chronic atrial fibrillation    9. BMI 23.0-23.9, adult          Plan:       Ama was seen today for urinary tract infection.    Diagnoses and all orders for this visit:    Frequency of urination  -     POCT URINE DIPSTICK WITHOUT MICROSCOPE  - POCT urinalysis with 2+ leukocytes, trace nitrites, trace blood    Painful urination  -     POCT URINE DIPSTICK WITHOUT MICROSCOPE  - POCT urinalysis with 2+ leukocytes, trace nitrites, trace blood    Acute cystitis with hematuria  -     Urine culture  -     nitrofurantoin, macrocrystal-monohydrate, (MACROBID) 100 MG capsule; Take 1 capsule (100 mg total) by mouth 2 (two) times daily. Antibiotic for urinary tract infection for 7 days   - encouraged to maintain hydration, drink plenty of fluids    Other gastritis without hemorrhage, unspecified chronicity  -     pantoprazole (PROTONIX) 40 MG tablet; Take 1 tablet (40 mg total) by mouth once daily. For acid indigestion in the morning  - patient complains of abdominal pain to the left upper quadrant that is often relieved by eating, will try short course of Protonix to see if she gets relief of this pain, patient will return to clinic in six weeks for recheck and weight check    Acid indigestion  -     pantoprazole (PROTONIX) 40 MG tablet; Take 1 tablet (40 mg total) by mouth once daily. For acid indigestion in the morning    Diarrhea, unspecified type   Diarrhea is infrequent per  patient's description and is relieved with Pepto-Bismol, to continue over-the-counter Pepto-Bismol as needed    Essential hypertension   Blood pressure controlled 138/86, continue current medication without change    Chronic atrial fibrillation   Stable, asymptomatic chronic condition.  Will continue to maximize risk factor reduction and adjust medication as needed.    BMI 23.0-23.9, adult   BMI today is 23.34 and the patient has lost 6 lb since her last visit on 5/10/2019   Maintain healthy weight with [facility] diet and exercise/active lifestyle   Return visit scheduled in six weeks to recheck weight    Follow up in about 6 weeks (around 7/16/2019) for 40 minutes, F/U gastritis, weight.      Patient readiness: acceptance and barriers:none    During the course of the visit the patient was educated and counseled about the following:     Hypertension:   Medication: no change.  Dietary sodium restriction.  Regular aerobic exercise.  Follow up: 6 weeks and as needed.    Goals: Hypertension: Reduce Blood Pressure    Did patient meet goals/outcomes: Yes    The following self management tools provided: declined    Patient Instructions (the written plan) was given to the patient/family.     Time spent with patient: 30 minutes    Barriers to medications present (no )    Adverse reactions to current medications (no)    Over the counter medications reviewed (Yes)

## 2019-06-05 PROBLEM — K30 ACID INDIGESTION: Status: ACTIVE | Noted: 2019-06-05

## 2019-06-06 LAB
BACTERIA UR CULT: NORMAL
BILIRUB SERPL-MCNC: NORMAL MG/DL
BLOOD URINE, POC: NORMAL
COLOR, POC UA: YELLOW
GLUCOSE UR QL STRIP: NORMAL
KETONES UR QL STRIP: NORMAL
LEUKOCYTE ESTERASE URINE, POC: NORMAL
NITRITE, POC UA: NORMAL
PH, POC UA: 7
PROTEIN, POC: NORMAL
SPECIFIC GRAVITY, POC UA: 1.01
UROBILINOGEN, POC UA: NORMAL

## 2019-06-17 ENCOUNTER — HOSPITAL ENCOUNTER (OUTPATIENT)
Dept: RADIOLOGY | Facility: CLINIC | Age: 84
Discharge: HOME | End: 2019-06-17
Attending: INTERNAL MEDICINE
Payer: MEDICARE

## 2019-06-17 DIAGNOSIS — E21.0 PRIMARY HYPERPARATHYROIDISM: ICD-10-CM

## 2019-06-17 DIAGNOSIS — E04.9 GOITER: ICD-10-CM

## 2019-06-17 DIAGNOSIS — E04.1 NODULAR THYROID DISEASE: ICD-10-CM

## 2019-06-17 PROCEDURE — 76536 US EXAM OF HEAD AND NECK: CPT | Mod: TC,PO

## 2019-06-17 PROCEDURE — 76536 US SOFT TISSUE HEAD NECK THYROID: ICD-10-PCS | Mod: 26,,, | Performed by: RADIOLOGY

## 2019-06-17 PROCEDURE — 76536 US EXAM OF HEAD AND NECK: CPT | Mod: 26,,, | Performed by: RADIOLOGY

## 2019-07-02 ENCOUNTER — TELEPHONE (OUTPATIENT)
Dept: ENDOCRINOLOGY | Facility: CLINIC | Age: 84
End: 2019-07-02

## 2019-07-02 NOTE — TELEPHONE ENCOUNTER
----- Message from Moni Edmondson sent at 7/2/2019  8:37 AM CDT -----  Type:  Test Results    Who Called:  patient  Name of Test (Lab/Mammo/Etc):  Ultrasound and blood work  Date of Test:  05 17 19  Ordering Provider:  Dr Wong  Where the test was performed:  [facility]  Best Call Back Number:  [phone]  Additional Information:  Please call patient with these results

## 2019-07-02 NOTE — TELEPHONE ENCOUNTER
Called patient and informed her of the results of her recent Thyroid Ultrasound. Patient verbalized understanding and would also like her blood work results mailed to her. Results mailed.

## 2019-09-27 ENCOUNTER — OFFICE VISIT (OUTPATIENT)
Dept: FAMILY MEDICINE | Facility: CLINIC | Age: 84
End: 2019-09-27
Payer: MEDICARE

## 2019-09-27 VITALS
SYSTOLIC BLOOD PRESSURE: 148 MMHG | DIASTOLIC BLOOD PRESSURE: 76 MMHG | WEIGHT: 140.44 LBS | BODY MASS INDEX: 23.98 KG/M2 | HEIGHT: 64 IN | OXYGEN SATURATION: 97 % | RESPIRATION RATE: 16 BRPM | TEMPERATURE: 98 F | HEART RATE: 73 BPM

## 2019-09-27 DIAGNOSIS — N30.01 ACUTE CYSTITIS WITH HEMATURIA: Primary | ICD-10-CM

## 2019-09-27 DIAGNOSIS — R30.0 DYSURIA: ICD-10-CM

## 2019-09-27 DIAGNOSIS — R35.0 URINARY FREQUENCY: ICD-10-CM

## 2019-09-27 DIAGNOSIS — R82.998 URINE WBC INCREASED: ICD-10-CM

## 2019-09-27 LAB
BILIRUB SERPL-MCNC: ABNORMAL MG/DL
BLOOD URINE, POC: 250
COLOR, POC UA: ABNORMAL
GLUCOSE UR QL STRIP: ABNORMAL
KETONES UR QL STRIP: ABNORMAL
LEUKOCYTE ESTERASE URINE, POC: ABNORMAL
NITRITE, POC UA: ABNORMAL
PH, POC UA: 6
PROTEIN, POC: ABNORMAL
SPECIFIC GRAVITY, POC UA: 1
UROBILINOGEN, POC UA: ABNORMAL

## 2019-09-27 PROCEDURE — 99999 PR PBB SHADOW E&M-EST. PATIENT-LVL V: ICD-10-PCS | Mod: PBBFAC,,, | Performed by: NURSE PRACTITIONER

## 2019-09-27 PROCEDURE — 99214 OFFICE O/P EST MOD 30 MIN: CPT | Mod: S$PBB,,, | Performed by: NURSE PRACTITIONER

## 2019-09-27 PROCEDURE — 99214 PR OFFICE/OUTPT VISIT, EST, LEVL IV, 30-39 MIN: ICD-10-PCS | Mod: S$PBB,,, | Performed by: NURSE PRACTITIONER

## 2019-09-27 PROCEDURE — 99999 PR PBB SHADOW E&M-EST. PATIENT-LVL V: CPT | Mod: PBBFAC,,, | Performed by: NURSE PRACTITIONER

## 2019-09-27 PROCEDURE — 81001 URINALYSIS AUTO W/SCOPE: CPT | Mod: PBBFAC,PO | Performed by: NURSE PRACTITIONER

## 2019-09-27 PROCEDURE — 99215 OFFICE O/P EST HI 40 MIN: CPT | Mod: PBBFAC,PO | Performed by: NURSE PRACTITIONER

## 2019-09-27 RX ORDER — LEVOTHYROXINE SODIUM 75 UG/1
75 TABLET ORAL DAILY
COMMUNITY
End: 2019-12-26 | Stop reason: SDUPTHER

## 2019-09-27 RX ORDER — SILVER SULFADIAZINE 10 G/1000G
CREAM TOPICAL
Refills: 1 | COMMUNITY
Start: 2019-08-21 | End: 2020-08-06

## 2019-09-27 RX ORDER — NITROFURANTOIN (MACROCRYSTALS) 100 MG/1
100 CAPSULE ORAL EVERY 12 HOURS
Qty: 14 CAPSULE | Refills: 0 | Status: SHIPPED | OUTPATIENT
Start: 2019-09-27 | End: 2019-10-04 | Stop reason: ALTCHOICE

## 2019-09-27 RX ORDER — AMLODIPINE BESYLATE 2.5 MG/1
2.5 TABLET ORAL DAILY
COMMUNITY
End: 2020-06-03

## 2019-09-27 RX ORDER — NYSTATIN 100000 U/G
CREAM TOPICAL
Refills: 5 | COMMUNITY
Start: 2019-07-05 | End: 2020-08-06

## 2019-09-27 NOTE — PATIENT INSTRUCTIONS
Controlling High Blood Pressure  High blood pressure (hypertension) is often called the silent killer. This is because many people who have it dont know it. High blood pressure is defined as 140/90 mm Hg or higher. Know your blood pressure and remember to check it regularly. Doing so can save your life. Here are some things you can do to help control your blood pressure.    Choose heart-healthy foods  · Select low-salt, low-fat foods. Limit sodium intake to 2,400 mg per day or the amount suggested by your healthcare provider.  · Limit canned, dried, cured, packaged, and fast foods. These can contain a lot of salt.  · Eat 8 to 10 servings of fruits and vegetables every day.  · Choose lean meats, fish, or chicken.  · Eat whole-grain pasta, brown rice, and beans.  · Eat 2 to 3 servings of low-fat or fat-free dairy products.  · Ask your doctor about the DASH eating plan. This plan helps reduce blood pressure.  · When you go to a restaurant, ask that your meal be prepared with no added salt.  Maintain a healthy weight  · Ask your healthcare provider how many calories to eat a day. Then stick to that number.  · Ask your healthcare provider what weight range is healthiest for you. If you are overweight, a weight loss of only 3% to 5% of your body weight can help lower blood pressure. Generally, a good weight loss goal is to lose 10% of your body weight in a year.  · Limit snacks and sweets.  · Get regular exercise.  Get up and get active  · Choose activities you enjoy. Find ones you can do with friends or family. This includes bicycling, dancing, walking, and jogging.  · Park farther away from building entrances.  · Use stairs instead of the elevator.  · When you can, walk or bike instead of driving.  · [place] leaves, garden, or do household repairs.  · Be active at a moderate to vigorous level of physical activity for at least 40 minutes for a minimum of 3 to 4 days a week.   Manage stress  · Make time to relax and enjoy  life. Find time to laugh.  · Communicate your concerns with your loved ones and your healthcare provider.  · Visit with family and friends, and keep up with hobbies.  Limit alcohol and quit smoking  · Men should have no more than 2 drinks per day.  · Women should have no more than 1 drink per day.  · Talk with your healthcare provider about quitting smoking. Smoking significantly increases your risk for heart disease and stroke. Ask your healthcare provider about community smoking cessation programs and other options.  Medicines  If lifestyle changes arent enough, your healthcare provider may prescribe high blood pressure medicine. Take all medicines as prescribed. If you have any questions about your medicines, ask your healthcare provider before stopping or changing them.   Date Last Reviewed: 4/27/2016 © 2000-2017 [employer]. [address] [place] [address], [place] [address]. All rights reserved. This information is not intended as a substitute for professional medical care. Always follow your healthcare professional's instructions.        Urinary Tract Infections in Women    Urinary tract infections (UTIs) are most often caused by bacteria (germs). These bacteria enter the urinary tract. The bacteria may come from outside the body. Or they may travel from the skin outside the rectum or vagina into the urethra. Female anatomy makes it easy for bacteria from the bowel to enter a womans urinary tract, which is the most common source of UTI. This means women develop UTIs more often than men. Pain in or around the urinary tract is a common UTI symptom. But the only way to know for sure if you have a UTI for the healthcare provider to test your urine. The two tests that may be done are the urinalysis and urine culture.  Types of UTIs  · Cystitis: A bladder infection (cystitis) is the most common UTI in women. You may have urgent or frequent urination. You may also have pain, burning when you urinate, and  bloody urine.  · Urethritis: This is an inflamed urethra, which is the tube that carries urine from the bladder to outside the body. You may have lower stomach or back pain. You may also have urgent or frequent urination.  · Pyelonephritis: This is a kidney infection. If not treated, it can be serious and damage your kidneys. In severe cases, you may be hospitalized. You may have a fever and lower back pain.  Medicines to treat a UTI  Most UTIs are treated with antibiotics. These kill the bacteria. The length of time you need to take them depends on the type of infection. It may be as short as 3 days. If you have repeated UTIs, a low-dose antibiotic may be needed for several months. Take antibiotics exactly as directed. Dont stop taking them until all of the medicine is gone. If you stop taking the antibiotic too soon, the infection may not go away, and you may develop a resistance to the antibiotic. This can make it much harder to treat.  Lifestyle changes to treat and prevent UTIs  The lifestyle changes below will help get rid of your UTI. They may also help prevent future UTIs.  · Drink plenty of fluids. This includes water, juice, or other caffeine-free drinks. Fluids help flush bacteria out of your body.  · Empty your bladder. Always empty your bladder when you feel the urge to urinate. And always urinate before going to sleep. Urine that stays in your bladder can lead to infection. Try to urinate before and after sex as well.  · Practice good personal hygiene. Wipe yourself from front to back after using the toilet. This helps keep bacteria from getting into the urethra.  · Use condoms during sex. These help prevent UTIs caused by sexually transmitted bacteria. Also, avoid using spermicides during sex. These can increase the risk of UTIs. Choose other forms of birth control instead. For women who tend to get UTIs after sex, a low-dose of a preventive antibiotic may be used. Be sure to discuss this option with  your healthcare provider.  · Follow up with your healthcare provider as directed. He or she may test to make sure the infection has cleared. If needed, more treatment may be started.  Date Last Reviewed: 1/1/2017  © 7321-8254 [employer]. [address] [place] [address], [place] [address]. All rights reserved. This information is not intended as a substitute for professional medical care. Always follow your healthcare professional's instructions.

## 2019-09-27 NOTE — PROGRESS NOTES
Subjective:       Patient ID: Ama Lang is a 89 y.o. female.    Chief Complaint: Urinary Tract Infection  This is her first time seeing me in the clinic.  She last saw MARTINA Gerard NP on 06/04/2019.  She is accompanied by her daughter-in-law.  HPI   She is here with concerns about having a urinary tract infection. She states she is having bottom stomach ache and lower back pain.  Vitals:    09/27/19 1413   BP: (!) 148/76   Pulse: 73   Resp: 16   Temp: 97.9 °F (36.6 °C)     Review of Systems   Gastrointestinal:        Lower pelvic pain   Genitourinary: Positive for frequency.       Objective:      Physical Exam   Constitutional: She is oriented to person, place, and time. Vital signs are normal. She appears well-developed and well-nourished. She is active and cooperative.   HENT:   Head: Normocephalic and atraumatic.   Right Ear: Tympanic membrane, external ear and ear canal normal.   Left Ear: External ear and ear canal normal.   Nose: Nose normal.   Mouth/Throat: Uvula is midline, oropharynx is clear and moist and mucous membranes are normal.   Left ear hearing aid in place   Eyes: Lids are normal.   Neck: Trachea normal, normal range of motion, full passive range of motion without pain and phonation normal. Neck supple.   Cardiovascular: Normal rate. An irregular rhythm present.   Pulmonary/Chest: Effort normal and breath sounds normal.   Abdominal: There is tenderness in the suprapubic area. There is no rigidity and no CVA tenderness.       Slight tenderness to lower pelvis/suprapubic region   Lymphadenopathy:        Head (right side): No submental, no submandibular, no tonsillar, no preauricular, no posterior auricular and no occipital adenopathy present.        Head (left side): No submental, no submandibular, no tonsillar, no preauricular, no posterior auricular and no occipital adenopathy present.     She has no cervical adenopathy.   Neurological: She is alert and oriented to person, place, and time.    Skin: Skin is warm, dry and intact.   Psychiatric: She has a normal mood and affect. Her speech is normal and behavior is normal. Judgment and thought content normal. Cognition and memory are impaired.   Nursing note and vitals reviewed.      Assessment & Plan:       Acute cystitis with hematuria  -     nitrofurantoin (MACRODANTIN) 100 MG capsule; Take 1 capsule (100 mg total) by mouth every 12 (twelve) hours.  Dispense: 14 capsule; Refill: 0    Urine WBC increased  -     nitrofurantoin (MACRODANTIN) 100 MG capsule; Take 1 capsule (100 mg total) by mouth every 12 (twelve) hours.  Dispense: 14 capsule; Refill: 0    Dysuria  -     POCT urinalysis, dipstick or tablet reag  -     nitrofurantoin (MACRODANTIN) 100 MG capsule; Take 1 capsule (100 mg total) by mouth every 12 (twelve) hours.  Dispense: 14 capsule; Refill: 0    Urinary frequency  -     nitrofurantoin (MACRODANTIN) 100 MG capsule; Take 1 capsule (100 mg total) by mouth every 12 (twelve) hours.  Dispense: 14 capsule; Refill: 0          Medication List with Changes/Refills   New Medications    NITROFURANTOIN (MACRODANTIN) 100 MG CAPSULE    Take 1 capsule (100 mg total) by mouth every 12 (twelve) hours.   Current Medications    ACETAMINOPHEN (TYLENOL 8 HOUR ORAL)    Take 2 capsules by mouth daily as needed.    ALENDRONATE (FOSAMAX) 70 MG TABLET    Take 70 mg by mouth every 7 days.    AMLODIPINE (NORVASC) 2.5 MG TABLET    Take 1 tablet (2.5 mg total) by mouth once daily.    AMLODIPINE (NORVASC) 2.5 MG TABLET    Take 2.5 mg by mouth once daily.    APIXABAN (ELIQUIS) 2.5 MG TAB    Take 2.5 mg by mouth 2 (two) times daily.     ATENOLOL (TENORMIN) 50 MG TABLET    Take 1 tablet (50 mg total) by mouth 2 (two) times daily.    AZELASTINE (ASTELIN) 137 MCG (0.1 %) NASAL SPRAY    SPRAY 1 SPRAY INTO EACH NOSTRIL TWICE A DAY    ERGOCALCIFEROL (ERGOCALCIFEROL) 50,000 UNIT CAP    Take 1 capsule (50,000 Units total) by mouth once daily. Take 2000 IU of vitamin D daily after 30  days.    FLUTICASONE (FLONASE) 50 MCG/ACTUATION NASAL SPRAY    SNIFF 2 SPRAYS INTO EACH NOSTRIL ONCE DAILY    HYDROCORTISONE 2.5 % CREAM    Apply topically 2 (two) times daily.    KETOCONAZOLE (NIZORAL) 2 % SHAMPOO    APPLY TO AFFECTED AREA ON THE SKIN 3 TIMES A week    LEVOTHYROXINE (SYNTHROID) 75 MCG TABLET    Take 75 mcg by mouth once daily.    LISINOPRIL (PRINIVIL,ZESTRIL) 40 MG TABLET    TAKE ONE TABLET BY MOUTH EVERY DAY    LORATADINE (CLARITIN) 10 MG TABLET    Take 1 tablet (10 mg total) by mouth once daily.    MULTIVITAMIN (ONE DAILY MULTIVITAMIN) PER TABLET    Take 1 tablet by mouth once daily.    NYSTATIN (MYCOSTATIN) CREAM        PANTOPRAZOLE (PROTONIX) 40 MG TABLET    Take 1 tablet (40 mg total) by mouth once daily. For acid indigestion in the morning    SILVER SULFADIAZINE 1% (SILVADENE) 1 % CREAM        SIMVASTATIN (ZOCOR) 20 MG TABLET    TAKE 1 TABLET (20 MG TOTAL) BY MOUTH EVERY EVENING.    TRIAMCINOLONE ACETONIDE 0.025% (KENALOG) 0.025 % CREAM    1 application 2 (two) times daily as needed. Apply to affected area    VIT C/VIT E/LUTEIN/MIN/OMEGA-3 (OCUVITE ORAL)    Take 1 tablet by mouth once daily.   Discontinued Medications    LEVOTHYROXINE (SYNTHROID) 75 MCG TABLET    Take 1 tablet (75 mcg total) by mouth once daily.     Follow up in about 3 months (around 12/27/2019), or if symptoms worsen or fail to improve.

## 2019-10-03 ENCOUNTER — TELEPHONE (OUTPATIENT)
Dept: FAMILY MEDICINE | Facility: CLINIC | Age: 84
End: 2019-10-03

## 2019-10-03 NOTE — TELEPHONE ENCOUNTER
Spoke to pt. Pt states she is having abdominal pain, frequent urination and pain upon urination. Pt is requesting an antibiotic. Attempted to schedule pt to see someone, but was unable to schedule. Message was sent to [facility]. Please advise.

## 2019-10-03 NOTE — TELEPHONE ENCOUNTER
----- Message from Emilee Becker sent at 10/3/2019  2:45 PM CDT -----  Type: Needs Medical Advice    Who Called:  Ama  Symptoms (please be specific):  Stomach pain, urinating frequently and pain with urination  How long has patient had these symptoms:  Worse in the last 3 days  Pharmacy name and phone #:    [facility] - [place], [place] - [address] [facility] [address] [id number] [address]  [place] [address]  Phone: [phone] Fax: [phone]  Best Call Back Number: [phone]  Additional Information: Thank you!

## 2019-10-04 ENCOUNTER — OFFICE VISIT (OUTPATIENT)
Dept: FAMILY MEDICINE | Facility: CLINIC | Age: 84
End: 2019-10-04
Payer: MEDICARE

## 2019-10-04 VITALS
SYSTOLIC BLOOD PRESSURE: 150 MMHG | WEIGHT: 136.88 LBS | HEIGHT: 64 IN | BODY MASS INDEX: 23.37 KG/M2 | TEMPERATURE: 98 F | RESPIRATION RATE: 12 BRPM | HEART RATE: 67 BPM | DIASTOLIC BLOOD PRESSURE: 66 MMHG | OXYGEN SATURATION: 97 %

## 2019-10-04 DIAGNOSIS — N39.0 COMPLICATED UTI (URINARY TRACT INFECTION): ICD-10-CM

## 2019-10-04 DIAGNOSIS — N39.0 URINARY TRACT INFECTION WITH HEMATURIA, SITE UNSPECIFIED: ICD-10-CM

## 2019-10-04 DIAGNOSIS — R30.0 DYSURIA: Primary | ICD-10-CM

## 2019-10-04 DIAGNOSIS — R39.15 URINARY URGENCY: ICD-10-CM

## 2019-10-04 DIAGNOSIS — R31.9 URINARY TRACT INFECTION WITH HEMATURIA, SITE UNSPECIFIED: ICD-10-CM

## 2019-10-04 LAB
BILIRUB SERPL-MCNC: ABNORMAL MG/DL
BLOOD URINE, POC: 250
COLOR, POC UA: ABNORMAL
GLUCOSE UR QL STRIP: ABNORMAL
KETONES UR QL STRIP: ABNORMAL
LEUKOCYTE ESTERASE URINE, POC: ABNORMAL
NITRITE, POC UA: ABNORMAL
PH, POC UA: 7
PROTEIN, POC: ABNORMAL
SPECIFIC GRAVITY, POC UA: 100
UROBILINOGEN, POC UA: ABNORMAL

## 2019-10-04 PROCEDURE — 99215 OFFICE O/P EST HI 40 MIN: CPT | Mod: PBBFAC,PO | Performed by: NURSE PRACTITIONER

## 2019-10-04 PROCEDURE — 90662 IIV NO PRSV INCREASED AG IM: CPT | Mod: PBBFAC,PO

## 2019-10-04 PROCEDURE — 99214 OFFICE O/P EST MOD 30 MIN: CPT | Mod: S$PBB,,, | Performed by: NURSE PRACTITIONER

## 2019-10-04 PROCEDURE — 87186 SC STD MICRODIL/AGAR DIL: CPT

## 2019-10-04 PROCEDURE — 99999 PR PBB SHADOW E&M-EST. PATIENT-LVL V: ICD-10-PCS | Mod: PBBFAC,,, | Performed by: NURSE PRACTITIONER

## 2019-10-04 PROCEDURE — 87086 URINE CULTURE/COLONY COUNT: CPT

## 2019-10-04 PROCEDURE — 99214 PR OFFICE/OUTPT VISIT, EST, LEVL IV, 30-39 MIN: ICD-10-PCS | Mod: S$PBB,,, | Performed by: NURSE PRACTITIONER

## 2019-10-04 PROCEDURE — 87077 CULTURE AEROBIC IDENTIFY: CPT

## 2019-10-04 PROCEDURE — 87088 URINE BACTERIA CULTURE: CPT

## 2019-10-04 PROCEDURE — 81001 URINALYSIS AUTO W/SCOPE: CPT | Mod: PBBFAC,PO | Performed by: NURSE PRACTITIONER

## 2019-10-04 PROCEDURE — 99999 PR PBB SHADOW E&M-EST. PATIENT-LVL V: CPT | Mod: PBBFAC,,, | Performed by: NURSE PRACTITIONER

## 2019-10-04 RX ORDER — OXYBUTYNIN CHLORIDE 5 MG/1
5 TABLET, EXTENDED RELEASE ORAL DAILY
Qty: 30 TABLET | Refills: 3 | Status: SHIPPED | OUTPATIENT
Start: 2019-10-04 | End: 2020-01-03 | Stop reason: SDUPTHER

## 2019-10-04 RX ORDER — CIPROFLOXACIN 500 MG/1
500 TABLET ORAL 2 TIMES DAILY
Qty: 10 TABLET | Refills: 0 | Status: SHIPPED | OUTPATIENT
Start: 2019-10-04 | End: 2019-10-09

## 2019-10-04 RX ORDER — CEPHALEXIN 500 MG/1
CAPSULE ORAL
Refills: 0 | COMMUNITY
Start: 2019-08-07 | End: 2019-10-04 | Stop reason: ALTCHOICE

## 2019-10-04 RX ORDER — MONTELUKAST SODIUM 10 MG/1
10 TABLET ORAL DAILY
Refills: 3 | COMMUNITY
Start: 2019-08-01 | End: 2020-01-03

## 2019-10-04 NOTE — PROGRESS NOTES
"Subjective:       Patient ID: Ama Lang is a 89 y.o. female.    Chief Complaint: Urinary Tract Infection  She was last seen in primary care by me on 09/27/2019 for UTI.  She is accompanied today by her daughter.  HPI   Urinary frequency, lower pelvis and lower left flank discomfort.  Vitals:    10/04/19 0937   BP: (!) 150/66   Pulse: 67   Resp: 12   Temp: 98.1 °F (36.7 °C)     BP Readings from Last 3 Encounters:   10/04/19 (!) 150/66   09/27/19 (!) 148/76   06/04/19 138/86     Review of Systems    She state she is having to use pads for urinary incontinence over th past 2 months  Urinary urgency and "sometimes don't make it".  Objective:      Physical Exam   Constitutional: She is oriented to person, place, and time. Vital signs are normal. She appears well-developed and well-nourished. She is active and cooperative.   HENT:   Head: Normocephalic and atraumatic.   Right Ear: Tympanic membrane, external ear and ear canal normal.   Left Ear: Tympanic membrane, external ear and ear canal normal.   Nose: Nose normal.   Mouth/Throat: Uvula is midline, oropharynx is clear and moist and mucous membranes are normal.   Neck: Normal range of motion. Neck supple.   Pulmonary/Chest: Effort normal and breath sounds normal.   Abdominal: Soft. Bowel sounds are normal. There is tenderness in the suprapubic area. There is no rigidity, no rebound, no guarding and no CVA tenderness.       Pain to left lower abdomen near pelvis    Musculoskeletal: Normal range of motion.   Lymphadenopathy:        Head (right side): No submental, no submandibular, no tonsillar, no preauricular, no posterior auricular and no occipital adenopathy present.        Head (left side): No submental, no submandibular, no tonsillar, no preauricular, no posterior auricular and no occipital adenopathy present.     She has no cervical adenopathy.   Neurological: She is alert and oriented to person, place, and time.   Skin: Skin is warm, dry and intact. "   Psychiatric: She has a normal mood and affect. Her speech is normal and behavior is normal. Judgment and thought content normal. Cognition and memory are impaired.   Nursing note and vitals reviewed.      Assessment & Plan:       Dysuria  -     POCT urinalysis, dipstick or tablet reag  -     oxybutynin (DITROPAN-XL) 5 MG TR24; Take 1 tablet (5 mg total) by mouth once daily.  Dispense: 30 tablet; Refill: 3    Complicated UTI (urinary tract infection)  -     Urine culture  -     ciprofloxacin HCl (CIPRO) 500 MG tablet; Take 1 tablet (500 mg total) by mouth 2 (two) times daily. for 5 days  Dispense: 10 tablet; Refill: 0    Urinary urgency  -     oxybutynin (DITROPAN-XL) 5 MG TR24; Take 1 tablet (5 mg total) by mouth once daily.  Dispense: 30 tablet; Refill: 3    Urinary tract infection with hematuria, site unspecified  -     Urine culture      Medication List with Changes/Refills   New Medications    CIPROFLOXACIN HCL (CIPRO) 500 MG TABLET    Take 1 tablet (500 mg total) by mouth 2 (two) times daily. for 5 days    OXYBUTYNIN (DITROPAN-XL) 5 MG TR24    Take 1 tablet (5 mg total) by mouth once daily.   Current Medications    ACETAMINOPHEN (TYLENOL 8 HOUR ORAL)    Take 2 capsules by mouth daily as needed.    ALENDRONATE (FOSAMAX) 70 MG TABLET    Take 70 mg by mouth every 7 days.    AMLODIPINE (NORVASC) 2.5 MG TABLET    Take 2.5 mg by mouth once daily.    APIXABAN (ELIQUIS) 2.5 MG TAB    Take 2.5 mg by mouth 2 (two) times daily.     ATENOLOL (TENORMIN) 50 MG TABLET    Take 1 tablet (50 mg total) by mouth 2 (two) times daily.    AZELASTINE (ASTELIN) 137 MCG (0.1 %) NASAL SPRAY    SPRAY 1 SPRAY INTO EACH NOSTRIL TWICE A DAY    FLUTICASONE (FLONASE) 50 MCG/ACTUATION NASAL SPRAY    SNIFF 2 SPRAYS INTO EACH NOSTRIL ONCE DAILY    HYDROCORTISONE 2.5 % CREAM    Apply topically 2 (two) times daily.    KETOCONAZOLE (NIZORAL) 2 % SHAMPOO    APPLY TO AFFECTED AREA ON THE SKIN 3 TIMES A week    LEVOTHYROXINE (SYNTHROID) 75 MCG TABLET     Take 75 mcg by mouth once daily.    LISINOPRIL (PRINIVIL,ZESTRIL) 40 MG TABLET    TAKE ONE TABLET BY MOUTH EVERY DAY    LORATADINE (CLARITIN) 10 MG TABLET    Take 1 tablet (10 mg total) by mouth once daily.    MONTELUKAST (SINGULAIR) 10 MG TABLET    Take 10 mg by mouth once daily.    MULTIVITAMIN (ONE DAILY MULTIVITAMIN) PER TABLET    Take 1 tablet by mouth once daily.    NYSTATIN (MYCOSTATIN) CREAM        PANTOPRAZOLE (PROTONIX) 40 MG TABLET    Take 1 tablet (40 mg total) by mouth once daily. For acid indigestion in the morning    SILVER SULFADIAZINE 1% (SILVADENE) 1 % CREAM    as needed.     SIMVASTATIN (ZOCOR) 20 MG TABLET    TAKE 1 TABLET (20 MG TOTAL) BY MOUTH EVERY EVENING.    TRIAMCINOLONE ACETONIDE 0.025% (KENALOG) 0.025 % CREAM    1 application 2 (two) times daily as needed. Apply to affected area    VIT C/VIT E/LUTEIN/MIN/OMEGA-3 (OCUVITE ORAL)    Take 1 tablet by mouth once daily.   Discontinued Medications    CEPHALEXIN (KEFLEX) 500 MG CAPSULE        ERGOCALCIFEROL (ERGOCALCIFEROL) 50,000 UNIT CAP    Take 1 capsule (50,000 Units total) by mouth once daily. Take 2000 IU of vitamin D daily after 30 days.    NITROFURANTOIN (MACRODANTIN) 100 MG CAPSULE    Take 1 capsule (100 mg total) by mouth every 12 (twelve) hours.         Follow up in about 4 weeks (around 11/1/2019), or if symptoms worsen or fail to improve.

## 2019-10-04 NOTE — PROGRESS NOTES
Identified pt using name and ..Procedure was explained to pt and VIS was given. Flu zone HD was administered IM in left deltoid using sterile technique. No bleeding noted at injection site.

## 2019-10-04 NOTE — PATIENT INSTRUCTIONS
Controlling High Blood Pressure  High blood pressure (hypertension) is often called the silent killer. This is because many people who have it dont know it. High blood pressure is defined as 140/90 mm Hg or higher. Know your blood pressure and remember to check it regularly. Doing so can save your life. Here are some things you can do to help control your blood pressure.    Choose heart-healthy foods  · Select low-salt, low-fat foods. Limit sodium intake to 2,400 mg per day or the amount suggested by your healthcare provider.  · Limit canned, dried, cured, packaged, and fast foods. These can contain a lot of salt.  · Eat 8 to 10 servings of fruits and vegetables every day.  · Choose lean meats, fish, or chicken.  · Eat whole-grain pasta, brown rice, and beans.  · Eat 2 to 3 servings of low-fat or fat-free dairy products.  · Ask your doctor about the DASH eating plan. This plan helps reduce blood pressure.  · When you go to a restaurant, ask that your meal be prepared with no added salt.  Maintain a healthy weight  · Ask your healthcare provider how many calories to eat a day. Then stick to that number.  · Ask your healthcare provider what weight range is healthiest for you. If you are overweight, a weight loss of only 3% to 5% of your body weight can help lower blood pressure. Generally, a good weight loss goal is to lose 10% of your body weight in a year.  · Limit snacks and sweets.  · Get regular exercise.  Get up and get active  · Choose activities you enjoy. Find ones you can do with friends or family. This includes bicycling, dancing, walking, and jogging.  · Park farther away from building entrances.  · Use stairs instead of the elevator.  · When you can, walk or bike instead of driving.  · [place] leaves, garden, or do household repairs.  · Be active at a moderate to vigorous level of physical activity for at least 40 minutes for a minimum of 3 to 4 days a week.   Manage stress  · Make time to relax and enjoy  life. Find time to laugh.  · Communicate your concerns with your loved ones and your healthcare provider.  · Visit with family and friends, and keep up with hobbies.  Limit alcohol and quit smoking  · Men should have no more than 2 drinks per day.  · Women should have no more than 1 drink per day.  · Talk with your healthcare provider about quitting smoking. Smoking significantly increases your risk for heart disease and stroke. Ask your healthcare provider about community smoking cessation programs and other options.  Medicines  If lifestyle changes arent enough, your healthcare provider may prescribe high blood pressure medicine. Take all medicines as prescribed. If you have any questions about your medicines, ask your healthcare provider before stopping or changing them.   Date Last Reviewed: 4/27/2016  © 5500-9713 [employer], [employer]. [address] [place] [address], [place] [address]. All rights reserved. This information is not intended as a substitute for professional medical care. Always follow your healthcare professional's instructions.

## 2019-10-08 LAB — BACTERIA UR CULT: ABNORMAL

## 2019-11-15 RX ORDER — ALENDRONATE SODIUM 70 MG/1
70 TABLET ORAL
Qty: 12 TABLET | Refills: 3 | Status: SHIPPED | OUTPATIENT
Start: 2019-11-15 | End: 2020-08-26 | Stop reason: SDUPTHER

## 2019-11-15 NOTE — TELEPHONE ENCOUNTER
----- Message from Franci Fitzgeraldmathew sent at 11/15/2019  8:40 AM CST -----  Contact: Self  Type:  RX Refill Request    Who Called:  patient  Refill or New Rx:  New RX  RX Name and Strength:  alendronate (FOSAMAX) 70 MG tablet  How is the patient currently taking it? (ex. 1XDay):  1XWk  Is this a 30 day or 90 day RX:  90  Preferred Pharmacy with phone number:    [facility] - [facility] [id number] [facility] [address]  [place] [address]  Phone: [phone] Fax: [phone]  Local or Mail Order:  local  Ordering Provider:  Kiarra Hinds  Best Call Back Number: [phone] (home)   Additional Information:  marisela

## 2019-12-11 ENCOUNTER — DOCUMENTATION ONLY (OUTPATIENT)
Dept: FAMILY MEDICINE | Facility: CLINIC | Age: 84
End: 2019-12-11

## 2019-12-11 NOTE — PROGRESS NOTES
Pre-Visit Chart Review  For Appointment Scheduled on 12-23-19    Health Maintenance Due   Topic Date Due    TETANUS VACCINE  02/11/1946

## 2019-12-23 ENCOUNTER — LAB VISIT (OUTPATIENT)
Dept: LAB | Facility: HOSPITAL | Age: 84
End: 2019-12-23
Attending: FAMILY MEDICINE
Payer: MEDICARE

## 2019-12-23 ENCOUNTER — OFFICE VISIT (OUTPATIENT)
Dept: FAMILY MEDICINE | Facility: CLINIC | Age: 84
End: 2019-12-23
Attending: FAMILY MEDICINE
Payer: MEDICARE

## 2019-12-23 VITALS
BODY MASS INDEX: 24.01 KG/M2 | WEIGHT: 140.63 LBS | OXYGEN SATURATION: 94 % | TEMPERATURE: 98 F | HEIGHT: 64 IN | HEART RATE: 79 BPM | SYSTOLIC BLOOD PRESSURE: 124 MMHG | DIASTOLIC BLOOD PRESSURE: 72 MMHG

## 2019-12-23 DIAGNOSIS — E83.52 HYPERCALCEMIA: ICD-10-CM

## 2019-12-23 DIAGNOSIS — E03.9 ACQUIRED HYPOTHYROIDISM: ICD-10-CM

## 2019-12-23 DIAGNOSIS — E55.9 VITAMIN D DEFICIENCY: ICD-10-CM

## 2019-12-23 DIAGNOSIS — I10 HYPERTENSION, ESSENTIAL: ICD-10-CM

## 2019-12-23 DIAGNOSIS — I10 HYPERTENSION, ESSENTIAL: Primary | ICD-10-CM

## 2019-12-23 LAB
ANION GAP SERPL CALC-SCNC: 7 MMOL/L (ref 8–16)
BUN SERPL-MCNC: 9 MG/DL (ref 10–30)
CALCIUM SERPL-MCNC: 10.6 MG/DL (ref 8.7–10.5)
CHLORIDE SERPL-SCNC: 104 MMOL/L (ref 95–110)
CO2 SERPL-SCNC: 28 MMOL/L (ref 23–29)
CREAT SERPL-MCNC: 0.8 MG/DL (ref 0.5–1.4)
EST. GFR  (AFRICAN AMERICAN): >60 ML/MIN/1.73 M^2
EST. GFR  (NON AFRICAN AMERICAN): >60 ML/MIN/1.73 M^2
GLUCOSE SERPL-MCNC: 99 MG/DL (ref 70–110)
POTASSIUM SERPL-SCNC: 4.5 MMOL/L (ref 3.5–5.1)
SODIUM SERPL-SCNC: 139 MMOL/L (ref 136–145)

## 2019-12-23 PROCEDURE — 1126F PR PAIN SEVERITY QUANTIFIED, NO PAIN PRESENT: ICD-10-PCS | Mod: ,,, | Performed by: FAMILY MEDICINE

## 2019-12-23 PROCEDURE — 84439 ASSAY OF FREE THYROXINE: CPT

## 2019-12-23 PROCEDURE — 99999 PR PBB SHADOW E&M-EST. PATIENT-LVL IV: CPT | Mod: PBBFAC,,, | Performed by: FAMILY MEDICINE

## 2019-12-23 PROCEDURE — 99214 OFFICE O/P EST MOD 30 MIN: CPT | Mod: S$PBB,,, | Performed by: FAMILY MEDICINE

## 2019-12-23 PROCEDURE — 99999 PR PBB SHADOW E&M-EST. PATIENT-LVL IV: ICD-10-PCS | Mod: PBBFAC,,, | Performed by: FAMILY MEDICINE

## 2019-12-23 PROCEDURE — 99214 OFFICE O/P EST MOD 30 MIN: CPT | Mod: PBBFAC,PO | Performed by: FAMILY MEDICINE

## 2019-12-23 PROCEDURE — 1159F MED LIST DOCD IN RCRD: CPT | Mod: ,,, | Performed by: FAMILY MEDICINE

## 2019-12-23 PROCEDURE — 80048 BASIC METABOLIC PNL TOTAL CA: CPT

## 2019-12-23 PROCEDURE — 82306 VITAMIN D 25 HYDROXY: CPT

## 2019-12-23 PROCEDURE — 1159F PR MEDICATION LIST DOCUMENTED IN MEDICAL RECORD: ICD-10-PCS | Mod: ,,, | Performed by: FAMILY MEDICINE

## 2019-12-23 PROCEDURE — 99214 PR OFFICE/OUTPT VISIT, EST, LEVL IV, 30-39 MIN: ICD-10-PCS | Mod: S$PBB,,, | Performed by: FAMILY MEDICINE

## 2019-12-23 PROCEDURE — 36415 COLL VENOUS BLD VENIPUNCTURE: CPT | Mod: PO

## 2019-12-23 PROCEDURE — 1126F AMNT PAIN NOTED NONE PRSNT: CPT | Mod: ,,, | Performed by: FAMILY MEDICINE

## 2019-12-23 PROCEDURE — 84443 ASSAY THYROID STIM HORMONE: CPT

## 2019-12-23 NOTE — PROGRESS NOTES
Subjective:       Patient ID: Ama Lang is a 89 y.o. female.    Chief Complaint: follow up hypertension    89-year-old female coming in for follow-up on high blood pressure.  Currently she is taking lisinopril 40 mg given to her recently by Leidy, amlodipine 2.5 mg given to her by Dr. Wong, and atenolol, dose unknown given to her by Dr. Mullen.  She does not check her blood pressure at home but states definitively that it is normal.  She does not have a blood pressure cuff at home.  She has been followed by Dr. Wong for hypercalcemia, hyperparathyroidism, vitamin-D deficiency, and hypothyroidism.  She is due for follow-up labs.  She is not fasting today.  She has no complaints and feels well.    Past Medical History:  No date: *Atrial fibrillation      Comment:  Dr. Mullen  No date: Cancer      Comment:  skin cancer arms   No date: Cataract  No date: Diverticulitis  No date: Hyperlipidemia  No date: Hypertension  No date: Hypothyroidism    Past Surgical History:  12/18/2003: COLONOSCOPY      Comment:  Dr. Santoyo  2008: CYSTOSCOPY      Comment:  Dr. Nguyen  No date: DILATION AND CURETTAGE OF UTERUS  No date: EYE SURGERY  08/2019: skin cancer bilateral arms      Comment:  Dr Soler skin cancer both arms   No date: TUBAL LIGATION    Current Outpatient Medications on File Prior to Visit:  ACETAMINOPHEN (TYLENOL 8 HOUR ORAL), Take 2 capsules by mouth daily as needed., Disp: , Rfl:   alendronate (FOSAMAX) 70 MG tablet, Take 1 tablet (70 mg total) by mouth every 7 days., Disp: 12 tablet, Rfl: 3  amLODIPine (NORVASC) 2.5 MG tablet, Take 2.5 mg by mouth once daily., Disp: , Rfl:   apixaban (ELIQUIS) 2.5 mg Tab, Take 2.5 mg by mouth 2 (two) times daily. , Disp: , Rfl:   atenolol (TENORMIN) 50 MG tablet, Take 1 tablet (50 mg total) by mouth 2 (two) times daily., Disp: 180 tablet, Rfl: 0  azelastine (ASTELIN) 137 mcg (0.1 %) nasal spray, SPRAY 1 SPRAY INTO EACH NOSTRIL TWICE A DAY, Disp: 30 mL, Rfl: 1  fluticasone  (FLONASE) 50 mcg/actuation nasal spray, SNIFF 2 SPRAYS INTO EACH NOSTRIL ONCE DAILY, Disp: 16 g, Rfl: 11  hydrocortisone 2.5 % cream, Apply topically 2 (two) times daily., Disp: , Rfl:   ketoconazole (NIZORAL) 2 % shampoo, APPLY TO AFFECTED AREA ON THE SKIN 3 TIMES A week, Disp: , Rfl: 5  levothyroxine (SYNTHROID) 75 MCG tablet, Take 75 mcg by mouth once daily., Disp: , Rfl:   lisinopril (PRINIVIL,ZESTRIL) 40 MG tablet, TAKE ONE TABLET BY MOUTH EVERY DAY, Disp: 90 tablet, Rfl: 3  loratadine (CLARITIN) 10 mg tablet, Take 1 tablet (10 mg total) by mouth once daily., Disp: , Rfl: 0  montelukast (SINGULAIR) 10 mg tablet, Take 10 mg by mouth once daily., Disp: , Rfl: 3  multivitamin (ONE DAILY MULTIVITAMIN) per tablet, Take 1 tablet by mouth once daily., Disp: , Rfl:   nystatin (MYCOSTATIN) cream, , Disp: , Rfl: 5  oxybutynin (DITROPAN-XL) 5 MG TR24, Take 1 tablet (5 mg total) by mouth once daily., Disp: 30 tablet, Rfl: 3  pantoprazole (PROTONIX) 40 MG tablet, Take 1 tablet (40 mg total) by mouth once daily. For acid indigestion in the morning, Disp: 30 tablet, Rfl: 1  silver sulfADIAZINE 1% (SILVADENE) 1 % cream, as needed. , Disp: , Rfl: 1  simvastatin (ZOCOR) 20 MG tablet, TAKE 1 TABLET (20 MG TOTAL) BY MOUTH EVERY EVENING., Disp: 30 tablet, Rfl: 10  triamcinolone acetonide 0.025% (KENALOG) 0.025 % cream, 1 application 2 (two) times daily as needed. Apply to affected area, Disp: , Rfl: 5  VIT C/VIT E/LUTEIN/MIN/OMEGA-3 (OCUVITE ORAL), Take 1 tablet by mouth once daily., Disp: , Rfl:     No current facility-administered medications on file prior to visit.         Review of Systems   Constitutional: Negative for chills, fatigue and fever.   Respiratory: Negative for chest tightness and shortness of breath.    Cardiovascular: Negative for chest pain and palpitations.   Gastrointestinal: Negative for constipation, diarrhea, nausea and vomiting.   Neurological: Negative for dizziness, light-headedness and headaches.        Objective:      Physical Exam   Constitutional: She is oriented to person, place, and time. She appears well-developed and well-nourished. No distress.   Heart regular rate with irregular irregular rhythm  Fairly good blood pressure control  Normal weight with a BMI of 24.1 she is up 3.8 lb from her last visit with tabatha Barrera October 4, 2019   HENT:   Head: Normocephalic and atraumatic.   Eyes: Pupils are equal, round, and reactive to light. EOM are normal. No scleral icterus.   Neck: Normal range of motion. Neck supple. No JVD present. No tracheal deviation present. No thyromegaly present.   Cardiovascular: Normal rate and normal heart sounds. Exam reveals no gallop and no friction rub.   No murmur heard.  Pulmonary/Chest: Effort normal and breath sounds normal. No stridor. No respiratory distress. She has no wheezes. She has no rales. She exhibits no tenderness.   Lymphadenopathy:     She has no cervical adenopathy.   Neurological: She is alert and oriented to person, place, and time.   Skin: She is not diaphoretic.   Psychiatric: She has a normal mood and affect. Her behavior is normal. Judgment and thought content normal.   Nursing note and vitals reviewed.      Assessment:       1. Hypertension, essential    2. Vitamin D deficiency    3. Hypercalcemia    4. Acquired hypothyroidism        Plan:       1. Hypertension, essential  Fairly good control although somewhat erratic.  No changes needed, dosage of atenolol unknown  - Basic metabolic panel; Future    2. Vitamin D deficiency  Await lab results  - Vitamin D; Future    3. Hypercalcemia  Await lab results  - Basic metabolic panel; Future    4. Acquired hypothyroidism  Await lab results  - TSH; Future

## 2019-12-24 LAB
25(OH)D3+25(OH)D2 SERPL-MCNC: 34 NG/ML (ref 30–96)
T4 FREE SERPL-MCNC: 1.29 NG/DL (ref 0.71–1.51)
TSH SERPL DL<=0.005 MIU/L-ACNC: 0.17 UIU/ML (ref 0.4–4)

## 2019-12-26 ENCOUNTER — TELEPHONE (OUTPATIENT)
Dept: FAMILY MEDICINE | Facility: CLINIC | Age: 84
End: 2019-12-26

## 2019-12-26 DIAGNOSIS — E03.9 ACQUIRED HYPOTHYROIDISM: Primary | ICD-10-CM

## 2019-12-26 RX ORDER — LEVOTHYROXINE SODIUM 112 UG/1
56 TABLET ORAL DAILY
Qty: 45 TABLET | Refills: 0 | Status: SHIPPED | OUTPATIENT
Start: 2019-12-26 | End: 2020-03-09 | Stop reason: SDUPTHER

## 2019-12-26 NOTE — TELEPHONE ENCOUNTER
----- Message from Helen Gaston sent at 12/26/2019 11:42 AM CST -----  Type: Needs Medical Advice    Who Called:  pateitn   Best Call Back Number: [phone] (home)   Additional Information: patient sent a previous message today, she states has resolved the issue and to disregard the previous message

## 2019-12-26 NOTE — TELEPHONE ENCOUNTER
----- Message from Ava Ramirez MA sent at 12/26/2019 11:24 AM CST -----  Patient stated she has not been taking extra thyroid pills and doesn't think the pharmacy has given her a different brand, her thyroid pill is purple.

## 2019-12-26 NOTE — TELEPHONE ENCOUNTER
----- Message from Navin Barrera sent at 12/26/2019 11:37 AM CST -----  Type: Needs Medical Advice    Who Called:  Self   Symptoms (please be specific):  NA   How long has patient had these symptoms:  NA   Pharmacy name and phone #:    Best Call Back Number: [phone]  Additional Information: Patient asking to speak with the nurse.

## 2019-12-26 NOTE — TELEPHONE ENCOUNTER
Sent in prescription for Synthroid 112 mcg.  She is to take 1/2 pill daily, not a whole one.  TSH order in for two months

## 2020-01-02 ENCOUNTER — TELEPHONE (OUTPATIENT)
Dept: FAMILY MEDICINE | Facility: CLINIC | Age: 85
End: 2020-01-02

## 2020-01-02 NOTE — TELEPHONE ENCOUNTER
----- Message from Cornelius Rivera sent at 1/2/2020 10:12 AM CST -----  Contact: pt  Type:  Same Day Appointment Request    Caller is requesting a same day appointment.  Caller declined first available appointment listed below.      Name of Caller:  pt  When is the first available appointment?  1.10.20  Symptoms:  possible UTI  Best Call Back Number:  [phone]  Additional Information:   Pt requesting a call back to discuss

## 2020-01-02 NOTE — TELEPHONE ENCOUNTER
Patient states since changing thyroid dose her stomach is giving her problems- cannot describe- also made her have palpitations- she stopped it and went back to her old dose. No symptoms of uti. She wanted appt and one was made 1/7/20.

## 2020-01-03 ENCOUNTER — OFFICE VISIT (OUTPATIENT)
Dept: FAMILY MEDICINE | Facility: CLINIC | Age: 85
End: 2020-01-03
Payer: MEDICARE

## 2020-01-03 VITALS
BODY MASS INDEX: 23.68 KG/M2 | WEIGHT: 138.69 LBS | DIASTOLIC BLOOD PRESSURE: 84 MMHG | HEIGHT: 64 IN | TEMPERATURE: 98 F | SYSTOLIC BLOOD PRESSURE: 134 MMHG | HEART RATE: 80 BPM | RESPIRATION RATE: 18 BRPM | OXYGEN SATURATION: 98 %

## 2020-01-03 DIAGNOSIS — R39.15 URINARY URGENCY: ICD-10-CM

## 2020-01-03 DIAGNOSIS — N39.0 URINARY TRACT INFECTION WITHOUT HEMATURIA, SITE UNSPECIFIED: Primary | ICD-10-CM

## 2020-01-03 DIAGNOSIS — R30.0 DYSURIA: ICD-10-CM

## 2020-01-03 LAB
BILIRUB SERPL-MCNC: NEGATIVE MG/DL
BLOOD URINE, POC: ABNORMAL
COLOR, POC UA: YELLOW
GLUCOSE UR QL STRIP: NORMAL
KETONES UR QL STRIP: NEGATIVE
LEUKOCYTE ESTERASE URINE, POC: ABNORMAL
NITRITE, POC UA: NEGATIVE
PH, POC UA: 8
PROTEIN, POC: 30
SPECIFIC GRAVITY, POC UA: 1.01
UROBILINOGEN, POC UA: NORMAL

## 2020-01-03 PROCEDURE — 99214 OFFICE O/P EST MOD 30 MIN: CPT | Mod: S$PBB,,, | Performed by: PHYSICIAN ASSISTANT

## 2020-01-03 PROCEDURE — 87186 SC STD MICRODIL/AGAR DIL: CPT

## 2020-01-03 PROCEDURE — 99999 PR PBB SHADOW E&M-EST. PATIENT-LVL V: CPT | Mod: PBBFAC,,, | Performed by: PHYSICIAN ASSISTANT

## 2020-01-03 PROCEDURE — 1159F MED LIST DOCD IN RCRD: CPT | Mod: ,,, | Performed by: PHYSICIAN ASSISTANT

## 2020-01-03 PROCEDURE — 99999 PR PBB SHADOW E&M-EST. PATIENT-LVL V: ICD-10-PCS | Mod: PBBFAC,,, | Performed by: PHYSICIAN ASSISTANT

## 2020-01-03 PROCEDURE — 99214 PR OFFICE/OUTPT VISIT, EST, LEVL IV, 30-39 MIN: ICD-10-PCS | Mod: S$PBB,,, | Performed by: PHYSICIAN ASSISTANT

## 2020-01-03 PROCEDURE — 99215 OFFICE O/P EST HI 40 MIN: CPT | Mod: PBBFAC,PO | Performed by: PHYSICIAN ASSISTANT

## 2020-01-03 PROCEDURE — 87077 CULTURE AEROBIC IDENTIFY: CPT

## 2020-01-03 PROCEDURE — 1125F AMNT PAIN NOTED PAIN PRSNT: CPT | Mod: ,,, | Performed by: PHYSICIAN ASSISTANT

## 2020-01-03 PROCEDURE — 1125F PR PAIN SEVERITY QUANTIFIED, PAIN PRESENT: ICD-10-PCS | Mod: ,,, | Performed by: PHYSICIAN ASSISTANT

## 2020-01-03 PROCEDURE — 1159F PR MEDICATION LIST DOCUMENTED IN MEDICAL RECORD: ICD-10-PCS | Mod: ,,, | Performed by: PHYSICIAN ASSISTANT

## 2020-01-03 PROCEDURE — 81001 URINALYSIS AUTO W/SCOPE: CPT | Mod: PBBFAC,PO | Performed by: PHYSICIAN ASSISTANT

## 2020-01-03 PROCEDURE — 87086 URINE CULTURE/COLONY COUNT: CPT

## 2020-01-03 PROCEDURE — 87088 URINE BACTERIA CULTURE: CPT

## 2020-01-03 RX ORDER — CIPROFLOXACIN 250 MG/1
250 TABLET, FILM COATED ORAL 2 TIMES DAILY
Qty: 20 TABLET | Refills: 0 | Status: SHIPPED | OUTPATIENT
Start: 2020-01-03 | End: 2020-01-13

## 2020-01-03 RX ORDER — OXYBUTYNIN CHLORIDE 5 MG/1
5 TABLET, EXTENDED RELEASE ORAL DAILY
Qty: 90 TABLET | Refills: 3 | Status: SHIPPED | OUTPATIENT
Start: 2020-01-03 | End: 2020-06-02 | Stop reason: SDDI

## 2020-01-04 NOTE — PROGRESS NOTES
Subjective:       Patient ID: Ama Lang is a 89 y.o. female.    Chief Complaint: possible UTI (x 2 days )    HPI   Frequency and urgency  Last culture grew Klebsiella   Review of Systems   Constitutional: Positive for activity change, chills and fatigue. Negative for appetite change, diaphoresis, fever and unexpected weight change.   HENT: Negative.    Eyes: Negative.    Respiratory: Negative.  Negative for cough and shortness of breath.    Cardiovascular: Negative.  Negative for chest pain and leg swelling.   Gastrointestinal: Positive for abdominal pain.   Endocrine: Negative.    Genitourinary: Negative.    Musculoskeletal: Negative.    Skin: Negative.  Negative for rash.   Neurological: Negative.        Objective:      Physical Exam   Constitutional: She is oriented to person, place, and time. She appears well-developed and well-nourished. No distress.   HENT:   Head: Normocephalic and atraumatic.   Right Ear: External ear normal.   Left Ear: External ear normal.   Nose: Nose normal.   Mouth/Throat: Oropharynx is clear and moist. No oropharyngeal exudate.   Eyes: Conjunctivae are normal. No scleral icterus.   Neck: Normal range of motion. Neck supple. No tracheal deviation present. No thyromegaly present.   Cardiovascular: Normal rate, regular rhythm, normal heart sounds and intact distal pulses. Exam reveals no gallop and no friction rub.   No murmur heard.  Pulmonary/Chest: Effort normal and breath sounds normal. No stridor. No respiratory distress. She has no wheezes. She has no rales.   Abdominal: Soft. Bowel sounds are normal. She exhibits no distension and no mass. There is tenderness. There is no rebound and no guarding. No hernia.   Mid lower abd tenderness  No organomegaly   No CVA tenderness   Musculoskeletal: She exhibits no edema.   Lymphadenopathy:     She has no cervical adenopathy.   Neurological: She is alert and oriented to person, place, and time.   Skin: Skin is warm and dry. No rash  noted.   Vitals reviewed.      Assessment:       1. Urinary tract infection without hematuria, site unspecified    2. Dysuria    3. Urinary urgency        Plan:       Ama was seen today for possible uti.    Diagnoses and all orders for this visit:    Urinary tract infection without hematuria, site unspecified  -     Urine culture  -     POCT urinalysis, dipstick or tablet reag  -     ciprofloxacin HCl (CIPRO) 250 MG tablet; Take 1 tablet (250 mg total) by mouth 2 (two) times daily. for 10 days    Dysuria  -     oxybutynin (DITROPAN-XL) 5 MG TR24; Take 1 tablet (5 mg total) by mouth once daily.  -     ciprofloxacin HCl (CIPRO) 250 MG tablet; Take 1 tablet (250 mg total) by mouth 2 (two) times daily. for 10 days    Urinary urgency  -     oxybutynin (DITROPAN-XL) 5 MG TR24; Take 1 tablet (5 mg total) by mouth once daily.  -     ciprofloxacin HCl (CIPRO) 250 MG tablet; Take 1 tablet (250 mg total) by mouth 2 (two) times daily. for 10 days

## 2020-01-06 LAB — BACTERIA UR CULT: ABNORMAL

## 2020-02-04 DIAGNOSIS — J30.1 NON-SEASONAL ALLERGIC RHINITIS DUE TO POLLEN: ICD-10-CM

## 2020-02-04 RX ORDER — FLUTICASONE PROPIONATE 50 MCG
SPRAY, SUSPENSION (ML) NASAL
Qty: 16 G | Refills: 11 | Status: SHIPPED | OUTPATIENT
Start: 2020-02-04 | End: 2021-03-03

## 2020-02-21 ENCOUNTER — OFFICE VISIT (OUTPATIENT)
Dept: FAMILY MEDICINE | Facility: CLINIC | Age: 85
End: 2020-02-21
Payer: MEDICARE

## 2020-02-21 VITALS
OXYGEN SATURATION: 97 % | DIASTOLIC BLOOD PRESSURE: 62 MMHG | HEART RATE: 63 BPM | SYSTOLIC BLOOD PRESSURE: 126 MMHG | HEIGHT: 64 IN | BODY MASS INDEX: 23.41 KG/M2 | RESPIRATION RATE: 18 BRPM | TEMPERATURE: 98 F | WEIGHT: 137.13 LBS

## 2020-02-21 DIAGNOSIS — S60.00XA CONTUSION OF FINGER OF RIGHT HAND, INITIAL ENCOUNTER: Primary | ICD-10-CM

## 2020-02-21 PROCEDURE — 99215 OFFICE O/P EST HI 40 MIN: CPT | Mod: PBBFAC,PO | Performed by: PHYSICIAN ASSISTANT

## 2020-02-21 PROCEDURE — 99999 PR PBB SHADOW E&M-EST. PATIENT-LVL V: CPT | Mod: PBBFAC,,, | Performed by: PHYSICIAN ASSISTANT

## 2020-02-21 PROCEDURE — 99999 PR PBB SHADOW E&M-EST. PATIENT-LVL V: ICD-10-PCS | Mod: PBBFAC,,, | Performed by: PHYSICIAN ASSISTANT

## 2020-02-21 PROCEDURE — 99213 PR OFFICE/OUTPT VISIT, EST, LEVL III, 20-29 MIN: ICD-10-PCS | Mod: S$PBB,,, | Performed by: PHYSICIAN ASSISTANT

## 2020-02-21 PROCEDURE — 99213 OFFICE O/P EST LOW 20 MIN: CPT | Mod: S$PBB,,, | Performed by: PHYSICIAN ASSISTANT

## 2020-02-21 NOTE — PROGRESS NOTES
Subjective:       Patient ID: Ama Lang is a 89 y.o. female.    Chief Complaint: bruised hand (right hand )    HPI   Hit hand on corner of table  No skin break   Injured hand 4 or 5 days ago  Swelling and bruising still present  Pt on anticoagulants   Pt otherwise feels well  Review of Systems   Constitutional: Negative.  Negative for activity change, appetite change, chills, diaphoresis, fatigue, fever and unexpected weight change.   HENT: Negative.    Eyes: Negative.    Respiratory: Negative.  Negative for cough and shortness of breath.    Cardiovascular: Negative.  Negative for chest pain and leg swelling.   Gastrointestinal: Negative.    Endocrine: Negative.    Genitourinary: Negative.    Musculoskeletal: Negative.    Skin: Negative.  Negative for rash.   Neurological: Negative.        Objective:      Physical Exam   Constitutional: She is oriented to person, place, and time. She appears well-developed and well-nourished. No distress.   HENT:   Head: Normocephalic and atraumatic.   Eyes: Conjunctivae are normal. No scleral icterus.   Neck: Normal range of motion. Neck supple. No tracheal deviation present. No thyromegaly present.   Cardiovascular: Normal rate, regular rhythm, normal heart sounds and intact distal pulses. Exam reveals no gallop and no friction rub.   No murmur heard.  Pulmonary/Chest: Effort normal and breath sounds normal. No stridor. No respiratory distress. She has no wheezes. She has no rales.   Musculoskeletal: She exhibits edema.   Hematoma dorsum R hand approx 3x4 cm echemosis extends into digits and wrist area  Neurovascular intact   Lymphadenopathy:     She has no cervical adenopathy.   Neurological: She is alert and oriented to person, place, and time.   Skin: Skin is warm and dry.   Vitals reviewed.      Assessment:       1. Contusion of finger of right hand, initial encounter        Plan:       Ama was seen today for bruised hand.    Diagnoses and all orders for this  visit:    Contusion of finger of right hand, initial encounter    discussed home PT  reassurance

## 2020-02-29 ENCOUNTER — LAB VISIT (OUTPATIENT)
Dept: LAB | Facility: HOSPITAL | Age: 85
End: 2020-02-29
Attending: FAMILY MEDICINE
Payer: MEDICARE

## 2020-02-29 DIAGNOSIS — E03.9 ACQUIRED HYPOTHYROIDISM: ICD-10-CM

## 2020-02-29 LAB — TSH SERPL DL<=0.005 MIU/L-ACNC: 1.51 UIU/ML (ref 0.4–4)

## 2020-02-29 PROCEDURE — 84443 ASSAY THYROID STIM HORMONE: CPT

## 2020-02-29 PROCEDURE — 36415 COLL VENOUS BLD VENIPUNCTURE: CPT | Mod: PO

## 2020-03-06 ENCOUNTER — TELEPHONE (OUTPATIENT)
Dept: FAMILY MEDICINE | Facility: CLINIC | Age: 85
End: 2020-03-06

## 2020-03-06 DIAGNOSIS — E03.9 ACQUIRED HYPOTHYROIDISM: ICD-10-CM

## 2020-03-06 NOTE — TELEPHONE ENCOUNTER
Patient notified results of tsh faxed as requested to Dr. Mullen. She needs a refill of the thyroid med.

## 2020-03-06 NOTE — TELEPHONE ENCOUNTER
----- Message from Milady James sent at 3/6/2020 10:12 AM CST -----  Contact: [place] pt  Type:  Test Results    Who Called:  Ama  Name of Test (Lab/Mammo/Etc):  Labs  Date of Test:  02/28/20  Ordering Provider:  Kirstin  Where the test was performed:  n/a  Best Call Back Number:  [phone]  Additional Information:  Pls call pt to adv

## 2020-03-09 RX ORDER — LEVOTHYROXINE SODIUM 112 UG/1
56 TABLET ORAL DAILY
Qty: 45 TABLET | Refills: 3 | Status: SHIPPED | OUTPATIENT
Start: 2020-03-09 | End: 2021-03-24

## 2020-03-09 NOTE — TELEPHONE ENCOUNTER
Patient notified to stay on current dose of thyroid medication 112 mcg half tablet daily and was sent to pharmacy.

## 2020-03-17 DIAGNOSIS — I10 ESSENTIAL HYPERTENSION: ICD-10-CM

## 2020-03-17 RX ORDER — LISINOPRIL 40 MG/1
40 TABLET ORAL DAILY
Qty: 90 TABLET | Refills: 1 | Status: SHIPPED | OUTPATIENT
Start: 2020-03-17 | End: 2020-08-26 | Stop reason: SDUPTHER

## 2020-05-11 DIAGNOSIS — J30.2 CHRONIC SEASONAL ALLERGIC RHINITIS: ICD-10-CM

## 2020-05-12 RX ORDER — MONTELUKAST SODIUM 10 MG/1
TABLET ORAL
Qty: 30 TABLET | Refills: 11 | OUTPATIENT
Start: 2020-05-12

## 2020-05-14 ENCOUNTER — NURSE TRIAGE (OUTPATIENT)
Dept: ADMINISTRATIVE | Facility: CLINIC | Age: 85
End: 2020-05-14

## 2020-05-14 NOTE — TELEPHONE ENCOUNTER
She wants to know how to protect herself from COVID virus if her daughter who lives with her has it.

## 2020-05-15 NOTE — TELEPHONE ENCOUNTER
Contacted pt again - has questions about COVID 19. Questions about safety if someone you live with might have virus. Reassurance and information given.   Reason for Disposition   COVID-19 Prevention and Healthy Living, questions about    Additional Information   Negative: SEVERE difficulty breathing (e.g., struggling for each breath, speaks in single words)   Negative: Difficult to awaken or acting confused (e.g., disoriented, slurred speech)   Negative: Bluish (or gray) lips or face now   Negative: Shock suspected (e.g., cold/pale/clammy skin, too weak to stand, low BP, rapid pulse)   Negative: Sounds like a life-threatening emergency to the triager   Negative: [1] COVID-19 suspected (e.g., cough, fever, shortness of breath) AND [2] mild symptoms AND [3] public health department recommends testing   Negative: [1] COVID-19 exposure AND [2] no symptoms   Negative: COVID-19 and Breastfeeding, questions about   Negative: SEVERE or constant chest pain (Exception: mild central chest pain, present only when coughing)   Negative: MODERATE difficulty breathing (e.g., speaks in phrases, SOB even at rest, pulse 100-120)   Negative: Patient sounds very sick or weak to the triager   Negative: MILD difficulty breathing (e.g., minimal/no SOB at rest, SOB with walking, pulse <100)   Negative: Chest pain   Negative: Fever > 103 F (39.4 C)   Negative: [1] Fever > 101 F (38.3 C) AND [2] age > 60   Negative: [1] Fever > 100.0 F (37.8 C) AND [2] bedridden (e.g., nursing home patient, CVA, chronic illness, recovering from surgery)   Negative: HIGH RISK patient (e.g., age > 64 years, diabetes, heart or lung disease, weak immune system)   Negative: Fever present > 3 days (72 hours)   Negative: [1] Fever returns after gone for over 24 hours AND [2] symptoms worse or not improved   Negative: [1] Continuous (nonstop) coughing interferes with work or school AND [2] no improvement using cough treatment per protocol    Negative: Cough present > 3 weeks   Negative: [1] COVID-19 infection diagnosed or suspected AND [2] mild symptoms (fever, cough) AND [3] no trouble breathing or other complications   Negative: COVID-19 Home Isolation, questions about    Protocols used: CORONAVIRUS (COVID-19) DIAGNOSED OR [id number]

## 2020-05-16 ENCOUNTER — TELEPHONE (OUTPATIENT)
Dept: FAMILY MEDICINE | Facility: CLINIC | Age: 85
End: 2020-05-16

## 2020-05-16 NOTE — TELEPHONE ENCOUNTER
----- Message from Mary Foss sent at 5/16/2020  7:23 AM CDT -----  Contact: Heather, daughter, [phone]  Patient has a virtual appointment scheduled at 12 pm today but daughter keeps getting an erros at the e-pre check questionnaire. Please advise.

## 2020-05-28 ENCOUNTER — DOCUMENTATION ONLY (OUTPATIENT)
Dept: FAMILY MEDICINE | Facility: CLINIC | Age: 85
End: 2020-05-28

## 2020-05-28 NOTE — PROGRESS NOTES
Pre-Visit Chart Review  For Appointment Scheduled on 6-2-20    Health Maintenance Due   Topic Date Due    TETANUS VACCINE  02/11/1946    Lipid Panel  05/17/2020

## 2020-06-01 ENCOUNTER — TELEPHONE (OUTPATIENT)
Dept: FAMILY MEDICINE | Facility: CLINIC | Age: 85
End: 2020-06-01

## 2020-06-02 ENCOUNTER — OFFICE VISIT (OUTPATIENT)
Dept: FAMILY MEDICINE | Facility: CLINIC | Age: 85
End: 2020-06-02
Attending: FAMILY MEDICINE
Payer: MEDICARE

## 2020-06-02 VITALS
WEIGHT: 144.19 LBS | TEMPERATURE: 98 F | OXYGEN SATURATION: 96 % | BODY MASS INDEX: 24.62 KG/M2 | HEART RATE: 73 BPM | HEIGHT: 64 IN | RESPIRATION RATE: 20 BRPM | SYSTOLIC BLOOD PRESSURE: 116 MMHG | DIASTOLIC BLOOD PRESSURE: 62 MMHG

## 2020-06-02 DIAGNOSIS — I48.20 CHRONIC ATRIAL FIBRILLATION: ICD-10-CM

## 2020-06-02 DIAGNOSIS — E78.49 OTHER HYPERLIPIDEMIA: ICD-10-CM

## 2020-06-02 DIAGNOSIS — I10 ESSENTIAL HYPERTENSION: ICD-10-CM

## 2020-06-02 DIAGNOSIS — E55.9 HYPOVITAMINOSIS D: ICD-10-CM

## 2020-06-02 DIAGNOSIS — J01.00 ACUTE NON-RECURRENT MAXILLARY SINUSITIS: Primary | ICD-10-CM

## 2020-06-02 DIAGNOSIS — E03.9 ACQUIRED HYPOTHYROIDISM: ICD-10-CM

## 2020-06-02 PROCEDURE — 99214 OFFICE O/P EST MOD 30 MIN: CPT | Mod: PBBFAC,PO | Performed by: FAMILY MEDICINE

## 2020-06-02 PROCEDURE — 99999 PR PBB SHADOW E&M-EST. PATIENT-LVL IV: ICD-10-PCS | Mod: PBBFAC,,, | Performed by: FAMILY MEDICINE

## 2020-06-02 PROCEDURE — 99214 OFFICE O/P EST MOD 30 MIN: CPT | Mod: S$PBB,,, | Performed by: FAMILY MEDICINE

## 2020-06-02 PROCEDURE — 99214 PR OFFICE/OUTPT VISIT, EST, LEVL IV, 30-39 MIN: ICD-10-PCS | Mod: S$PBB,,, | Performed by: FAMILY MEDICINE

## 2020-06-02 PROCEDURE — 99999 PR PBB SHADOW E&M-EST. PATIENT-LVL IV: CPT | Mod: PBBFAC,,, | Performed by: FAMILY MEDICINE

## 2020-06-02 RX ORDER — ASPIRIN 81 MG/1
81 TABLET ORAL DAILY
COMMUNITY
End: 2023-10-31

## 2020-06-02 RX ORDER — VITAMIN E 268 MG
400 CAPSULE ORAL DAILY
COMMUNITY

## 2020-06-02 RX ORDER — AMOXICILLIN 875 MG/1
875 TABLET, FILM COATED ORAL 2 TIMES DAILY
Qty: 14 TABLET | Refills: 0 | Status: SHIPPED | OUTPATIENT
Start: 2020-06-02 | End: 2020-06-09

## 2020-06-02 RX ORDER — OMEPRAZOLE 20 MG/1
20 TABLET, DELAYED RELEASE ORAL DAILY
COMMUNITY
End: 2020-10-13 | Stop reason: SDUPTHER

## 2020-06-02 NOTE — PATIENT INSTRUCTIONS
Controlling High Blood Pressure  High blood pressure (hypertension) is often called the silent killer. This is because many people who have it dont know it. High blood pressure is defined as 140/90 mm Hg or higher. Know your blood pressure and remember to check it regularly. Doing so can save your life. Here are some things you can do to help control your blood pressure.    Choose heart-healthy foods  · Select low-salt, low-fat foods. Limit sodium intake to 2,400 mg per day or the amount suggested by your healthcare provider.  · Limit canned, dried, cured, packaged, and fast foods. These can contain a lot of salt.  · Eat 8 to 10 servings of fruits and vegetables every day.  · Choose lean meats, fish, or chicken.  · Eat whole-grain pasta, brown rice, and beans.  · Eat 2 to 3 servings of low-fat or fat-free dairy products.  · Ask your doctor about the DASH eating plan. This plan helps reduce blood pressure.  · When you go to a restaurant, ask that your meal be prepared with no added salt.  Maintain a healthy weight  · Ask your healthcare provider how many calories to eat a day. Then stick to that number.  · Ask your healthcare provider what weight range is healthiest for you. If you are overweight, a weight loss of only 3% to 5% of your body weight can help lower blood pressure. Generally, a good weight loss goal is to lose 10% of your body weight in a year.  · Limit snacks and sweets.  · Get regular exercise.  Get up and get active  · Choose activities you enjoy. Find ones you can do with friends or family. This includes bicycling, dancing, walking, and jogging.  · Park farther away from building entrances.  · Use stairs instead of the elevator.  · When you can, walk or bike instead of driving.  · [place] leaves, garden, or do household repairs.  · Be active at a moderate to vigorous level of physical activity for at least 40 minutes for a minimum of 3 to 4 days a week.   Manage stress  · Make time to relax and enjoy  life. Find time to laugh.  · Communicate your concerns with your loved ones and your healthcare provider.  · Visit with family and friends, and keep up with hobbies.  Limit alcohol and quit smoking  · Men should have no more than 2 drinks per day.  · Women should have no more than 1 drink per day.  · Talk with your healthcare provider about quitting smoking. Smoking significantly increases your risk for heart disease and stroke. Ask your healthcare provider about community smoking cessation programs and other options.  Medicines  If lifestyle changes arent enough, your healthcare provider may prescribe high blood pressure medicine. Take all medicines as prescribed. If you have any questions about your medicines, ask your healthcare provider before stopping or changing them.   Date Last Reviewed: 4/27/2016  © 7695-8902 [employer]. [address] [place] [address], [place] [address]. All rights reserved. This information is not intended as a substitute for professional medical care. Always follow your healthcare professional's instructions.

## 2020-06-02 NOTE — PROGRESS NOTES
Subjective:       Patient ID: Ama Lang is a 89 y.o. female.    Chief Complaint: Follow-up    89-year-old female coming in for six month follow-up of chronic medical issues including hypertension, chronic atrial fibrillation, hyperlipidemia, hypothyroidism, vitamin-D deficiency.  Her main complaint at present is some maxillary pain in she thinks she has a sinus infection with fullness in her years and some facial pressure.  She does have some nasal congestion and a runny nose but no dizziness, lightheadedness and no fever chills.  Her chest is clear without cough or tightness.  She will be seeing Dr. Mullen in the near future and will try to get copies of her lab results to him.    Past Medical History:  No date: *Atrial fibrillation      Comment:  Dr. Mullen  No date: Cancer      Comment:  skin cancer arms   No date: Cataract  No date: Diverticulitis  No date: Hyperlipidemia  No date: Hypertension  No date: Hypothyroidism    Past Surgical History:  12/18/2003: COLONOSCOPY      Comment:  Dr. Santoyo  2008: CYSTOSCOPY      Comment:  Dr. Nguyen  No date: DILATION AND CURETTAGE OF UTERUS  No date: EYE SURGERY  08/2019: skin cancer bilateral arms      Comment:  Dr Soler skin cancer both arms   No date: TUBAL LIGATION    Review of patient's family history indicates:  Problem: Cancer      Relation: Mother          Age of Onset: (Not Specified)          Comment: ovarian  Problem: Heart disease      Relation: Father          Age of Onset: (Not Specified)  Problem: Román's disease      Relation: Father          Age of Onset: (Not Specified)  Problem: Macular degeneration      Relation: Father          Age of Onset: (Not Specified)  Problem: Heart disease      Relation: Brother          Age of Onset: (Not Specified)  Problem: Cancer      Relation: Sister          Age of Onset: (Not Specified)          Comment: blood  Problem: Cancer      Relation: Brother          Age of Onset: (Not Specified)  Problem:  Lexington's disease      Relation: Daughter          Age of Onset: (Not Specified)  Problem: Early death      Relation: Daughter          Age of Onset: (Not Specified)  Problem: Arthritis      Relation: Sister          Age of Onset: (Not Specified)          Comment: spine  Problem: Carpal tunnel syndrome      Relation: Son          Age of Onset: (Not Specified)  Problem: Hypertension      Relation: Son          Age of Onset: (Not Specified)  Problem: Fibromyalgia      Relation: Daughter          Age of Onset: (Not Specified)  Problem: Lexington's disease      Relation: Daughter          Age of Onset: (Not Specified)  Problem: Arthritis      Relation: Daughter          Age of Onset: (Not Specified)          Comment: x2  Problem: Kidney cancer      Relation: Neg Hx          Age of Onset: (Not Specified)  Problem: Prostate cancer      Relation: Neg Hx          Age of Onset: (Not Specified)  Problem: Urolithiasis      Relation: Neg Hx          Age of Onset: (Not Specified)    Social History    Tobacco Use      Smoking status: Never Smoker      Smokeless tobacco: Never Used    Alcohol use: No    Drug use: No    Current Outpatient Medications on File Prior to Visit:  ACETAMINOPHEN (TYLENOL 8 HOUR ORAL), Take 2 capsules by mouth daily as needed., Disp: , Rfl:   alendronate (FOSAMAX) 70 MG tablet, Take 1 tablet (70 mg total) by mouth every 7 days., Disp: 12 tablet, Rfl: 3  amLODIPine (NORVASC) 2.5 MG tablet, Take 2.5 mg by mouth once daily., Disp: , Rfl:   apixaban (ELIQUIS) 2.5 mg Tab, Take 2.5 mg by mouth 2 (two) times daily. , Disp: , Rfl:   aspirin (ECOTRIN) 81 MG EC tablet, Take 81 mg by mouth once daily., Disp: , Rfl:   atenolol (TENORMIN) 50 MG tablet, Take 1 tablet (50 mg total) by mouth 2 (two) times daily., Disp: 180 tablet, Rfl: 0  calcium carbonate/vitamin D3 (VITAMIN D-3 ORAL), Take 1 tablet by mouth once daily., Disp: , Rfl:   conjugated estrogens (PREMARIN) vaginal cream, Place vaginally. , Disp: , Rfl:    fluticasone propionate (FLONASE) 50 mcg/actuation nasal spray, SNIFF 2 SPRAYS INTO EACH NOSTRIL ONCE DAILY, Disp: 16 g, Rfl: 11  hydrocortisone 2.5 % cream, Apply topically 2 (two) times daily., Disp: , Rfl:   ketoconazole (NIZORAL) 2 % shampoo, APPLY TO AFFECTED AREA ON THE SKIN 3 TIMES A week, Disp: , Rfl: 5  levothyroxine (SYNTHROID) 112 MCG tablet, Take 0.5 tablets (56 mcg total) by mouth once daily., Disp: 45 tablet, Rfl: 3  lisinopriL (PRINIVIL,ZESTRIL) 40 MG tablet, Take 1 tablet (40 mg total) by mouth once daily., Disp: 90 tablet, Rfl: 1  multivitamin (ONE DAILY MULTIVITAMIN) per tablet, Take 1 tablet by mouth daily as needed. , Disp: , Rfl:   nystatin (MYCOSTATIN) cream, , Disp: , Rfl: 5  omeprazole (PRILOSEC OTC) 20 MG tablet, Take 20 mg by mouth once daily., Disp: , Rfl:   silver sulfADIAZINE 1% (SILVADENE) 1 % cream, as needed. , Disp: , Rfl: 1  simvastatin (ZOCOR) 20 MG tablet, TAKE 1 TABLET (20 MG TOTAL) BY MOUTH EVERY EVENING., Disp: 30 tablet, Rfl: 10  VIT C/VIT E/LUTEIN/MIN/OMEGA-3 (OCUVITE ORAL), Take 1 tablet by mouth once daily., Disp: , Rfl:   vitamin E 400 UNIT capsule, Take 400 Units by mouth once daily., Disp: , Rfl:   (DISCONTINUED) azelastine (ASTELIN) 137 mcg (0.1 %) nasal spray, SPRAY 1 SPRAY INTO EACH NOSTRIL TWICE A DAY (Patient not taking: Reported on 6/2/2020), Disp: 30 mL, Rfl: 1  (DISCONTINUED) loratadine (CLARITIN) 10 mg tablet, Take 1 tablet (10 mg total) by mouth once daily., Disp: , Rfl: 0  (DISCONTINUED) oxybutynin (DITROPAN-XL) 5 MG TR24, Take 1 tablet (5 mg total) by mouth once daily. (Patient not taking: Reported on 6/2/2020), Disp: 90 tablet, Rfl: 3  (DISCONTINUED) pantoprazole (PROTONIX) 40 MG tablet, Take 1 tablet (40 mg total) by mouth once daily. For acid indigestion in the morning (Patient not taking: Reported on 6/2/2020), Disp: 30 tablet, Rfl: 1  (DISCONTINUED) triamcinolone acetonide 0.025% (KENALOG) 0.025 % cream, 1 application 2 (two) times daily as needed.  Apply to affected area, Disp: , Rfl: 5    No current facility-administered medications on file prior to visit.     TETANUS VACCINE due on 02/11/1946  Shingles Vaccine(1 of 2) due on 02/11/1978  Lipid Panel due on 05/17/2020      Review of Systems   Constitutional: Positive for fatigue. Negative for chills, diaphoresis and fever.   HENT: Positive for congestion, hearing loss, postnasal drip, rhinorrhea, sinus pressure and sinus pain. Negative for ear discharge, ear pain, sneezing, sore throat and voice change.    Eyes: Negative for visual disturbance.   Respiratory: Negative for cough, chest tightness, shortness of breath and wheezing.    Cardiovascular: Negative for chest pain and palpitations.   Gastrointestinal: Negative for abdominal pain, constipation, diarrhea, nausea and vomiting.   Endocrine: Negative for cold intolerance, heat intolerance, polydipsia and polyuria.   Genitourinary: Negative for dysuria, frequency and hematuria.        Recently treated for urinary tract infection with unknown antibiotic at urgent care   Musculoskeletal: Negative for arthralgias and myalgias.   Skin: Negative for color change and rash.   Neurological: Negative for dizziness, light-headedness and headaches.   Psychiatric/Behavioral: Negative for dysphoric mood and sleep disturbance. The patient is not nervous/anxious.        Objective:      Physical Exam   Constitutional: She is oriented to person, place, and time. She appears well-developed and well-nourished. No distress.   Good blood pressure control  Normal weight with a BMI of 24.8 she is up 3.5 lb from her last visit with me December 23, 2019   HENT:   Head: Normocephalic and atraumatic.   Right Ear: External ear and ear canal normal. Tympanic membrane is injected and bulging. Tympanic membrane is not erythematous. Tympanic membrane mobility is abnormal. A middle ear effusion is present. Decreased hearing is noted.   Left Ear: External ear and ear canal normal. Tympanic  membrane is bulging. Tympanic membrane is not injected and not erythematous. Tympanic membrane mobility is abnormal.  No middle ear effusion. Decreased hearing is noted.   Nose: Mucosal edema and rhinorrhea present. Right sinus exhibits no maxillary sinus tenderness and no frontal sinus tenderness. Left sinus exhibits maxillary sinus tenderness. Left sinus exhibits no frontal sinus tenderness.   Mouth/Throat: Oropharynx is clear and moist. No oropharyngeal exudate, posterior oropharyngeal edema, posterior oropharyngeal erythema or tonsillar abscesses.   Left-sided hearing aid only   Eyes: Pupils are equal, round, and reactive to light. Conjunctivae and EOM are normal. No scleral icterus.   Neck: Normal range of motion. Neck supple. No JVD present. No tracheal deviation present. No thyromegaly present.   Cardiovascular: Normal rate and normal heart sounds. Exam reveals no gallop and no friction rub.   No murmur heard.  Irregular rate and rhythm with brief episodes of what appears to be sinus rhythm interspersed.  Overall rate controlled  No murmur gallop or rub audible  Carotid pulses 2+ without bruit   Pulmonary/Chest: Effort normal and breath sounds normal. No stridor. No respiratory distress. She has no wheezes. She has no rales. She exhibits no tenderness.   Abdominal: Soft. Bowel sounds are normal. She exhibits no distension and no mass. There is no tenderness. There is no rebound and no guarding. No hernia.   Lymphadenopathy:     She has no cervical adenopathy.   Neurological: She is alert and oriented to person, place, and time.   Skin: Skin is warm and dry. No rash noted. She is not diaphoretic. No erythema.   Psychiatric: She has a normal mood and affect. Her behavior is normal. Judgment and thought content normal.   Nursing note and vitals reviewed.      Assessment:       1. Acute non-recurrent maxillary sinusitis    2. Essential hypertension    3. Other hyperlipidemia    4. Chronic atrial fibrillation     5. Acquired hypothyroidism    6. Hypovitaminosis D    7. BMI 24.0-24.9, adult        Plan:       1. Acute non-recurrent maxillary sinusitis  Relatively mild with early right otitis, will use Amoxil.  She has had several rounds quinolones in the past months and it is unknown what she was given for her UTI several weeks ago.  - amoxicillin (AMOXIL) 875 MG tablet; Take 1 tablet (875 mg total) by mouth 2 (two) times daily. for 7 days  Dispense: 14 tablet; Refill: 0    2. Essential hypertension  Good control with no changes needed  - Basic metabolic panel; Future  - Lipid Panel; Future  - CBC auto differential; Future    3. Other hyperlipidemia  Lab Results   Component Value Date    CHOL 121 05/17/2019    CHOL 148 01/25/2018    CHOL 151 11/07/2016     Lab Results   Component Value Date    HDL 55 05/17/2019    HDL 65 01/25/2018    HDL 58 11/07/2016     Lab Results   Component Value Date    LDLCALC 55.2 (L) 05/17/2019    LDLCALC 70.0 01/25/2018    LDLCALC 73.4 11/07/2016     Lab Results   Component Value Date    TRIG 54 05/17/2019    TRIG 65 01/25/2018    TRIG 98 11/07/2016     Lab Results   Component Value Date    CHOLHDL 45.5 05/17/2019    CHOLHDL 43.9 01/25/2018    CHOLHDL 38.4 11/07/2016     Recheck lab to be scheduled, copies to Dr. Mullen  - Lipid Panel; Future    4. Chronic atrial fibrillation  Active atrial fibrillation with controlled rate, asymptomatic    5. Acquired hypothyroidism  Lab Results   Component Value Date    TSH 1.509 02/29/2020     Good control, no changes needed in dose    6. Hypovitaminosis D  Await lab results  - Vitamin D; Future    7. BMI 24.0-24.9, adult  Good weight, no changes needed

## 2020-06-08 ENCOUNTER — TELEPHONE (OUTPATIENT)
Dept: FAMILY MEDICINE | Facility: CLINIC | Age: 85
End: 2020-06-08

## 2020-06-08 NOTE — TELEPHONE ENCOUNTER
----- Message from Haley Dunaway sent at 6/8/2020  9:16 AM CDT -----  Contact: Pt  Type: Needs Medical Advice    Who Called:  Pt  Best Call Back Number: [phone]  Additional Information: Requesting a call back regarding pt postponing blood work due to pt is on antibiotics or is ok   Please Advise ---Thank you

## 2020-06-10 ENCOUNTER — LAB VISIT (OUTPATIENT)
Dept: LAB | Facility: HOSPITAL | Age: 85
End: 2020-06-10
Attending: FAMILY MEDICINE
Payer: MEDICARE

## 2020-06-10 DIAGNOSIS — I10 ESSENTIAL HYPERTENSION: ICD-10-CM

## 2020-06-10 DIAGNOSIS — D53.9 NUTRITIONAL ANEMIA: Primary | ICD-10-CM

## 2020-06-10 DIAGNOSIS — E78.49 OTHER HYPERLIPIDEMIA: ICD-10-CM

## 2020-06-10 DIAGNOSIS — E55.9 HYPOVITAMINOSIS D: ICD-10-CM

## 2020-06-10 LAB
25(OH)D3+25(OH)D2 SERPL-MCNC: 30 NG/ML (ref 30–96)
ANION GAP SERPL CALC-SCNC: 5 MMOL/L (ref 8–16)
BASOPHILS # BLD AUTO: 0.06 K/UL (ref 0–0.2)
BASOPHILS NFR BLD: 1.6 % (ref 0–1.9)
BUN SERPL-MCNC: 7 MG/DL (ref 10–30)
CALCIUM SERPL-MCNC: 9.7 MG/DL (ref 8.7–10.5)
CHLORIDE SERPL-SCNC: 104 MMOL/L (ref 95–110)
CHOLEST SERPL-MCNC: 141 MG/DL (ref 120–199)
CHOLEST/HDLC SERPL: 2.1 {RATIO} (ref 2–5)
CO2 SERPL-SCNC: 29 MMOL/L (ref 23–29)
CREAT SERPL-MCNC: 0.8 MG/DL (ref 0.5–1.4)
DIFFERENTIAL METHOD: ABNORMAL
EOSINOPHIL # BLD AUTO: 0.1 K/UL (ref 0–0.5)
EOSINOPHIL NFR BLD: 2.7 % (ref 0–8)
ERYTHROCYTE [DISTWIDTH] IN BLOOD BY AUTOMATED COUNT: 13.2 % (ref 11.5–14.5)
EST. GFR  (AFRICAN AMERICAN): >60 ML/MIN/1.73 M^2
EST. GFR  (NON AFRICAN AMERICAN): >60 ML/MIN/1.73 M^2
GLUCOSE SERPL-MCNC: 87 MG/DL (ref 70–110)
HCT VFR BLD AUTO: 35.8 % (ref 37–48.5)
HDLC SERPL-MCNC: 67 MG/DL (ref 40–75)
HDLC SERPL: 47.5 % (ref 20–50)
HGB BLD-MCNC: 11.5 G/DL (ref 12–16)
IMM GRANULOCYTES # BLD AUTO: 0.01 K/UL (ref 0–0.04)
IMM GRANULOCYTES NFR BLD AUTO: 0.3 % (ref 0–0.5)
LDLC SERPL CALC-MCNC: 63.2 MG/DL (ref 63–159)
LYMPHOCYTES # BLD AUTO: 1.4 K/UL (ref 1–4.8)
LYMPHOCYTES NFR BLD: 38.7 % (ref 18–48)
MCH RBC QN AUTO: 34.5 PG (ref 27–31)
MCHC RBC AUTO-ENTMCNC: 32.1 G/DL (ref 32–36)
MCV RBC AUTO: 108 FL (ref 82–98)
MONOCYTES # BLD AUTO: 0.5 K/UL (ref 0.3–1)
MONOCYTES NFR BLD: 13.6 % (ref 4–15)
NEUTROPHILS # BLD AUTO: 1.6 K/UL (ref 1.8–7.7)
NEUTROPHILS NFR BLD: 43.1 % (ref 38–73)
NONHDLC SERPL-MCNC: 74 MG/DL
NRBC BLD-RTO: 0 /100 WBC
PLATELET # BLD AUTO: 112 K/UL (ref 150–350)
PMV BLD AUTO: 14.5 FL (ref 9.2–12.9)
POTASSIUM SERPL-SCNC: 4.4 MMOL/L (ref 3.5–5.1)
RBC # BLD AUTO: 3.33 M/UL (ref 4–5.4)
SODIUM SERPL-SCNC: 138 MMOL/L (ref 136–145)
TRIGL SERPL-MCNC: 54 MG/DL (ref 30–150)
WBC # BLD AUTO: 3.67 K/UL (ref 3.9–12.7)

## 2020-06-10 PROCEDURE — 36415 COLL VENOUS BLD VENIPUNCTURE: CPT | Mod: PO

## 2020-06-10 PROCEDURE — 85025 COMPLETE CBC W/AUTO DIFF WBC: CPT

## 2020-06-10 PROCEDURE — 80061 LIPID PANEL: CPT

## 2020-06-10 PROCEDURE — 80048 BASIC METABOLIC PNL TOTAL CA: CPT

## 2020-06-10 PROCEDURE — 82306 VITAMIN D 25 HYDROXY: CPT

## 2020-08-06 ENCOUNTER — OFFICE VISIT (OUTPATIENT)
Dept: FAMILY MEDICINE | Facility: CLINIC | Age: 85
End: 2020-08-06
Attending: FAMILY MEDICINE
Payer: MEDICARE

## 2020-08-06 VITALS
WEIGHT: 144.38 LBS | HEART RATE: 66 BPM | SYSTOLIC BLOOD PRESSURE: 138 MMHG | TEMPERATURE: 98 F | OXYGEN SATURATION: 97 % | BODY MASS INDEX: 24.65 KG/M2 | RESPIRATION RATE: 16 BRPM | DIASTOLIC BLOOD PRESSURE: 70 MMHG | HEIGHT: 64 IN

## 2020-08-06 DIAGNOSIS — J01.01 ACUTE RECURRENT MAXILLARY SINUSITIS: ICD-10-CM

## 2020-08-06 DIAGNOSIS — N30.01 ACUTE CYSTITIS WITH HEMATURIA: Primary | ICD-10-CM

## 2020-08-06 LAB
BILIRUB SERPL-MCNC: ABNORMAL MG/DL
BLOOD URINE, POC: 250
CLARITY, POC UA: ABNORMAL
COLOR, POC UA: YELLOW
GLUCOSE UR QL STRIP: ABNORMAL
KETONES UR QL STRIP: ABNORMAL
LEUKOCYTE ESTERASE URINE, POC: ABNORMAL
NITRITE, POC UA: ABNORMAL
PH, POC UA: 5
PROTEIN, POC: ABNORMAL
SPECIFIC GRAVITY, POC UA: 1.02
UROBILINOGEN, POC UA: ABNORMAL

## 2020-08-06 PROCEDURE — 99215 OFFICE O/P EST HI 40 MIN: CPT | Mod: PBBFAC,PO | Performed by: FAMILY MEDICINE

## 2020-08-06 PROCEDURE — 99214 OFFICE O/P EST MOD 30 MIN: CPT | Mod: S$PBB,,, | Performed by: FAMILY MEDICINE

## 2020-08-06 PROCEDURE — 99214 PR OFFICE/OUTPT VISIT, EST, LEVL IV, 30-39 MIN: ICD-10-PCS | Mod: S$PBB,,, | Performed by: FAMILY MEDICINE

## 2020-08-06 PROCEDURE — 87086 URINE CULTURE/COLONY COUNT: CPT

## 2020-08-06 PROCEDURE — 99999 PR PBB SHADOW E&M-EST. PATIENT-LVL V: ICD-10-PCS | Mod: PBBFAC,,, | Performed by: FAMILY MEDICINE

## 2020-08-06 PROCEDURE — 87077 CULTURE AEROBIC IDENTIFY: CPT

## 2020-08-06 PROCEDURE — 99999 PR PBB SHADOW E&M-EST. PATIENT-LVL V: CPT | Mod: PBBFAC,,, | Performed by: FAMILY MEDICINE

## 2020-08-06 PROCEDURE — 87186 SC STD MICRODIL/AGAR DIL: CPT

## 2020-08-06 PROCEDURE — 87088 URINE BACTERIA CULTURE: CPT

## 2020-08-06 PROCEDURE — 81002 URINALYSIS NONAUTO W/O SCOPE: CPT | Mod: PBBFAC,PO | Performed by: FAMILY MEDICINE

## 2020-08-06 RX ORDER — MONTELUKAST SODIUM 10 MG/1
TABLET ORAL
COMMUNITY
Start: 2020-05-11 | End: 2020-11-12 | Stop reason: ALTCHOICE

## 2020-08-06 RX ORDER — NITROFURANTOIN 25; 75 MG/1; MG/1
CAPSULE ORAL
COMMUNITY
Start: 2020-05-16 | End: 2020-08-06 | Stop reason: ALTCHOICE

## 2020-08-06 RX ORDER — AMOXICILLIN AND CLAVULANATE POTASSIUM 875; 125 MG/1; MG/1
1 TABLET, FILM COATED ORAL 2 TIMES DAILY
Qty: 20 TABLET | Refills: 0 | Status: SHIPPED | OUTPATIENT
Start: 2020-08-06 | End: 2020-08-16

## 2020-08-06 RX ORDER — TRIAMCINOLONE ACETONIDE 0.25 MG/G
CREAM TOPICAL 2 TIMES DAILY
COMMUNITY
Start: 2020-07-14

## 2020-08-06 NOTE — PROGRESS NOTES
Subjective:       Patient ID: Ama Lang is a 89 y.o. female.    Chief Complaint: Urinary Tract Infection, Abdominal Pain, and Nausea    89-year-old female comes in with a 2 to 3 day history of lower abdominal and low back pain associated with some urinary frequency that started last night.  She has had some episodes of sweating with activity and also at night similar to what she experienced with menopause.  She also has been having pain in her left maxillary area that feels like a toothache and associated with some fullness in the left ear.  She does have a history of chronic recurrent sinus infections and was treated with Amoxil back in June.    Past Medical History:  No date: *Atrial fibrillation      Comment:  Dr. Mullen  No date: Cancer      Comment:  skin cancer arms   No date: Cataract  No date: Diverticulitis  No date: Hyperlipidemia  No date: Hypertension  No date: Hypothyroidism    Past Surgical History:  12/18/2003: COLONOSCOPY      Comment:  Dr. Santoyo  2008: CYSTOSCOPY      Comment:  Dr. Nguyen  No date: DILATION AND CURETTAGE OF UTERUS  No date: EYE SURGERY  08/2019: skin cancer bilateral arms      Comment:  Dr Soler skin cancer both arms   No date: TUBAL LIGATION    Current Outpatient Medications on File Prior to Visit:  ACETAMINOPHEN (TYLENOL 8 HOUR ORAL), Take 2 capsules by mouth daily as needed., Disp: , Rfl:   alendronate (FOSAMAX) 70 MG tablet, Take 1 tablet (70 mg total) by mouth every 7 days., Disp: 12 tablet, Rfl: 3  amLODIPine (NORVASC) 2.5 MG tablet, TAKE ONE TABLET BY MOUTH EVERY DAY, Disp: 90 tablet, Rfl: 3  apixaban (ELIQUIS) 2.5 mg Tab, Take 2.5 mg by mouth 2 (two) times daily. , Disp: , Rfl:   aspirin (ECOTRIN) 81 MG EC tablet, Take 81 mg by mouth once daily., Disp: , Rfl:   atenolol (TENORMIN) 50 MG tablet, Take 1 tablet (50 mg total) by mouth 2 (two) times daily., Disp: 180 tablet, Rfl: 0  conjugated estrogens (PREMARIN) vaginal cream, Place vaginally. , Disp: , Rfl:    fluticasone propionate (FLONASE) 50 mcg/actuation nasal spray, SNIFF 2 SPRAYS INTO EACH NOSTRIL ONCE DAILY, Disp: 16 g, Rfl: 11  hydrocortisone 2.5 % cream, Apply topically 2 (two) times daily., Disp: , Rfl:   ketoconazole (NIZORAL) 2 % shampoo, APPLY TO AFFECTED AREA ON THE SKIN 3 TIMES A week, Disp: , Rfl: 5  levothyroxine (SYNTHROID) 112 MCG tablet, Take 0.5 tablets (56 mcg total) by mouth once daily., Disp: 45 tablet, Rfl: 3  lisinopriL (PRINIVIL,ZESTRIL) 40 MG tablet, Take 1 tablet (40 mg total) by mouth once daily., Disp: 90 tablet, Rfl: 1  multivitamin (ONE DAILY MULTIVITAMIN) per tablet, Take 1 tablet by mouth daily as needed. , Disp: , Rfl:   omeprazole (PRILOSEC OTC) 20 MG tablet, Take 20 mg by mouth once daily., Disp: , Rfl:   simvastatin (ZOCOR) 20 MG tablet, TAKE 1 TABLET (20 MG TOTAL) BY MOUTH EVERY EVENING., Disp: 30 tablet, Rfl: 10  triamcinolone acetonide 0.025% (KENALOG) 0.025 % cream, , Disp: , Rfl:   VIT C/VIT E/LUTEIN/MIN/OMEGA-3 (OCUVITE ORAL), Take 1 tablet by mouth once daily., Disp: , Rfl:   vitamin E 400 UNIT capsule, Take 400 Units by mouth once daily., Disp: , Rfl:   montelukast (SINGULAIR) 10 mg tablet, , Disp: , Rfl:   (DISCONTINUED) calcium carbonate/vitamin D3 (VITAMIN D-3 ORAL), Take 1 tablet by mouth once daily., Disp: , Rfl:   (DISCONTINUED) nitrofurantoin, macrocrystal-monohydrate, (MACROBID) 100 MG capsule, , Disp: , Rfl:   (DISCONTINUED) nystatin (MYCOSTATIN) cream, , Disp: , Rfl: 5  (DISCONTINUED) silver sulfADIAZINE 1% (SILVADENE) 1 % cream, as needed. , Disp: , Rfl: 1    No current facility-administered medications on file prior to visit.         Review of Systems   Constitutional: Positive for diaphoresis. Negative for chills and fever.   HENT: Positive for congestion, sinus pressure and sinus pain.    Respiratory: Negative for cough, chest tightness and shortness of breath.    Genitourinary: Positive for frequency. Negative for dysuria and hematuria.   Musculoskeletal:  Positive for back pain.   Neurological: Positive for headaches. Negative for dizziness and light-headedness.       Objective:      Physical Exam  Vitals signs and nursing note reviewed.   Constitutional:       General: She is not in acute distress.     Appearance: Normal appearance. She is normal weight. She is not ill-appearing, toxic-appearing or diaphoretic.      Comments: Good blood pressure control  Afebrile  Normal respiration and oxygen saturation  Normal weight with a BMI of 24.8 she is up 1/10 of a lb since her June 2, 2020 visit   HENT:      Head: Normocephalic and atraumatic.      Right Ear: Tympanic membrane, ear canal and external ear normal. There is no impacted cerumen. Tympanic membrane has normal mobility.      Left Ear: Tympanic membrane, ear canal and external ear normal. There is no impacted cerumen. Tympanic membrane has normal mobility.      Nose:      Right Turbinates: Pale. Not enlarged or swollen.      Left Turbinates: Swollen. Not enlarged or pale.      Right Sinus: No maxillary sinus tenderness or frontal sinus tenderness.      Left Sinus: Maxillary sinus tenderness present. No frontal sinus tenderness.      Mouth/Throat:      Mouth: Mucous membranes are moist.      Pharynx: Oropharynx is clear. No oropharyngeal exudate or posterior oropharyngeal erythema.   Eyes:      General: No scleral icterus.     Extraocular Movements: Extraocular movements intact.      Pupils: Pupils are equal, round, and reactive to light.   Cardiovascular:      Rate and Rhythm: Normal rate and regular rhythm.      Heart sounds: Normal heart sounds. No murmur. No friction rub. No gallop.    Pulmonary:      Effort: Pulmonary effort is normal. No respiratory distress.      Breath sounds: Normal breath sounds. No stridor. No wheezing or rhonchi.   Neurological:      Mental Status: She is alert.         Assessment:       1. Acute cystitis with hematuria    2. Acute recurrent maxillary sinusitis        Plan:       1.  Acute cystitis with hematuria  Urine is positive for leukocytes and blood with a trace amount of protein culture has been ordered and is pending  - POCT URINE DIPSTICK WITHOUT MICROSCOPE  - Urine culture; Future  - amoxicillin-clavulanate 875-125mg (AUGMENTIN) 875-125 mg per tablet; Take 1 tablet by mouth 2 (two) times daily. for 10 days  Dispense: 20 tablet; Refill: 0  - Urine culture    2. Acute recurrent maxillary sinusitis  - amoxicillin-clavulanate 875-125mg (AUGMENTIN) 875-125 mg per tablet; Take 1 tablet by mouth 2 (two) times daily. for 10 days  Dispense: 20 tablet; Refill: 0

## 2020-08-08 LAB — BACTERIA UR CULT: ABNORMAL

## 2020-08-17 ENCOUNTER — TELEPHONE (OUTPATIENT)
Dept: FAMILY MEDICINE | Facility: CLINIC | Age: 85
End: 2020-08-17

## 2020-08-17 DIAGNOSIS — N30.00 ACUTE CYSTITIS WITHOUT HEMATURIA: Primary | ICD-10-CM

## 2020-08-17 NOTE — TELEPHONE ENCOUNTER
----- Message from Ivanna Sena MA sent at 8/17/2020 11:41 AM CDT -----  Patient notified.  She states she finished the antibiotic yesterday and she is feeling fine.  She denies any symptoms.

## 2020-08-18 NOTE — TELEPHONE ENCOUNTER
Spoke to patient- she will call family member for transportation this week to do ua. Order is in.

## 2020-08-20 ENCOUNTER — LAB VISIT (OUTPATIENT)
Dept: LAB | Facility: HOSPITAL | Age: 85
End: 2020-08-20
Attending: FAMILY MEDICINE
Payer: MEDICARE

## 2020-08-20 DIAGNOSIS — N30.00 ACUTE CYSTITIS WITHOUT HEMATURIA: ICD-10-CM

## 2020-08-20 LAB
BILIRUB UR QL STRIP: NEGATIVE
CLARITY UR: CLEAR
COLOR UR: YELLOW
GLUCOSE UR QL STRIP: NEGATIVE
HGB UR QL STRIP: NEGATIVE
KETONES UR QL STRIP: NEGATIVE
LEUKOCYTE ESTERASE UR QL STRIP: NEGATIVE
NITRITE UR QL STRIP: NEGATIVE
PH UR STRIP: 6 [PH] (ref 5–8)
PROT UR QL STRIP: NEGATIVE
SP GR UR STRIP: 1 (ref 1–1.03)
URN SPEC COLLECT METH UR: NORMAL

## 2020-08-20 PROCEDURE — 81002 URINALYSIS NONAUTO W/O SCOPE: CPT | Mod: PO

## 2020-08-26 ENCOUNTER — TELEPHONE (OUTPATIENT)
Dept: FAMILY MEDICINE | Facility: CLINIC | Age: 85
End: 2020-08-26

## 2020-08-26 ENCOUNTER — OFFICE VISIT (OUTPATIENT)
Dept: FAMILY MEDICINE | Facility: CLINIC | Age: 85
End: 2020-08-26
Payer: MEDICARE

## 2020-08-26 VITALS
HEART RATE: 83 BPM | OXYGEN SATURATION: 96 % | DIASTOLIC BLOOD PRESSURE: 84 MMHG | SYSTOLIC BLOOD PRESSURE: 136 MMHG | BODY MASS INDEX: 24.5 KG/M2 | WEIGHT: 143.5 LBS | TEMPERATURE: 98 F | HEIGHT: 64 IN

## 2020-08-26 DIAGNOSIS — M81.0 AGE-RELATED OSTEOPOROSIS WITHOUT CURRENT PATHOLOGICAL FRACTURE: ICD-10-CM

## 2020-08-26 DIAGNOSIS — I10 ESSENTIAL HYPERTENSION: ICD-10-CM

## 2020-08-26 DIAGNOSIS — R30.0 DYSURIA: ICD-10-CM

## 2020-08-26 DIAGNOSIS — E78.2 MIXED HYPERLIPIDEMIA: ICD-10-CM

## 2020-08-26 DIAGNOSIS — N30.01 ACUTE CYSTITIS WITH HEMATURIA: Primary | ICD-10-CM

## 2020-08-26 LAB
BILIRUB SERPL-MCNC: NEGATIVE MG/DL
BLOOD URINE, POC: POSITIVE
CLARITY, POC UA: CLEAR
COLOR, POC UA: YELLOW
GLUCOSE UR QL STRIP: NORMAL
KETONES UR QL STRIP: NEGATIVE
LEUKOCYTE ESTERASE URINE, POC: POSITIVE
NITRITE, POC UA: NEGATIVE
PH, POC UA: 6
PROTEIN, POC: ABNORMAL
SPECIFIC GRAVITY, POC UA: 1
UROBILINOGEN, POC UA: NORMAL

## 2020-08-26 PROCEDURE — 81002 URINALYSIS NONAUTO W/O SCOPE: CPT | Mod: PBBFAC,PO | Performed by: NURSE PRACTITIONER

## 2020-08-26 PROCEDURE — 99999 PR PBB SHADOW E&M-EST. PATIENT-LVL V: CPT | Mod: PBBFAC,,, | Performed by: NURSE PRACTITIONER

## 2020-08-26 PROCEDURE — 99215 OFFICE O/P EST HI 40 MIN: CPT | Mod: PBBFAC,PO | Performed by: NURSE PRACTITIONER

## 2020-08-26 PROCEDURE — 99214 PR OFFICE/OUTPT VISIT, EST, LEVL IV, 30-39 MIN: ICD-10-PCS | Mod: S$PBB,,, | Performed by: NURSE PRACTITIONER

## 2020-08-26 PROCEDURE — 99999 PR PBB SHADOW E&M-EST. PATIENT-LVL V: ICD-10-PCS | Mod: PBBFAC,,, | Performed by: NURSE PRACTITIONER

## 2020-08-26 PROCEDURE — 99214 OFFICE O/P EST MOD 30 MIN: CPT | Mod: S$PBB,,, | Performed by: NURSE PRACTITIONER

## 2020-08-26 RX ORDER — LEVOFLOXACIN 750 MG/1
750 TABLET ORAL DAILY
Qty: 5 TABLET | Refills: 0 | Status: SHIPPED | OUTPATIENT
Start: 2020-08-26 | End: 2020-08-31

## 2020-08-26 RX ORDER — ALENDRONATE SODIUM 70 MG/1
70 TABLET ORAL
Qty: 12 TABLET | Refills: 3 | Status: SHIPPED | OUTPATIENT
Start: 2020-08-26 | End: 2022-09-06 | Stop reason: SDUPTHER

## 2020-08-26 RX ORDER — SIMVASTATIN 20 MG/1
20 TABLET, FILM COATED ORAL NIGHTLY
Qty: 30 TABLET | Refills: 10 | Status: SHIPPED | OUTPATIENT
Start: 2020-08-26 | End: 2021-09-08 | Stop reason: SDUPTHER

## 2020-08-26 RX ORDER — LISINOPRIL 40 MG/1
40 TABLET ORAL DAILY
Qty: 90 TABLET | Refills: 1 | Status: SHIPPED | OUTPATIENT
Start: 2020-08-26 | End: 2021-03-18

## 2020-08-26 NOTE — PATIENT INSTRUCTIONS
Understanding Urinary Tract Infections (UTIs)  Most UTIs are caused by bacteria, although they may also be caused by viruses or fungi. Bacteria from the bowel are the most common source of infection. The infection may start because of any of the following:  · Sexual activity. During sex, bacteria can travel from the penis, vagina, or rectum into the urethra.   · Bacteria on the skin outside the rectum may travel into the urethra. This is more common in women since the rectum and urethra are closer to each other than in men. Wiping from front to back after using the toilet and keeping the area clean can help prevent germs from getting to the urethra.  · Blockage of urine flow through the urinary tract. If urine sits too long, germs may start to grow out of control.      Parts of the urinary tract  The infection can occur in any part of the urinary tract.  · The kidneys collect and store urine.  · The ureters carry urine from the kidneys to the bladder.  · The bladder holds urine until you are ready to let it out.  · The urethra carries urine from the bladder out of the body. It is shorter in women, so bacteria can move through it more easily. The urethra is longer in men, so a UTI is less likely to reach the bladder or kidneys in men.  Date Last Reviewed: 1/1/2017  © 1990-6271 [employer]. [address] [place] [address], [place] [address]. All rights reserved. This information is not intended as a substitute for professional medical care. Always follow your healthcare professional's instructions.        Urinary Tract Infections in Women    Urinary tract infections (UTIs) are most often caused by bacteria (germs). These bacteria enter the urinary tract. The bacteria may come from outside the body. Or they may travel from the skin outside the rectum or vagina into the urethra. Female anatomy makes it easy for bacteria from the bowel to enter a womans urinary tract, which is the most common source of UTI. This  means women develop UTIs more often than men. Pain in or around the urinary tract is a common UTI symptom. But the only way to know for sure if you have a UTI for the healthcare provider to test your urine. The two tests that may be done are the urinalysis and urine culture.  Types of UTIs  · Cystitis: A bladder infection (cystitis) is the most common UTI in women. You may have urgent or frequent urination. You may also have pain, burning when you urinate, and bloody urine.  · Urethritis: This is an inflamed urethra, which is the tube that carries urine from the bladder to outside the body. You may have lower stomach or back pain. You may also have urgent or frequent urination.  · Pyelonephritis: This is a kidney infection. If not treated, it can be serious and damage your kidneys. In severe cases, you may be hospitalized. You may have a fever and lower back pain.  Medicines to treat a UTI  Most UTIs are treated with antibiotics. These kill the bacteria. The length of time you need to take them depends on the type of infection. It may be as short as 3 days. If you have repeated UTIs, a low-dose antibiotic may be needed for several months. Take antibiotics exactly as directed. Dont stop taking them until all of the medicine is gone. If you stop taking the antibiotic too soon, the infection may not go away, and you may develop a resistance to the antibiotic. This can make it much harder to treat.  Lifestyle changes to treat and prevent UTIs  The lifestyle changes below will help get rid of your UTI. They may also help prevent future UTIs.  · Drink plenty of fluids. This includes water, juice, or other caffeine-free drinks. Fluids help flush bacteria out of your body.  · Empty your bladder. Always empty your bladder when you feel the urge to urinate. And always urinate before going to sleep. Urine that stays in your bladder can lead to infection. Try to urinate before and after sex as well.  · Practice good personal  hygiene. Wipe yourself from front to back after using the toilet. This helps keep bacteria from getting into the urethra.  · Use condoms during sex. These help prevent UTIs caused by sexually transmitted bacteria. Also, avoid using spermicides during sex. These can increase the risk of UTIs. Choose other forms of birth control instead. For women who tend to get UTIs after sex, a low-dose of a preventive antibiotic may be used. Be sure to discuss this option with your healthcare provider.  · Follow up with your healthcare provider as directed. He or she may test to make sure the infection has cleared. If needed, more treatment may be started.  Date Last Reviewed: 1/1/2017  © 2301-3388 [employer]. [address] [place] [address], [place] [address]. All rights reserved. This information is not intended as a substitute for professional medical care. Always follow your healthcare professional's instructions.

## 2020-08-26 NOTE — PROGRESS NOTES
"Subjective:       Patient ID: Ama Lang is a 89 y.o. female presents to the clinic for recurrent UTI symptoms.  This patient is new to me.  She has had recurring UTIs in which the urine cultures grows Klebsiella.     Chief Complaint: Urinary Tract Infection and Abdominal Pain    Urinary Tract Infection   This is a recurrent problem. The current episode started 1 to 4 weeks ago. The problem occurs every urination. The problem has been unchanged. The quality of the pain is described as burning. The pain is at a severity of 6/10. The pain is moderate. There has been no fever. She is not sexually active. There is no history of pyelonephritis. Associated symptoms include flank pain, frequency, nausea and urgency. Pertinent negatives include no behavior changes, chills, discharge, hematuria, hesitancy, possible pregnancy, sweats, vomiting, weight loss, bubble bath use, constipation, rash or withholding. She has tried antibiotics for the symptoms. The treatment provided no relief. Her past medical history is significant for recurrent UTIs. There is no history of catheterization, diabetes insipidus, diabetes mellitus, genitourinary reflux, hypertension, kidney stones, a single kidney, STD, urinary stasis or a urological procedure.       Vitals:    08/26/20 1049   BP: 136/84   Pulse: 83   Temp: 97.8 °F (36.6 °C)   TempSrc: Skin   SpO2: 96%   Weight: 65.1 kg (143 lb 8.3 oz)   Height: 5' 4" (1.626 m)   PainSc:   7   PainLoc: Abdomen     Body mass index is 24.64 kg/m².    Past Medical History:   Diagnosis Date    *Atrial fibrillation     Dr. Mullen    Cancer     skin cancer arms     Cataract     Diverticulitis     Hyperlipidemia     Hypertension     Hypothyroidism      Past Surgical History:   Procedure Laterality Date    COLONOSCOPY  12/18/2003    Dr. Santoyo    CYSTOSCOPY  2008    Dr. Nguyen    DILATION AND CURETTAGE OF UTERUS      EYE SURGERY      skin cancer bilateral arms  08/2019    Dr Soler skin cancer " both arms     TUBAL LIGATION       Social History     Socioeconomic History    Marital status:      Spouse name: Not on file    Number of children: Not on file    Years of education: Not on file    Highest education level: Not on file   Occupational History    Not on file   Social Needs    Financial resource strain: Not on file    Food insecurity     Worry: Not on file     Inability: Not on file    Transportation needs     Medical: Not on file     Non-medical: Not on file   Tobacco Use    Smoking status: Never Smoker    Smokeless tobacco: Never Used   Substance and Sexual Activity    Alcohol use: No    Drug use: No    Sexual activity: Not on file   Lifestyle    Physical activity     Days per week: Not on file     Minutes per session: Not on file    Stress: Not on file   Relationships    Social connections     Talks on phone: Not on file     Gets together: Not on file     Attends L[employer]-day service: Not on file     Active member of club or organization: Not on file     Attends meetings of clubs or organizations: Not on file     Relationship status: Not on file   Other Topics Concern    Not on file   Social History Narrative    Not on file       Review of patient's allergies indicates:   Allergen Reactions    Montelukast Other (See Comments)     Depleted sodium     Hydrochlorothiazide Other (See Comments)     Low sodium       Current Outpatient Medications:     ACETAMINOPHEN (TYLENOL 8 HOUR ORAL), Take 2 capsules by mouth daily as needed., Disp: , Rfl:     alendronate (FOSAMAX) 70 MG tablet, Take 1 tablet (70 mg total) by mouth every 7 days., Disp: 12 tablet, Rfl: 3    amLODIPine (NORVASC) 2.5 MG tablet, TAKE ONE TABLET BY MOUTH EVERY DAY, Disp: 90 tablet, Rfl: 3    apixaban (ELIQUIS) 2.5 mg Tab, Take 2.5 mg by mouth 2 (two) times daily. , Disp: , Rfl:     aspirin (ECOTRIN) 81 MG EC tablet, Take 81 mg by mouth once daily., Disp: , Rfl:     atenolol (TENORMIN) 50 MG tablet, Take 1  tablet (50 mg total) by mouth 2 (two) times daily., Disp: 180 tablet, Rfl: 0    conjugated estrogens (PREMARIN) vaginal cream, Place vaginally. , Disp: , Rfl:     fluticasone propionate (FLONASE) 50 mcg/actuation nasal spray, SNIFF 2 SPRAYS INTO EACH NOSTRIL ONCE DAILY, Disp: 16 g, Rfl: 11    hydrocortisone 2.5 % cream, Apply topically 2 (two) times daily., Disp: , Rfl:     ketoconazole (NIZORAL) 2 % shampoo, APPLY TO AFFECTED AREA ON THE SKIN 3 TIMES A week, Disp: , Rfl: 5    levothyroxine (SYNTHROID) 112 MCG tablet, Take 0.5 tablets (56 mcg total) by mouth once daily., Disp: 45 tablet, Rfl: 3    lisinopriL (PRINIVIL,ZESTRIL) 40 MG tablet, Take 1 tablet (40 mg total) by mouth once daily., Disp: 90 tablet, Rfl: 1    montelukast (SINGULAIR) 10 mg tablet, , Disp: , Rfl:     multivitamin (ONE DAILY MULTIVITAMIN) per tablet, Take 1 tablet by mouth daily as needed. , Disp: , Rfl:     omeprazole (PRILOSEC OTC) 20 MG tablet, Take 20 mg by mouth once daily., Disp: , Rfl:     simvastatin (ZOCOR) 20 MG tablet, Take 1 tablet (20 mg total) by mouth every evening., Disp: 30 tablet, Rfl: 10    triamcinolone acetonide 0.025% (KENALOG) 0.025 % cream, , Disp: , Rfl:     VIT C/VIT E/LUTEIN/MIN/OMEGA-3 (OCUVITE ORAL), Take 1 tablet by mouth once daily., Disp: , Rfl:     vitamin E 400 UNIT capsule, Take 400 Units by mouth once daily., Disp: , Rfl:     levoFLOXacin (LEVAQUIN) 750 MG tablet, Take 1 tablet (750 mg total) by mouth once daily. for 5 days, Disp: 5 tablet, Rfl: 0    Review of Systems   Constitutional: Negative for activity change, appetite change, chills, diaphoresis, fatigue, fever, unexpected weight change and weight loss.   HENT: Negative for congestion, ear discharge, ear pain, hearing loss, postnasal drip, rhinorrhea, sinus pressure, sinus pain, sore throat, trouble swallowing and voice change.    Eyes: Negative for photophobia, pain, discharge and visual disturbance.   Respiratory: Negative for apnea,  cough, chest tightness, shortness of breath and wheezing.    Cardiovascular: Negative for chest pain, palpitations and leg swelling.   Gastrointestinal: Positive for nausea. Negative for abdominal distention, abdominal pain, constipation, diarrhea and vomiting.   Endocrine: Negative for polydipsia, polyphagia and polyuria.   Genitourinary: Positive for flank pain, frequency and urgency. Negative for decreased urine volume, difficulty urinating, dyspareunia, dysuria, enuresis, genital sores, hematuria, hesitancy, menstrual problem, pelvic pain, vaginal bleeding, vaginal discharge and vaginal pain.   Musculoskeletal: Negative for arthralgias, back pain, gait problem, joint swelling and neck pain.   Skin: Negative for color change, pallor, rash and wound.   Neurological: Negative for dizziness, facial asymmetry, speech difficulty, weakness, light-headedness, numbness and headaches.   Hematological: Negative for adenopathy.   Psychiatric/Behavioral: Negative for agitation, behavioral problems, confusion, decreased concentration, hallucinations and sleep disturbance. The patient is not nervous/anxious.        Objective:      Physical Exam  Vitals signs and nursing note reviewed.   Constitutional:       General: She is not in acute distress.     Appearance: She is well-developed. She is not diaphoretic.   HENT:      Head: Normocephalic and atraumatic.      Right Ear: External ear normal.      Left Ear: External ear normal.      Nose: Nose normal.      Mouth/Throat:      Pharynx: No oropharyngeal exudate.   Eyes:      General: No scleral icterus.        Right eye: No discharge.         Left eye: No discharge.      Conjunctiva/sclera: Conjunctivae normal.      Pupils: Pupils are equal, round, and reactive to light.   Neck:      Musculoskeletal: Normal range of motion and neck supple.      Thyroid: No thyromegaly.      Vascular: No JVD.      Trachea: No tracheal deviation.   Cardiovascular:      Rate and Rhythm: Normal rate  and regular rhythm.      Heart sounds: Normal heart sounds. No murmur. No friction rub. No gallop.    Pulmonary:      Effort: Pulmonary effort is normal. No respiratory distress.      Breath sounds: Normal breath sounds. No stridor. No wheezing or rales.   Chest:      Chest wall: No tenderness.   Abdominal:      General: Bowel sounds are normal. There is no distension.      Palpations: Abdomen is soft. There is no mass.      Tenderness: There is no abdominal tenderness. There is no right CVA tenderness, left CVA tenderness, guarding or rebound.      Hernia: No hernia is present.   Musculoskeletal: Normal range of motion.         General: No tenderness or deformity.   Lymphadenopathy:      Cervical: No cervical adenopathy.   Skin:     General: Skin is warm and dry.      Capillary Refill: Capillary refill takes less than 2 seconds.      Coloration: Skin is not pale.      Findings: No erythema or rash.   Neurological:      Mental Status: She is alert and oriented to person, place, and time.      Cranial Nerves: No cranial nerve deficit.      Sensory: No sensory deficit.      Motor: No abnormal muscle tone.      Coordination: Coordination normal.      Deep Tendon Reflexes: Reflexes normal.   Psychiatric:         Behavior: Behavior normal.         Thought Content: Thought content normal.         Judgment: Judgment normal.         Component 14:59   Color, UA Yellow    Spec Grav UA 1.005    pH, UA 6    WBC, UA positive    Nitrite, UA negative    Protein trace    Glucose, UA normal    Ketones, UA negative    Urobilinogen, UA normal    Bilirubin negative    Blood, UA positive    Clarity, UA Clear        Assessment:       1. Acute cystitis with hematuria    2. Dysuria    3. Essential hypertension    4. Mixed hyperlipidemia    5. Age-related osteoporosis without current pathological fracture        Plan:     Ama was seen today for urinary tract infection and abdominal pain. UTI culture grows Klebsiella and shows that growth  becomes resistant to different prescribed antibiotics.  I prescribed Levaquin for patient to take for 5 days.  If symptoms continues, patient will be sent to emergency department for IV antibiotics.     Diagnoses and all orders for this visit:    Acute cystitis with hematuria  -     levoFLOXacin (LEVAQUIN) 750 MG tablet; Take 1 tablet (750 mg total) by mouth once daily. for 5 days    Dysuria  -     POCT urine dipstick without microscope    Essential hypertension  -     lisinopriL (PRINIVIL,ZESTRIL) 40 MG tablet; Take 1 tablet (40 mg total) by mouth once daily.    Mixed hyperlipidemia  -     simvastatin (ZOCOR) 20 MG tablet; Take 1 tablet (20 mg total) by mouth every evening.    Age-related osteoporosis without current pathological fracture  -     alendronate (FOSAMAX) 70 MG tablet; Take 1 tablet (70 mg total) by mouth every 7 days.    Medications refilled.  Educated patient about probiotics.   Pt unable to eat yogurt   Patient education provided.  All questions and concerns addressed  Patient verbalizes understanding    Follow up in about 1 week (around 9/2/2020).

## 2020-08-26 NOTE — TELEPHONE ENCOUNTER
----- Message from Lore Glez sent at 8/26/2020  8:37 AM CDT -----  Contact: pt  Pt is wanting to come in today and be seen by the Dr or PA for UTI/up all night going to bathroom and hurting...[phone] (home)

## 2020-09-02 ENCOUNTER — OFFICE VISIT (OUTPATIENT)
Dept: FAMILY MEDICINE | Facility: CLINIC | Age: 85
End: 2020-09-02
Attending: FAMILY MEDICINE
Payer: MEDICARE

## 2020-09-02 VITALS
HEART RATE: 87 BPM | OXYGEN SATURATION: 95 % | TEMPERATURE: 97 F | BODY MASS INDEX: 24.32 KG/M2 | SYSTOLIC BLOOD PRESSURE: 126 MMHG | WEIGHT: 142.44 LBS | HEIGHT: 64 IN | DIASTOLIC BLOOD PRESSURE: 84 MMHG

## 2020-09-02 DIAGNOSIS — I10 ESSENTIAL HYPERTENSION: ICD-10-CM

## 2020-09-02 DIAGNOSIS — N30.01 ACUTE CYSTITIS WITH HEMATURIA: Primary | ICD-10-CM

## 2020-09-02 LAB
BILIRUB SERPL-MCNC: ABNORMAL MG/DL
BLOOD URINE, POC: ABNORMAL
CLARITY, POC UA: CLEAR
COLOR, POC UA: ABNORMAL
GLUCOSE UR QL STRIP: NORMAL
KETONES UR QL STRIP: ABNORMAL
LEUKOCYTE ESTERASE URINE, POC: ABNORMAL
NITRITE, POC UA: ABNORMAL
PH, POC UA: 6
PROTEIN, POC: ABNORMAL
SPECIFIC GRAVITY, POC UA: 1.01
UROBILINOGEN, POC UA: NORMAL

## 2020-09-02 PROCEDURE — 99999 PR PBB SHADOW E&M-EST. PATIENT-LVL V: ICD-10-PCS | Mod: PBBFAC,,, | Performed by: NURSE PRACTITIONER

## 2020-09-02 PROCEDURE — 99214 PR OFFICE/OUTPT VISIT, EST, LEVL IV, 30-39 MIN: ICD-10-PCS | Mod: S$PBB,,, | Performed by: NURSE PRACTITIONER

## 2020-09-02 PROCEDURE — 87086 URINE CULTURE/COLONY COUNT: CPT

## 2020-09-02 PROCEDURE — 99999 PR PBB SHADOW E&M-EST. PATIENT-LVL V: CPT | Mod: PBBFAC,,, | Performed by: NURSE PRACTITIONER

## 2020-09-02 PROCEDURE — 99214 OFFICE O/P EST MOD 30 MIN: CPT | Mod: S$PBB,,, | Performed by: NURSE PRACTITIONER

## 2020-09-02 PROCEDURE — 99215 OFFICE O/P EST HI 40 MIN: CPT | Mod: PBBFAC,PO | Performed by: NURSE PRACTITIONER

## 2020-09-02 PROCEDURE — 81002 URINALYSIS NONAUTO W/O SCOPE: CPT | Mod: PBBFAC,PO | Performed by: NURSE PRACTITIONER

## 2020-09-02 NOTE — PATIENT INSTRUCTIONS
AZO cranberry tablets with probiotics and vitamin C  Things you can do to help prevent urinary tract infections:   1. Probiotic daily (one with lactobacillus, cranberry and D-mannose preferably but not necessary) to help populate the vagina with good bacteria instead of bad bacteria   2. Drink more water (2 to 4 bottles) to dilute the bacteria and then   3. Urinate every 2 hours to rid the bladder of bacteria before they multiply and start to stick to your bladder walls and cause more symptoms and problems   4. Avoid pads if possible or change pads as often   5. Cranberry tablets 3x a day (or find a probiotic with this in it)   6. D mannose supplement once day to keep the bad bacteria from sticking to your bladder (find a probiotic with this in it)   7. Can consider hormone cream you place vaginally 2x a week ( if no history of heart attack, blood clots, cervical, uterine or ovarian cancer). This will help the good bacteria replenish in the vagina.

## 2020-09-02 NOTE — PROGRESS NOTES
Patient ID: Ama Lang is a 89 y.o. female.    Chief Complaint:   Follow-up    HPI   Ms. Lang presents to clinic for a 1 week follow up after being treated for a UTI. Patient reports completing Levaquin prescription as prescribed and tolerated well. Denies flank pain, abdominal pain, dysuria, frequency, nausea and urgency. She reports feeling much better after completing antibiotics.  Patient is new to me. Reviewed past medical and social history.    Past Medical History:   Diagnosis Date    *Atrial fibrillation     Dr. Mullen    Cancer     skin cancer arms     Cataract     Diverticulitis     Hyperlipidemia     Hypertension     Hypothyroidism      Past Surgical History:   Procedure Laterality Date    COLONOSCOPY  12/18/2003    Dr. Santoyo    CYSTOSCOPY  2008    Dr. Nguyen    DILATION AND CURETTAGE OF UTERUS      EYE SURGERY      skin cancer bilateral arms  08/2019    Dr Soler skin cancer both arms     TUBAL LIGATION       Current Outpatient Medications on File Prior to Visit   Medication Sig Dispense Refill    ACETAMINOPHEN (TYLENOL 8 HOUR ORAL) Take 2 capsules by mouth daily as needed.      alendronate (FOSAMAX) 70 MG tablet Take 1 tablet (70 mg total) by mouth every 7 days. 12 tablet 3    amLODIPine (NORVASC) 2.5 MG tablet TAKE ONE TABLET BY MOUTH EVERY DAY 90 tablet 3    apixaban (ELIQUIS) 2.5 mg Tab Take 2.5 mg by mouth 2 (two) times daily.       aspirin (ECOTRIN) 81 MG EC tablet Take 81 mg by mouth once daily.      atenolol (TENORMIN) 50 MG tablet Take 1 tablet (50 mg total) by mouth 2 (two) times daily. 180 tablet 0    conjugated estrogens (PREMARIN) vaginal cream Place vaginally.       fluticasone propionate (FLONASE) 50 mcg/actuation nasal spray SNIFF 2 SPRAYS INTO EACH NOSTRIL ONCE DAILY 16 g 11    hydrocortisone 2.5 % cream Apply topically 2 (two) times daily.      ketoconazole (NIZORAL) 2 % shampoo APPLY TO AFFECTED AREA ON THE SKIN 3 TIMES A week  5    levothyroxine  (SYNTHROID) 112 MCG tablet Take 0.5 tablets (56 mcg total) by mouth once daily. 45 tablet 3    lisinopriL (PRINIVIL,ZESTRIL) 40 MG tablet Take 1 tablet (40 mg total) by mouth once daily. 90 tablet 1    montelukast (SINGULAIR) 10 mg tablet       multivitamin (ONE DAILY MULTIVITAMIN) per tablet Take 1 tablet by mouth daily as needed.       omeprazole (PRILOSEC OTC) 20 MG tablet Take 20 mg by mouth once daily.      simvastatin (ZOCOR) 20 MG tablet Take 1 tablet (20 mg total) by mouth every evening. 30 tablet 10    triamcinolone acetonide 0.025% (KENALOG) 0.025 % cream       VIT C/VIT E/LUTEIN/MIN/OMEGA-3 (OCUVITE ORAL) Take 1 tablet by mouth once daily.      vitamin E 400 UNIT capsule Take 400 Units by mouth once daily.       No current facility-administered medications on file prior to visit.        Review of Systems   Constitutional: Negative for appetite change, chills, fatigue and fever.   HENT: Negative for congestion, ear pain, sinus pain, sore throat and tinnitus.    Eyes: Negative for pain and visual disturbance.   Respiratory: Negative for cough, shortness of breath and wheezing.    Cardiovascular: Negative for chest pain and palpitations.   Gastrointestinal: Negative for abdominal distention, blood in stool, diarrhea, nausea and vomiting.   Endocrine: Negative for polydipsia, polyphagia and polyuria.   Genitourinary: Negative for difficulty urinating, dysuria, hematuria, menstrual problem, pelvic pain, urgency, vaginal bleeding, vaginal discharge and vaginal pain.   Musculoskeletal: Negative for back pain, gait problem and neck pain.   Skin: Negative for rash and wound.   Neurological: Negative for dizziness, seizures, syncope, weakness, numbness and headaches.   Hematological: Negative for adenopathy.   Psychiatric/Behavioral: Negative for confusion, sleep disturbance and suicidal ideas.   All other systems reviewed and are negative.      Objective:      Physical Exam  Vitals signs reviewed.    Constitutional:       Appearance: Normal appearance. She is well-developed.   HENT:      Head: Normocephalic and atraumatic.   Eyes:      Conjunctiva/sclera: Conjunctivae normal.      Pupils: Pupils are equal, round, and reactive to light.   Neck:      Musculoskeletal: Normal range of motion and neck supple.   Cardiovascular:      Rate and Rhythm: Normal rate and regular rhythm.      Heart sounds: Normal heart sounds.   Pulmonary:      Effort: Pulmonary effort is normal.      Breath sounds: Normal breath sounds.   Abdominal:      General: Bowel sounds are normal.      Palpations: Abdomen is soft.   Musculoskeletal: Normal range of motion.   Skin:     General: Skin is warm and dry.   Neurological:      Mental Status: She is alert and oriented to person, place, and time.   Psychiatric:         Mood and Affect: Mood normal.         Behavior: Behavior normal.         Assessment:       1. Acute cystitis with hematuria    2. BMI 24.0-24.9, adult    3. Essential hypertension        Plan:       Ama was seen today for follow-up.    Diagnoses and all orders for this visit:    Acute cystitis with hematuria  -     POCT URINE DIPSTICK WITHOUT MICROSCOPE  -     Urine culture    BMI 24.0-24.9, adult  Stable. Continue current treatment plan    Essential hypertension  Stable. Continue current treatment plan    Patient education provided.  All questions and concerns addressed  RTC PRN and if symptoms worsen or fail to improve  Patient verbalizes understanding        Patient Instructions   AZO cranberry tablets with probiotics and vitamin C  Things you can do to help prevent urinary tract infections:   1. Probiotic daily (one with lactobacillus, cranberry and D-mannose preferably but not necessary) to help populate the vagina with good bacteria instead of bad bacteria   2. Drink more water (2 to 4 bottles) to dilute the bacteria and then   3. Urinate every 2 hours to rid the bladder of bacteria before they multiply and start to  stick to your bladder walls and cause more symptoms and problems   4. Avoid pads if possible or change pads as often   5. Cranberry tablets 3x a day (or find a probiotic with this in it)   6. D mannose supplement once day to keep the bad bacteria from sticking to your bladder (find a probiotic with this in it)   7. Can consider hormone cream you place vaginally 2x a week ( if no history of heart attack, blood clots, cervical, uterine or ovarian cancer). This will help the good bacteria replenish in the vagina.

## 2020-09-03 LAB — BACTERIA UR CULT: NO GROWTH

## 2020-09-04 ENCOUNTER — TELEPHONE (OUTPATIENT)
Dept: FAMILY MEDICINE | Facility: CLINIC | Age: 85
End: 2020-09-04

## 2020-09-04 NOTE — TELEPHONE ENCOUNTER
----- Message from Bettie Brarera DNP sent at 9/4/2020  8:18 AM CDT -----  Urine did not grow any bacteria. How are you feeling?

## 2020-09-04 NOTE — TELEPHONE ENCOUNTER
Spoke to patient regarding urine culture. Patient voiced understanding of results and also states that she is feeling a lot better.

## 2020-10-12 ENCOUNTER — OFFICE VISIT (OUTPATIENT)
Dept: FAMILY MEDICINE | Facility: CLINIC | Age: 85
End: 2020-10-12
Payer: MEDICARE

## 2020-10-12 VITALS
HEART RATE: 70 BPM | WEIGHT: 136.69 LBS | SYSTOLIC BLOOD PRESSURE: 130 MMHG | OXYGEN SATURATION: 95 % | BODY MASS INDEX: 23.34 KG/M2 | HEIGHT: 64 IN | DIASTOLIC BLOOD PRESSURE: 80 MMHG | RESPIRATION RATE: 18 BRPM | TEMPERATURE: 98 F

## 2020-10-12 DIAGNOSIS — J01.10 ACUTE NON-RECURRENT FRONTAL SINUSITIS: ICD-10-CM

## 2020-10-12 DIAGNOSIS — R10.84 GENERALIZED ABDOMINAL PAIN: ICD-10-CM

## 2020-10-12 DIAGNOSIS — R30.0 DYSURIA: ICD-10-CM

## 2020-10-12 DIAGNOSIS — I10 ESSENTIAL HYPERTENSION: ICD-10-CM

## 2020-10-12 DIAGNOSIS — N39.0 URINARY TRACT INFECTION WITHOUT HEMATURIA, SITE UNSPECIFIED: Primary | ICD-10-CM

## 2020-10-12 PROCEDURE — 99215 OFFICE O/P EST HI 40 MIN: CPT | Mod: PBBFAC,PO | Performed by: FAMILY MEDICINE

## 2020-10-12 PROCEDURE — 99999 PR PBB SHADOW E&M-EST. PATIENT-LVL V: ICD-10-PCS | Mod: PBBFAC,,, | Performed by: FAMILY MEDICINE

## 2020-10-12 PROCEDURE — 99999 PR PBB SHADOW E&M-EST. PATIENT-LVL V: CPT | Mod: PBBFAC,,, | Performed by: FAMILY MEDICINE

## 2020-10-12 PROCEDURE — 99214 OFFICE O/P EST MOD 30 MIN: CPT | Mod: S$PBB,,, | Performed by: FAMILY MEDICINE

## 2020-10-12 PROCEDURE — 99214 PR OFFICE/OUTPT VISIT, EST, LEVL IV, 30-39 MIN: ICD-10-PCS | Mod: S$PBB,,, | Performed by: FAMILY MEDICINE

## 2020-10-12 RX ORDER — AMOXICILLIN AND CLAVULANATE POTASSIUM 875; 125 MG/1; MG/1
1 TABLET, FILM COATED ORAL 2 TIMES DAILY
Qty: 14 TABLET | Refills: 0 | Status: SHIPPED | OUTPATIENT
Start: 2020-10-12 | End: 2020-10-19

## 2020-10-12 NOTE — PATIENT INSTRUCTIONS
Anatomy of the Female Urinary Tract  Your urinary tract helps get rid of urine (your bodys liquid waste). The kidneys collect chemicals and water your body doesnt need. This is turned into urine. Urine travels out of the kidneys through the ureters to the bladder. The bladder holds urine until youre ready to release it. The urethra carries urine from the bladder out of the body. The main sphincter muscle circles the mid-urethra.      Front view of female urinary tract.     Date Last Reviewed: 1/1/2017 © 2000-2017 [employer]. [address] [place] [address]. All rights reserved. This information is not intended as a substitute for professional medical care. Always follow your healthcare professional's instructions.        Urinary Tract Infections in Women    Urinary tract infections (UTIs) are most often caused by bacteria (germs). These bacteria enter the urinary tract. The bacteria may come from outside the body. Or they may travel from the skin outside the rectum or vagina into the urethra. Female anatomy makes it easy for bacteria from the bowel to enter a womans urinary tract, which is the most common source of UTI. This means women develop UTIs more often than men. Pain in or around the urinary tract is a common UTI symptom. But the only way to know for sure if you have a UTI for the healthcare provider to test your urine. The two tests that may be done are the urinalysis and urine culture.  Types of UTIs  · Cystitis: A bladder infection (cystitis) is the most common UTI in women. You may have urgent or frequent urination. You may also have pain, burning when you urinate, and bloody urine.  · Urethritis: This is an inflamed urethra, which is the tube that carries urine from the bladder to outside the body. You may have lower stomach or back pain. You may also have urgent or frequent urination.  · Pyelonephritis: This is a kidney infection. If not treated, it can be serious and  damage your kidneys. In severe cases, you may be hospitalized. You may have a fever and lower back pain.  Medicines to treat a UTI  Most UTIs are treated with antibiotics. These kill the bacteria. The length of time you need to take them depends on the type of infection. It may be as short as 3 days. If you have repeated UTIs, a low-dose antibiotic may be needed for several months. Take antibiotics exactly as directed. Dont stop taking them until all of the medicine is gone. If you stop taking the antibiotic too soon, the infection may not go away, and you may develop a resistance to the antibiotic. This can make it much harder to treat.  Lifestyle changes to treat and prevent UTIs  The lifestyle changes below will help get rid of your UTI. They may also help prevent future UTIs.  · Drink plenty of fluids. This includes water, juice, or other caffeine-free drinks. Fluids help flush bacteria out of your body.  · Empty your bladder. Always empty your bladder when you feel the urge to urinate. And always urinate before going to sleep. Urine that stays in your bladder can lead to infection. Try to urinate before and after sex as well.  · Practice good personal hygiene. Wipe yourself from front to back after using the toilet. This helps keep bacteria from getting into the urethra.  · Use condoms during sex. These help prevent UTIs caused by sexually transmitted bacteria. Also, avoid using spermicides during sex. These can increase the risk of UTIs. Choose other forms of birth control instead. For women who tend to get UTIs after sex, a low-dose of a preventive antibiotic may be used. Be sure to discuss this option with your healthcare provider.  · Follow up with your healthcare provider as directed. He or she may test to make sure the infection has cleared. If needed, more treatment may be started.  Date Last Reviewed: 1/1/2017  © 4047-2988 [employer]. [address] [place] [address], [place] [address]. All rights  reserved. This information is not intended as a substitute for professional medical care. Always follow your healthcare professional's instructions.        Sinusitis (Antibiotic Treatment)    The sinuses are air-filled spaces within the bones of the face. They connect to the inside of the nose. Sinusitis is an inflammation of the tissue lining the sinus cavity. Sinus inflammation can occur during a cold. It can also be due to allergies to pollens and other particles in the air. Sinusitis can cause symptoms of sinus congestion and fullness. A sinus infection causes fever, headache and facial pain. There is often green or yellow drainage from the nose or into the back of the throat (post-nasal drip). You have been given antibiotics to treat this condition.  Home care:  · Take the full course of antibiotics as instructed. Do not stop taking them, even if you feel better.  · Drink plenty of water, hot tea, and other liquids. This may help thin mucus. It also may promote sinus drainage.  · Heat may help soothe painful areas of the face. Use a towel soaked in hot water. Or,  the shower and direct the hot spray onto your face. Using a vaporizer along with a menthol rub at night may also help.   · An expectorant containing guaifenesin may help thin the mucus and promote drainage from the sinuses.  · Over-the-counter decongestants may be used unless a similar medicine was prescribed. Nasal sprays work the fastest. Use one that contains phenylephrine or oxymetazoline. First blow the nose gently. Then use the spray. Do not use these medicines more often than directed on the label or symptoms may get worse. You may also use tablets containing pseudoephedrine. Avoid products that combine ingredients, because side effects may be increased. Read labels. You can also ask the pharmacist for help. (NOTE: Persons with high blood pressure should not use decongestants. They can raise blood  pressure.)  · Over-the-counter antihistamines may help if allergies contributed to your sinusitis.    · Do not use nasal rinses or irrigation during an acute sinus infection, unless told to by your health care provider. Rinsing may spread the infection to other sinuses.  · Use acetaminophen or ibuprofen to control pain, unless another pain medicine was prescribed. (If you have chronic liver or kidney disease or ever had a stomach ulcer, talk with your doctor before using these medicines. Aspirin should never be used in anyone under 18 years of age who is ill with a fever. It may cause severe liver damage.)  · Don't smoke. This can worsen symptoms.  Follow-up care  Follow up with your healthcare provider or our staff if you are not improving within the next week.  When to seek medical advice  Call your healthcare provider if any of these occur:  · Facial pain or headache becoming more severe  · Stiff neck  · Unusual drowsiness or confusion  · Swelling of the forehead or eyelids  · Vision problems, including blurred or double vision  · Fever of 100.4ºF (38ºC) or higher, or as directed by your healthcare provider  · Seizure  · Breathing problems  · Symptoms not resolving within 10 days  Date Last Reviewed: 4/13/2015  © 2997-1000 [employer]. [address] [place] [address], [place] [address]. All rights reserved. This information is not intended as a substitute for professional medical care. Always follow your healthcare professional's instructions.

## 2020-10-12 NOTE — PROGRESS NOTES
Subjective:       Patient ID: Ama Lang is a 89 y.o. female.    Chief Complaint: Allergies, Abdominal Pain, and Urinary Tract Infection    This patient is new to me.  Mrs Serrato presents to the clinic today with complaints of abdominal pain, possible UTI, and allergies. Patient states she has sinus congestion and pain. Patient states this causes post nasal drip which makes her cough. Patient states she also has some mild abdominal pain. Patient states she wants to get her sinuses and bladder issues under control before she addresses the stomach pain.     Abdominal Pain  This is a new problem. The current episode started 1 to 4 weeks ago. The onset quality is undetermined. The problem occurs daily. The problem has been waxing and waning. The pain is located in the generalized abdominal region. The pain is mild. The quality of the pain is aching. The abdominal pain does not radiate. Associated symptoms include dysuria and headaches. Pertinent negatives include no constipation, diarrhea, fever, frequency, nausea or vomiting. Nothing aggravates the pain. The pain is relieved by nothing. She has tried nothing for the symptoms. Prior diagnostic workup includes ultrasound.   Urinary Tract Infection   This is a new problem. The current episode started in the past 7 days. The problem occurs every urination. The problem has been unchanged. The quality of the pain is described as aching and burning. The pain is moderate. There has been no fever. She is not sexually active. There is no history of pyelonephritis. Pertinent negatives include no behavior changes, chills, flank pain, frequency, nausea, vomiting, bubble bath use, constipation or rash. She has tried nothing for the symptoms. Her past medical history is significant for recurrent UTIs.   Sinusitis  This is a new problem. The current episode started 1 to 4 weeks ago. The problem is unchanged. There has been no fever. The pain is mild. Associated symptoms include  congestion, headaches and sinus pressure. Pertinent negatives include no chills, coughing, diaphoresis, ear pain, neck pain, shortness of breath, sneezing or sore throat. Past treatments include nothing.     Review of Systems   Constitutional: Negative for activity change, appetite change, chills, diaphoresis and fever.   HENT: Positive for congestion, postnasal drip and sinus pressure. Negative for ear pain, sneezing and sore throat.    Eyes: Negative for pain, discharge, redness and itching.   Respiratory: Negative for apnea, cough, chest tightness, shortness of breath and wheezing.    Cardiovascular: Negative for chest pain and leg swelling.   Gastrointestinal: Positive for abdominal pain. Negative for abdominal distention, constipation, diarrhea, nausea and vomiting.   Genitourinary: Positive for dysuria. Negative for difficulty urinating, flank pain and frequency.   Musculoskeletal: Negative for neck pain.   Skin: Negative for color change, rash and wound.   Neurological: Positive for headaches. Negative for dizziness.       Patient Active Problem List   Diagnosis    Hypothyroidism    Hyperlipidemia    Chronic atrial fibrillation    Renal mass, left    Hypovitaminosis D    Postmenopausal    Osteoporosis    Nodular thyroid disease    Essential hypertension    Carpal tunnel syndrome of right wrist    Primary hyperparathyroidism    BMI 23.0-23.9, adult    Proteinuria    Acid indigestion       Objective:      Physical Exam  Vitals signs reviewed.   Constitutional:       General: She is not in acute distress.     Appearance: Normal appearance. She is well-developed.   HENT:      Head: Normocephalic.      Right Ear: Tympanic membrane, ear canal and external ear normal.      Left Ear: Tympanic membrane, ear canal and external ear normal.      Ears:      Comments: Patient is hard of hearing and has a hearing aid in the left ear.     Nose:      Right Sinus: Frontal sinus tenderness present.      Left  Sinus: Frontal sinus tenderness present.      Mouth/Throat:      Lips: Pink.      Mouth: Mucous membranes are moist.      Pharynx: Oropharyngeal exudate and posterior oropharyngeal erythema present.   Eyes:      Conjunctiva/sclera: Conjunctivae normal.      Pupils: Pupils are equal, round, and reactive to light.   Neck:      Musculoskeletal: Normal range of motion and neck supple.   Cardiovascular:      Rate and Rhythm: Normal rate and regular rhythm.      Heart sounds: Normal heart sounds.   Pulmonary:      Effort: Pulmonary effort is normal. No respiratory distress.      Breath sounds: Normal breath sounds.   Abdominal:      General: Bowel sounds are normal. There is no distension.      Palpations: Abdomen is soft.      Tenderness: There is no abdominal tenderness.   Skin:     General: Skin is warm and dry.      Findings: No rash.   Neurological:      Mental Status: She is alert and oriented to person, place, and time.   Psychiatric:         Behavior: Behavior normal.         Lab Results   Component Value Date    WBC 3.67 (L) 06/10/2020    HGB 11.5 (L) 06/10/2020    HCT 35.8 (L) 06/10/2020     (L) 06/10/2020    CHOL 141 06/10/2020    TRIG 54 06/10/2020    HDL 67 06/10/2020    ALT 16 05/17/2019    AST 29 05/17/2019     06/10/2020    K 4.4 06/10/2020     06/10/2020    CREATININE 0.8 06/10/2020    BUN 7 (L) 06/10/2020    CO2 29 06/10/2020    TSH 1.509 02/29/2020    INR 1.8 (H) 08/07/2012    HGBA1C 5.5 05/17/2019     The ASCVD Risk score (Alba DC Jr., et al., 2013) failed to calculate for the following reasons:    The 2013 ASCVD risk score is only valid for ages 40 to 79    Assessment:       1. Urinary tract infection without hematuria, site unspecified    2. Dysuria    3. Acute non-recurrent frontal sinusitis    4. Essential hypertension    5. Generalized abdominal pain        Plan:       Ama was seen today for allergies, abdominal pain and urinary tract infection.    Diagnoses and all orders for  this visit:    Urinary tract infection without hematuria, site unspecified / Dysuria  -     POCT urine dipstick without microscope  -     Urinalysis  -     Urinalysis Microscopic  -     Urine culture  -     amoxicillin-clavulanate 875-125mg (AUGMENTIN) 875-125 mg per tablet; Take 1 tablet by mouth 2 (two) times daily. for 7 days    Acute non-recurrent frontal sinusitis  -     amoxicillin-clavulanate 875-125mg (AUGMENTIN) 875-125 mg per tablet; Take 1 tablet by mouth 2 (two) times daily. for 7 days    Essential hypertension  Stable  Continue medications as prescribed.  Follow up with PCP    Generalized abdominal pain  -     US Abdomen Complete; Future      Follow up if symptoms worsen or fail to improve.

## 2020-10-13 ENCOUNTER — TELEPHONE (OUTPATIENT)
Dept: FAMILY MEDICINE | Facility: CLINIC | Age: 85
End: 2020-10-13

## 2020-10-13 RX ORDER — OMEPRAZOLE 20 MG/1
20 TABLET, DELAYED RELEASE ORAL DAILY
Qty: 90 TABLET | Refills: 3 | Status: SHIPPED | OUTPATIENT
Start: 2020-10-13 | End: 2021-04-23

## 2020-10-13 RX ORDER — OMEPRAZOLE 20 MG/1
20 TABLET, DELAYED RELEASE ORAL DAILY
COMMUNITY
Start: 2020-10-13 | End: 2020-10-13 | Stop reason: SDUPTHER

## 2020-10-13 NOTE — TELEPHONE ENCOUNTER
Patient was seen yesterday 10/12/20 by MARY Llamas. Patient would like a refill of omeprazole 20 MG tablet.

## 2020-10-13 NOTE — TELEPHONE ENCOUNTER
----- Message from Luana Ricardo MA sent at 10/13/2020  3:21 PM CDT -----  Pt is calling in regards to her refill  Meds: omeprazole (PRILOSEC OTC) 20 MG tablet  Pharms:[facility] - [place], [place] - [address] [phone] (Phone)   Additional info: Pt stated pharm does not have script

## 2020-10-13 NOTE — TELEPHONE ENCOUNTER
----- Message from Sonia Jordan MA sent at 10/13/2020 10:40 AM CDT -----  Regarding: refill  Contact: Cedric  Type:  RX Refill Request    Who Called: patient   Refill or New Rx:  refill  RX Name and Strength:  omeprazole (PRILOSEC OTC) 20 MG tablet      How is the patient currently taking it? (ex. 1XDay):    Is this a 30 day or 90 day RX:  90 day   Preferred Pharmacy with phone number:    [facility] - [place], [place] - [address] [address] [facility] [address]  [id number] [address] [facility] [address]  [place] [address]  Phone: [phone] Fax: [phone]              Local or Mail Order:  local   Ordering Provider:  Kirstin Gresham Call Back Number:  [phone] (home)     Additional Information:  patient was seen yesterday in office and ask for medication refill , states she forgot to mention per patient

## 2020-10-13 NOTE — TELEPHONE ENCOUNTER
Informed patient MARY Llamas sent Rx for omeprazole to pharmacy. Patient verbalized understanding.

## 2020-10-27 ENCOUNTER — OFFICE VISIT (OUTPATIENT)
Dept: FAMILY MEDICINE | Facility: CLINIC | Age: 85
End: 2020-10-27
Payer: MEDICARE

## 2020-10-27 VITALS
SYSTOLIC BLOOD PRESSURE: 128 MMHG | OXYGEN SATURATION: 95 % | DIASTOLIC BLOOD PRESSURE: 88 MMHG | WEIGHT: 136.25 LBS | HEIGHT: 64 IN | TEMPERATURE: 99 F | BODY MASS INDEX: 23.26 KG/M2 | HEART RATE: 93 BPM

## 2020-10-27 DIAGNOSIS — N32.89 BLADDER SPASMS: ICD-10-CM

## 2020-10-27 DIAGNOSIS — R35.0 FREQUENCY OF URINATION: ICD-10-CM

## 2020-10-27 DIAGNOSIS — N39.0 URINARY TRACT INFECTION WITHOUT HEMATURIA, SITE UNSPECIFIED: Primary | ICD-10-CM

## 2020-10-27 LAB
BILIRUB SERPL-MCNC: ABNORMAL MG/DL
BLOOD URINE, POC: ABNORMAL
CLARITY, POC UA: ABNORMAL
COLOR, POC UA: YELLOW
GLUCOSE UR QL STRIP: ABNORMAL
KETONES UR QL STRIP: ABNORMAL
LEUKOCYTE ESTERASE URINE, POC: ABNORMAL
NITRITE, POC UA: ABNORMAL
PH, POC UA: 5
PROTEIN, POC: ABNORMAL
SPECIFIC GRAVITY, POC UA: 1010
UROBILINOGEN, POC UA: NORMAL

## 2020-10-27 PROCEDURE — 99999 PR PBB SHADOW E&M-EST. PATIENT-LVL V: CPT | Mod: PBBFAC,,, | Performed by: PHYSICIAN ASSISTANT

## 2020-10-27 PROCEDURE — 87088 URINE BACTERIA CULTURE: CPT

## 2020-10-27 PROCEDURE — 87186 SC STD MICRODIL/AGAR DIL: CPT | Mod: 59

## 2020-10-27 PROCEDURE — 99213 PR OFFICE/OUTPT VISIT, EST, LEVL III, 20-29 MIN: ICD-10-PCS | Mod: S$PBB,,, | Performed by: PHYSICIAN ASSISTANT

## 2020-10-27 PROCEDURE — 87077 CULTURE AEROBIC IDENTIFY: CPT | Mod: 59

## 2020-10-27 PROCEDURE — 99999 PR PBB SHADOW E&M-EST. PATIENT-LVL V: ICD-10-PCS | Mod: PBBFAC,,, | Performed by: PHYSICIAN ASSISTANT

## 2020-10-27 PROCEDURE — 81002 URINALYSIS NONAUTO W/O SCOPE: CPT | Mod: PBBFAC,PO | Performed by: PHYSICIAN ASSISTANT

## 2020-10-27 PROCEDURE — 87086 URINE CULTURE/COLONY COUNT: CPT

## 2020-10-27 PROCEDURE — 99215 OFFICE O/P EST HI 40 MIN: CPT | Mod: PBBFAC,PO | Performed by: PHYSICIAN ASSISTANT

## 2020-10-27 PROCEDURE — 99213 OFFICE O/P EST LOW 20 MIN: CPT | Mod: S$PBB,,, | Performed by: PHYSICIAN ASSISTANT

## 2020-10-30 ENCOUNTER — TELEPHONE (OUTPATIENT)
Dept: FAMILY MEDICINE | Facility: CLINIC | Age: 85
End: 2020-10-30

## 2020-10-30 LAB
BACTERIA UR CULT: ABNORMAL
BACTERIA UR CULT: ABNORMAL

## 2020-10-30 RX ORDER — DOXYCYCLINE 100 MG/1
100 CAPSULE ORAL 2 TIMES DAILY
Qty: 20 CAPSULE | Refills: 0 | Status: SHIPPED | OUTPATIENT
Start: 2020-10-30 | End: 2020-11-09

## 2020-10-30 NOTE — TELEPHONE ENCOUNTER
Talked with pt.  Pt. Has UTI  Doxy sent to pt. Pharmacy   Pt. Will f/u if sx do not completely resolve

## 2020-10-30 NOTE — PROGRESS NOTES
Subjective:       Patient ID: Ama Lang is a 89 y.o. female.    Chief Complaint: Urinary Tract Infection    HPI  Review of Systems   Constitutional: Negative.  Negative for activity change, appetite change, chills, diaphoresis, fatigue, fever and unexpected weight change.   HENT: Negative.    Eyes: Negative.    Respiratory: Negative.    Cardiovascular: Negative.  Negative for chest pain and leg swelling.   Gastrointestinal: Negative.    Endocrine: Negative.    Genitourinary: Positive for frequency and urgency. Negative for dysuria and hematuria.   Musculoskeletal: Negative.    Integumentary:  Negative for rash. Negative.   Neurological: Negative.          Objective:      Physical Exam  Vitals signs reviewed.   Constitutional:       General: She is not in acute distress.     Appearance: Normal appearance. She is normal weight. She is not ill-appearing, toxic-appearing or diaphoretic.   HENT:      Head: Normocephalic and atraumatic.      Mouth/Throat:      Mouth: Mucous membranes are moist.      Pharynx: Oropharynx is clear. No oropharyngeal exudate or posterior oropharyngeal erythema.   Eyes:      General: No scleral icterus.     Conjunctiva/sclera: Conjunctivae normal.   Neck:      Musculoskeletal: Normal range of motion and neck supple. No neck rigidity or muscular tenderness.      Vascular: No carotid bruit.   Cardiovascular:      Rate and Rhythm: Normal rate and regular rhythm.      Pulses: Normal pulses.      Heart sounds: Normal heart sounds. No murmur. No friction rub. No gallop.    Pulmonary:      Effort: Pulmonary effort is normal. No respiratory distress.      Breath sounds: Normal breath sounds. No stridor. No wheezing, rhonchi or rales.   Abdominal:      General: Abdomen is flat. There is no distension.      Palpations: Abdomen is soft. There is no mass.      Tenderness: There is no abdominal tenderness. There is no right CVA tenderness, left CVA tenderness, guarding or rebound.      Hernia: No  hernia is present.   Musculoskeletal:         General: No swelling or tenderness.      Right lower leg: No edema.      Left lower leg: No edema.   Lymphadenopathy:      Cervical: No cervical adenopathy.   Skin:     General: Skin is warm and dry.      Findings: No rash.   Neurological:      General: No focal deficit present.      Mental Status: She is alert and oriented to person, place, and time.         Assessment:       1. Urinary tract infection without hematuria, site unspecified    2. Frequency of urination    3. Bladder spasms        Plan:       Ama was seen today for urinary tract infection.    Diagnoses and all orders for this visit:    Urinary tract infection without hematuria, site unspecified  -     POCT URINE DIPSTICK WITHOUT MICROSCOPE  -     Urine culture  -     Ambulatory referral/consult to Urology; Future  -     doxycycline (MONODOX) 100 MG capsule; Take 1 capsule (100 mg total) by mouth 2 (two) times daily. for 10 days    Frequency of urination  -     Ambulatory referral/consult to Urology; Future    Bladder spasms  -     Ambulatory referral/consult to Urology; Future     hydrate  Will wait on culture results    POCT URINE TODAY SHOWS  2 + WBC's  Neg nitrite  Neg blood    Culture confirms urinary infection

## 2020-11-10 ENCOUNTER — TELEPHONE (OUTPATIENT)
Dept: UROLOGY | Facility: CLINIC | Age: 85
End: 2020-11-10

## 2020-11-10 NOTE — TELEPHONE ENCOUNTER
----- Message from Arlene Calhoun MD sent at 11/9/2020  6:03 PM CST -----  See if she can see joie in pm same day

## 2020-11-10 NOTE — TELEPHONE ENCOUNTER
Spoke with patient appointment rescheduled from with  to with delia salter on 11/12 at 1pm. Patient verbally voiced understanding.

## 2020-11-11 ENCOUNTER — PATIENT OUTREACH (OUTPATIENT)
Dept: ADMINISTRATIVE | Facility: OTHER | Age: 85
End: 2020-11-11

## 2020-11-11 NOTE — PROGRESS NOTES
"[facility] Note  Staff: NICKY Moreno    PCP: Erwin Almodovar PA-C  Cardiologist:  Dr. Mullen    Chief Complaint: Urinary tract infections    Subjective:        HPI: Ama Lang is a 89 y.o. female NEW PT presents today for evaluation of recent urinary tract infection.  Pt is accompanied by her daughter in law during today's office visit.    Pt was seen by her PCP on 10/27/2020 for increased urinary urgency and frequency symptoms.  Urine Culture performed on 10/27/2020 showed the following:  Enterobacter Cloacae >100,000  Klebsiella aerogenes  >100,000  **Pt was prescribed Monodox 100 mg BID x 10 days from PCP office.  Pt's last dose of last antibx regimen was over one week ago.    Pt is unsure whether she has a UTI during today's office visit.  She denies gross hematuria, but states she gets bladder/abdominal pains "all the time".    Medical Hx includes:  Atrial Fibrillation:  She is currently taking Eliquis 2.5 mg BID and Ecotrin 81 mg daily.    REVIEW OF SYSTEMS:  A comprehensive 10 system review was performed and is negative except as noted above in HPI    PMHx:  Past Medical History:   Diagnosis Date    *Atrial fibrillation     Dr. Mullen    Cancer     skin cancer arms     Cataract     Diverticulitis     Hyperlipidemia     Hypertension     Hypothyroidism      PSHx:  Past Surgical History:   Procedure Laterality Date    COLONOSCOPY  12/18/2003    Dr. Santoyo    CYSTOSCOPY  2008    Dr. Nguyen    DILATION AND CURETTAGE OF UTERUS      EYE SURGERY      skin cancer bilateral arms  08/2019    Dr Soler skin cancer both arms     TUBAL LIGATION       Allergies:  Montelukast and Hydrochlorothiazide    Medications: reviewed   Anticoagulation: Yes -Eliquis 2.5 mg BID, Ecotrin 81 mg daily    Objective:     Vitals:    11/12/20 1252   BP: (!) 149/95   Pulse: 76   Resp: 18     Physical Exam  Vitals signs reviewed.   Constitutional:       Appearance: She is well-developed. "   HENT:      Head: Normocephalic and atraumatic.   Eyes:      Conjunctiva/sclera: Conjunctivae normal.      Pupils: Pupils are equal, round, and reactive to light.   Neck:      Musculoskeletal: Normal range of motion and neck supple.   Cardiovascular:      Rate and Rhythm: Normal rate and regular rhythm.      Heart sounds: Normal heart sounds.   Pulmonary:      Effort: Pulmonary effort is normal.      Breath sounds: Normal breath sounds.   Abdominal:      General: Bowel sounds are normal.      Palpations: Abdomen is soft.   Musculoskeletal: Normal range of motion.   Skin:     General: Skin is warm and dry.   Neurological:      Mental Status: She is alert and oriented to person, place, and time.      Deep Tendon Reflexes: Reflexes are normal and symmetric.   Psychiatric:         Behavior: Behavior normal.         Thought Content: Thought content normal.         Judgment: Judgment normal.      EXAM performed by me in office today:  Deferred    LABS REVIEW:  UA today:   Color:Clear, Yellow  Spec. Grav.  1.025  PH  6.0  30 of Protein  Trace of leukocytes  Assessment:       1. Urinary tract infection without hematuria, site unspecified    2. Frequency of urination    3. Bladder spasms          Plan:     1. UA Micro, Urine Culture to be performed.  2. RBUS to be scheduled for further evaluation of pt's kidneys and bladder.  In reviewing past imaging, she has had kidney mass/cysts in the past.    F/u-Our office will contact this pt after we receive and review ALL imaging and urine results to determine best POC for this patient.  Pt verbalized understanding at conclusion of appointment today.  Pt was instructed to contact our office should their symptoms worsen or any new  complaints.    [facility]: N[facility]A    For your recurrent UTIs:  Behavioral changes to help decrease UTI recurrences   -increase water intake (6 to 8 bottles water per day)   -don't hold urine, void every 2 to 3 hours   -prevent constipation (miralax capful  daily if necessary) to have a bowel movement daily and keep stools soft  -cut down on caffeine,drink mainly water  -no douching   -void immediately after sexual intercourse  -wipe front to back     OTC meds to try (go to the pharmacist and ask where they are, they are over the counter)  -Use lactobacillus probiotic in capsule form in vagina once nightly for 10 days if you feel like you have an infection coming on   -cranberry pills 500mg tabs TID, can buy over the counter  -take a probiotic daily  -D-Mannose once daily over the counter    IMPORTANT: If you feel like you have a UTI coming on, call our office and talk to one of the nurses. We will have you drop off a urine here in clinic or at one of our [facility] facilities. I do not typically like to write for antibiotics unless we have a urine culture. Avoid going to urgent care. We need to start documenting your urine cultures here in our office to see if they are really recurrent UTIs or sx of UTIs.     If it is over the weekend and you cannot wait, call our on call doctor, however we still prefer urine cultures before giving antibiotics.     If you have fever and it doesn't improve with tylenol or ibuprofen or if you have flank pain associated with the uti symptoms go to the ER.     If you do continue to have positive urine cultures then we may proceed with doing a cystoscopy (to look in your bladder) and an imaging study.     Felicia Roman, NICKY

## 2020-11-12 ENCOUNTER — APPOINTMENT (OUTPATIENT)
Dept: LAB | Facility: HOSPITAL | Age: 85
End: 2020-11-12
Attending: NURSE PRACTITIONER
Payer: MEDICARE

## 2020-11-12 ENCOUNTER — OFFICE VISIT (OUTPATIENT)
Dept: UROLOGY | Facility: CLINIC | Age: 85
End: 2020-11-12
Payer: MEDICARE

## 2020-11-12 VITALS
SYSTOLIC BLOOD PRESSURE: 149 MMHG | HEART RATE: 76 BPM | WEIGHT: 136.25 LBS | BODY MASS INDEX: 23.26 KG/M2 | HEIGHT: 64 IN | RESPIRATION RATE: 18 BRPM | DIASTOLIC BLOOD PRESSURE: 95 MMHG

## 2020-11-12 DIAGNOSIS — N32.89 BLADDER SPASMS: ICD-10-CM

## 2020-11-12 DIAGNOSIS — N39.0 URINARY TRACT INFECTION WITHOUT HEMATURIA, SITE UNSPECIFIED: ICD-10-CM

## 2020-11-12 DIAGNOSIS — R35.0 FREQUENCY OF URINATION: ICD-10-CM

## 2020-11-12 LAB
BACTERIA #/AREA URNS HPF: NORMAL /HPF
BILIRUB SERPL-MCNC: ABNORMAL MG/DL
BLOOD URINE, POC: ABNORMAL
CLARITY, POC UA: CLEAR
COLOR, POC UA: YELLOW
GLUCOSE UR QL STRIP: ABNORMAL
KETONES UR QL STRIP: ABNORMAL
LEUKOCYTE ESTERASE URINE, POC: ABNORMAL
MICROSCOPIC COMMENT: NORMAL
NITRITE, POC UA: ABNORMAL
PH, POC UA: 1.02
PROTEIN, POC: 30
SQUAMOUS #/AREA URNS HPF: 3 /HPF
UROBILINOGEN, POC UA: 0.2
WBC #/AREA URNS HPF: 2 /HPF (ref 0–5)

## 2020-11-12 PROCEDURE — 99215 OFFICE O/P EST HI 40 MIN: CPT | Mod: PBBFAC,PN | Performed by: NURSE PRACTITIONER

## 2020-11-12 PROCEDURE — 99999 PR PBB SHADOW E&M-EST. PATIENT-LVL V: ICD-10-PCS | Mod: PBBFAC,,, | Performed by: NURSE PRACTITIONER

## 2020-11-12 PROCEDURE — 87086 URINE CULTURE/COLONY COUNT: CPT

## 2020-11-12 PROCEDURE — 99999 PR PBB SHADOW E&M-EST. PATIENT-LVL V: CPT | Mod: PBBFAC,,, | Performed by: NURSE PRACTITIONER

## 2020-11-12 PROCEDURE — 81002 URINALYSIS NONAUTO W/O SCOPE: CPT | Mod: PBBFAC,PN | Performed by: NURSE PRACTITIONER

## 2020-11-12 PROCEDURE — 99214 OFFICE O/P EST MOD 30 MIN: CPT | Mod: S$PBB,,, | Performed by: NURSE PRACTITIONER

## 2020-11-12 PROCEDURE — 81000 URINALYSIS NONAUTO W/SCOPE: CPT

## 2020-11-12 PROCEDURE — 99214 PR OFFICE/OUTPT VISIT, EST, LEVL IV, 30-39 MIN: ICD-10-PCS | Mod: S$PBB,,, | Performed by: NURSE PRACTITIONER

## 2020-11-12 NOTE — LETTER
November 13, 2020      Erwin Almodovar PA-C  [address]  [place] [address]           [facility]  [address] [place] [address] [place] [address]  Phone: [phone]  Fax: [phone]          Patient: Ama Lang   MR Number: [id number]   YOB: 1928   Date of Visit: 11/12/2020       Dear Erwin Almodovar:    Thank you for referring Ama Lang to me for evaluation. Attached you will find relevant portions of my assessment and plan of care.    If you have questions, please do not hesitate to call me. I look forward to following Ama Lang along with you.    Sincerely,    Felicia Roman, [facility]    Enclosure  CC:  No Recipients    If you would like to receive this communication electronically, please contact [email or web address] or [phone] to request more information on [employer] access.    For providers and/or their staff who would like to refer a patient to [facility], please contact us through our one-stop-shop provider referral line, [facility], at [phone].    If you feel you have received this communication in error or would no longer like to receive these types of communications, please e-mail [email or web address]

## 2020-11-12 NOTE — PROGRESS NOTES
Chart was reviewed for overdue [facility]active [facility] Encounters (JESSICA)  topics  Updates were requested from care everywhere  Health Maintenance has been updated  LINKS immunization registry triggered

## 2020-11-14 LAB — BACTERIA UR CULT: NORMAL

## 2020-11-16 ENCOUNTER — HOSPITAL ENCOUNTER (OUTPATIENT)
Dept: RADIOLOGY | Facility: HOSPITAL | Age: 85
Discharge: HOME | End: 2020-11-16
Attending: NURSE PRACTITIONER
Payer: MEDICARE

## 2020-11-16 DIAGNOSIS — N32.89 BLADDER SPASMS: ICD-10-CM

## 2020-11-16 DIAGNOSIS — R35.0 FREQUENCY OF URINATION: ICD-10-CM

## 2020-11-16 DIAGNOSIS — N39.0 URINARY TRACT INFECTION WITHOUT HEMATURIA, SITE UNSPECIFIED: ICD-10-CM

## 2020-11-16 PROCEDURE — 76770 US RETROPERITONEAL COMPLETE: ICD-10-PCS | Mod: 26,,, | Performed by: RADIOLOGY

## 2020-11-16 PROCEDURE — 76770 US EXAM ABDO BACK WALL COMP: CPT | Mod: TC

## 2020-11-16 PROCEDURE — 76770 US EXAM ABDO BACK WALL COMP: CPT | Mod: 26,,, | Performed by: RADIOLOGY

## 2020-12-01 ENCOUNTER — OFFICE VISIT (OUTPATIENT)
Dept: UROLOGY | Facility: CLINIC | Age: 85
End: 2020-12-01
Payer: MEDICARE

## 2020-12-01 VITALS
HEIGHT: 64 IN | BODY MASS INDEX: 22.25 KG/M2 | DIASTOLIC BLOOD PRESSURE: 82 MMHG | WEIGHT: 130.31 LBS | SYSTOLIC BLOOD PRESSURE: 152 MMHG | RESPIRATION RATE: 18 BRPM | HEART RATE: 78 BPM

## 2020-12-01 DIAGNOSIS — N39.0 URINARY TRACT INFECTION WITHOUT HEMATURIA, SITE UNSPECIFIED: Primary | ICD-10-CM

## 2020-12-01 DIAGNOSIS — N32.3 BLADDER DIVERTICULUM: ICD-10-CM

## 2020-12-01 LAB
BILIRUB SERPL-MCNC: ABNORMAL MG/DL
BLOOD URINE, POC: ABNORMAL
CLARITY, POC UA: ABNORMAL
COLOR, POC UA: YELLOW
GLUCOSE UR QL STRIP: ABNORMAL
KETONES UR QL STRIP: ABNORMAL
LEUKOCYTE ESTERASE URINE, POC: ABNORMAL
NITRITE, POC UA: ABNORMAL
PH, POC UA: 6
PROTEIN, POC: ABNORMAL
SPECIFIC GRAVITY, POC UA: 1.01
UROBILINOGEN, POC UA: 0.2

## 2020-12-01 PROCEDURE — 87077 CULTURE AEROBIC IDENTIFY: CPT | Mod: 59

## 2020-12-01 PROCEDURE — 87086 URINE CULTURE/COLONY COUNT: CPT

## 2020-12-01 PROCEDURE — 99214 OFFICE O/P EST MOD 30 MIN: CPT | Mod: S$PBB,,, | Performed by: NURSE PRACTITIONER

## 2020-12-01 PROCEDURE — 99214 OFFICE O/P EST MOD 30 MIN: CPT | Mod: PBBFAC,PN | Performed by: NURSE PRACTITIONER

## 2020-12-01 PROCEDURE — 87186 SC STD MICRODIL/AGAR DIL: CPT

## 2020-12-01 PROCEDURE — 99999 PR PBB SHADOW E&M-EST. PATIENT-LVL IV: ICD-10-PCS | Mod: PBBFAC,,, | Performed by: NURSE PRACTITIONER

## 2020-12-01 PROCEDURE — 99999 PR PBB SHADOW E&M-EST. PATIENT-LVL IV: CPT | Mod: PBBFAC,,, | Performed by: NURSE PRACTITIONER

## 2020-12-01 PROCEDURE — 81002 URINALYSIS NONAUTO W/O SCOPE: CPT | Mod: PBBFAC,PN | Performed by: NURSE PRACTITIONER

## 2020-12-01 PROCEDURE — 87088 URINE BACTERIA CULTURE: CPT

## 2020-12-01 PROCEDURE — 99214 PR OFFICE/OUTPT VISIT, EST, LEVL IV, 30-39 MIN: ICD-10-PCS | Mod: S$PBB,,, | Performed by: NURSE PRACTITIONER

## 2020-12-01 RX ORDER — PHENAZOPYRIDINE HYDROCHLORIDE 200 MG/1
200 TABLET, FILM COATED ORAL 3 TIMES DAILY PRN
Qty: 30 TABLET | Refills: 1 | Status: SHIPPED | OUTPATIENT
Start: 2020-12-01 | End: 2020-12-11

## 2020-12-01 RX ORDER — DOXYCYCLINE HYCLATE 100 MG
100 TABLET ORAL 2 TIMES DAILY
Qty: 20 TABLET | Refills: 0 | Status: SHIPPED | OUTPATIENT
Start: 2020-12-01 | End: 2020-12-11

## 2020-12-01 NOTE — PROGRESS NOTES
"[facility] Note  Staff: NICKY Moreno    PCP: Erwin Almodovar PA-C  Cardiologist:  Dr. Mullen    Chief Complaint: Rtaeai-th-Yvahahpkb UTI, Bladder pain    Subjective:        HPI: Ama Lang is a 89 y.o. female presents today with ongoing bladder pain and UTI symptoms.  UA today is abnormal.     HX:  Pt was last seen by me on 11/12/2020 as a new patient for evaluation of a recent UTI.  Pt was seen by her PCP on 10/27/2020 for increased urinary urgency and frequency symptoms.  Urine Culture performed on 10/27/2020 showed the following:  Enterobacter Cloacae >100,000  Klebsiella aerogenes  >100,000  **Pt was prescribed Monodox 100 mg BID x 10 days from PCP office.  Pt's last dose of last antibx regimen was over one week ago.  She denies gross hematuria, but states she gets bladder/abdominal pains "all the time".    Recent Imaging:  RBUS on 11/16/2020:  IMPRESSION:  No hydronephrosis or other significant renal abnormality.   Fluid containing finding cephalad to the urinary bladder, without   detrimental change and possibly a large bladder diverticulum.     Medical Hx includes:  Atrial Fibrillation:  She is currently taking Eliquis 2.5 mg BID and Ecotrin 81 mg daily.     REVIEW OF SYSTEMS:  A comprehensive 10 system review was performed and is negative except as noted above in HPI    PMHx:  Past Medical History:   Diagnosis Date    *Atrial fibrillation     Dr. Mullen    Cancer     skin cancer arms     Cataract     Diverticulitis     Hyperlipidemia     Hypertension     Hypothyroidism      PSHx:  Past Surgical History:   Procedure Laterality Date    COLONOSCOPY  12/18/2003    Dr. Santoyo    CYSTOSCOPY  2008    Dr. Nguyen    DILATION AND CURETTAGE OF UTERUS      EYE SURGERY      skin cancer bilateral arms  08/2019    Dr Soler skin cancer both arms     TUBAL LIGATION       Allergies:  Montelukast and Hydrochlorothiazide    Medications: reviewed   Anticoagulation: Yes " -Eliquis 2.5 mg BID, Ecotrin 81 mg daily    Objective:     Vitals:    12/01/20 1440   BP: (!) 152/82   Pulse: 78   Resp: 18     Physical Exam  Vitals signs reviewed.   Constitutional:       Appearance: She is well-developed.   HENT:      Head: Normocephalic and atraumatic.   Eyes:      Conjunctiva/sclera: Conjunctivae normal.      Pupils: Pupils are equal, round, and reactive to light.   Neck:      Musculoskeletal: Normal range of motion and neck supple.   Cardiovascular:      Rate and Rhythm: Normal rate and regular rhythm.      Heart sounds: Normal heart sounds.   Pulmonary:      Effort: Pulmonary effort is normal.      Breath sounds: Normal breath sounds.   Abdominal:      General: Bowel sounds are normal.      Palpations: Abdomen is soft.   Musculoskeletal: Normal range of motion.   Skin:     General: Skin is warm and dry.   Neurological:      Mental Status: She is alert and oriented to person, place, and time.      Deep Tendon Reflexes: Reflexes are normal and symmetric.   Psychiatric:         Behavior: Behavior normal.         Thought Content: Thought content normal.         Judgment: Judgment normal.     LABS REVIEW:  UA today:   Color: Cloudy, Yellow urine  Spec. Grav.  1.015  PH  6.0  Small leukocytes  Trace Blood  Assessment:       1. Urinary tract infection without hematuria, site unspecified    2. Bladder diverticulum          Plan:     1. Urine culture to be performed today due to pt's continuous symptoms.  2. In meantime, I have prescribed pt Vibra-tabs BID and Pyridium prn for her bladder pain until she sees Dr. Robbins for further evaluation if indicated.  3. Pt has hx of high bacterial resistance infections which may be related to bladder diverticulum?  And would pt be a candidate for possible cysto?    F/u with MD as scheduled.    [facility]: None    Felicia Roman, FNP-C

## 2020-12-04 LAB
BACTERIA UR CULT: ABNORMAL
BACTERIA UR CULT: ABNORMAL

## 2020-12-13 ENCOUNTER — PATIENT OUTREACH (OUTPATIENT)
Dept: ADMINISTRATIVE | Facility: OTHER | Age: 85
End: 2020-12-13

## 2020-12-13 NOTE — PROGRESS NOTES
"[facility] Note    PCP: Dale Fulton MD    Chief Complaint: recurrent UTIs    SUBJECTIVE:       History of Present Illness:  Ama Lang is a 89 y.o. female who presents to clinic for recurrent UTIs. She is Established  to our clinic, but new to me.     Previously seen by Felicia for recurrent UTIs. Complaining of bladder and abdominal pain.   Had a TREV which showed no hydronephrosis, stones, or masses and a possible bladder diverticulum. This is not mentioned on previous ultrasounds (does appear present on US in 2017 but not prior) however there is concern for a possible left renal mass vs lobulations with atrophy of left kidney.     Has seen Dr. Calhoun in the past. Did not want CTU at that time. Was scheduled for cysto however this was cancelled. Was previously on suppressive antibiotics, however she does not remember this. No documented PVRs     Recently had positive UTI, off antibiotics for few days. Symptoms include abdominal pain, back pain, frequency, dysruia. No hematuria.   Wears 4 ppd, leaks with coughing, standing. Has some urgency.   Has a bowel movement "pretty much" every day. Usually doesn't have to strain.   Now on a daily probiotic and chewable cranberry supplements.     UA today: +trace blood, trace leuks, nitrite negative   PVR today: 50 cc    Last urine culture: E coil, Klebsiella (12/1/20)    Lab Results   Component Value Date    CREATININE 0.8 06/10/2020     Hx of a fib, HTN, HLD.     Past medical, family, and social history reviewed as documented in chart with pertinent positive medical, family, and social history detailed in HPI.    Review of patient's allergies indicates:   Allergen Reactions    Montelukast Other (See Comments)     Depleted sodium     Hydrochlorothiazide Other (See Comments)     Low sodium       Past Medical History:   Diagnosis Date    *Atrial fibrillation     Dr. Mullen    Cancer     skin cancer arms     Cataract     " "Diverticulitis     Hyperlipidemia     Hypertension     Hypothyroidism      Past Surgical History:   Procedure Laterality Date    COLONOSCOPY  12/18/2003    Dr. Santoyo    CYSTOSCOPY  2008    Dr. Nguyen    DILATION AND CURETTAGE OF UTERUS      EYE SURGERY      skin cancer bilateral arms  08/2019    Dr Soler skin cancer both arms     TUBAL LIGATION       Family History   Problem Relation Age of Onset    Cancer Mother         ovarian    Heart disease Father     [place]'s disease Father     Macular degeneration Father     Heart disease Brother     Cancer Sister         blood    Cancer Brother     [place]'s disease Daughter     Early death Daughter     Arthritis Sister         spine    Carpal tunnel syndrome Son     Hypertension Son     Fibromyalgia Daughter     [place]'s disease Daughter     Arthritis Daughter         x2    Kidney cancer Neg Hx     Prostate cancer Neg Hx     Urolithiasis Neg Hx      Social History     Tobacco Use    Smoking status: Never Smoker    Smokeless tobacco: Never Used   Substance Use Topics    Alcohol use: No    Drug use: No        Review of Systems   Constitutional: Negative for chills and fever.   HENT: Negative for trouble swallowing.    Eyes: Negative for pain.   Respiratory: Negative for cough and shortness of breath.    Cardiovascular: Negative for chest pain and palpitations.   Gastrointestinal: Positive for abdominal pain. Negative for constipation, nausea and vomiting.   Genitourinary: Positive for urgency. Negative for difficulty urinating, dysuria, flank pain and hematuria.   Skin: Negative for rash.   Neurological: Negative for weakness.   Psychiatric/Behavioral: Negative for behavioral problems.       OBJECTIVE:     Anticoagulation:  Eliquis 2.5 mg, aspirin 81 mg     Estimated body mass index is 22.36 kg/m² as calculated from the following:    Height as of this encounter: 5' 4" (1.626 m).    Weight as of this encounter: 59.1 kg (130 lb 4.7 " oz).    Vital Signs (Most Recent)  Temp: 97.3 °F (36.3 °C) (12/14/20 1355)  Pulse: 90 (12/14/20 1355)  Resp: 18 (12/14/20 1355)  BP: (!) 146/84 (12/14/20 1355)    Physical Exam  Vitals signs reviewed.   Constitutional:       General: She is not in acute distress.     Appearance: Normal appearance. She is not ill-appearing.   HENT:      Head: Normocephalic and atraumatic.   Eyes:      General: No scleral icterus.  Cardiovascular:      Rate and Rhythm: Normal rate and regular rhythm.   Pulmonary:      Effort: Pulmonary effort is normal. No respiratory distress.   Abdominal:      General: There is no distension.      Palpations: Abdomen is soft.      Tenderness: There is no abdominal tenderness.   Skin:     General: Skin is warm and dry.      Coloration: Skin is not jaundiced.   Neurological:      General: No focal deficit present.      Mental Status: She is alert and oriented to person, place, and time.   Psychiatric:         Mood and Affect: Mood normal.         Behavior: Behavior normal.         BMP  Lab Results   Component Value Date     06/10/2020    K 4.4 06/10/2020     06/10/2020    CO2 29 06/10/2020    BUN 7 (L) 06/10/2020    CREATININE 0.8 06/10/2020    CALCIUM 9.7 06/10/2020    ANIONGAP 5 (L) 06/10/2020    ESTGFRAFRICA >60.0 06/10/2020    EGFRNONAA >60.0 06/10/2020       Lab Results   Component Value Date    WBC 3.67 (L) 06/10/2020    HGB 11.5 (L) 06/10/2020    HCT 35.8 (L) 06/10/2020     (H) 06/10/2020     (L) 06/10/2020       Imaging:  Personally reviewed, per HPI.     ASSESSMENT     1. Bladder diverticulum    2. Recurrent UTI    3. Renal mass, left    4. Other hyperlipidemia    5. Chronic atrial fibrillation    6. Essential hypertension        PLAN:     - Discussed CT Urogram to evaluate urinary tract as well as potential left renal mass however she does not want to complete at this time  - Is not interested in any sort of surgeries  - Cysto 1/20 to evaluate diverticulum and  bladder. COVID prior.   - Recommended continuing probiotic, cranberry supplements with 36 mg PAC, daily bowel movement, and drinking at least 2 L of water a day to prevent UTIs  - Will perform vaginal exam at time of cysto and will likely also prescribe vaginal estrogen cream   - We discussed that her leakage is primarily stress related and this would be fixed with a surgery which she is not interested in. Recommended behavioral management and kegel exercises.   - Discussed that if she believes she has an infection she should call the office to drop off urine culture prior to treatment       Khushbu Robbins MD

## 2020-12-14 ENCOUNTER — OFFICE VISIT (OUTPATIENT)
Dept: UROLOGY | Facility: CLINIC | Age: 85
End: 2020-12-14
Payer: MEDICARE

## 2020-12-14 VITALS
WEIGHT: 130.31 LBS | HEART RATE: 90 BPM | SYSTOLIC BLOOD PRESSURE: 146 MMHG | RESPIRATION RATE: 18 BRPM | TEMPERATURE: 97 F | HEIGHT: 64 IN | DIASTOLIC BLOOD PRESSURE: 84 MMHG | BODY MASS INDEX: 22.25 KG/M2

## 2020-12-14 DIAGNOSIS — E78.49 OTHER HYPERLIPIDEMIA: ICD-10-CM

## 2020-12-14 DIAGNOSIS — N39.0 RECURRENT UTI: ICD-10-CM

## 2020-12-14 DIAGNOSIS — I48.20 CHRONIC ATRIAL FIBRILLATION: ICD-10-CM

## 2020-12-14 DIAGNOSIS — I10 ESSENTIAL HYPERTENSION: ICD-10-CM

## 2020-12-14 DIAGNOSIS — N32.3 BLADDER DIVERTICULUM: Primary | ICD-10-CM

## 2020-12-14 DIAGNOSIS — N28.89 RENAL MASS, LEFT: ICD-10-CM

## 2020-12-14 PROCEDURE — 99214 PR OFFICE/OUTPT VISIT, EST, LEVL IV, 30-39 MIN: ICD-10-PCS | Mod: S$PBB,,, | Performed by: STUDENT IN AN ORGANIZED HEALTH CARE EDUCATION/TRAINING PROGRAM

## 2020-12-14 PROCEDURE — 99999 PR PBB SHADOW E&M-EST. PATIENT-LVL V: ICD-10-PCS | Mod: PBBFAC,,, | Performed by: STUDENT IN AN ORGANIZED HEALTH CARE EDUCATION/TRAINING PROGRAM

## 2020-12-14 PROCEDURE — 99214 OFFICE O/P EST MOD 30 MIN: CPT | Mod: S$PBB,,, | Performed by: STUDENT IN AN ORGANIZED HEALTH CARE EDUCATION/TRAINING PROGRAM

## 2020-12-14 PROCEDURE — 99215 OFFICE O/P EST HI 40 MIN: CPT | Mod: PBBFAC,PN | Performed by: STUDENT IN AN ORGANIZED HEALTH CARE EDUCATION/TRAINING PROGRAM

## 2020-12-14 PROCEDURE — 99999 PR PBB SHADOW E&M-EST. PATIENT-LVL V: CPT | Mod: PBBFAC,,, | Performed by: STUDENT IN AN ORGANIZED HEALTH CARE EDUCATION/TRAINING PROGRAM

## 2020-12-14 NOTE — PATIENT INSTRUCTIONS
Drink 2 liters of water a day.   Probiotic  Cranberry supplement with 36 mg PAC  Bowel movement daily  Vaginal estrogen

## 2020-12-17 ENCOUNTER — TELEPHONE (OUTPATIENT)
Dept: UROLOGY | Facility: CLINIC | Age: 85
End: 2020-12-17

## 2020-12-17 ENCOUNTER — CLINICAL SUPPORT (OUTPATIENT)
Dept: UROLOGY | Facility: CLINIC | Age: 85
End: 2020-12-17
Payer: MEDICARE

## 2020-12-17 ENCOUNTER — APPOINTMENT (OUTPATIENT)
Dept: LAB | Facility: HOSPITAL | Age: 85
End: 2020-12-17
Attending: STUDENT IN AN ORGANIZED HEALTH CARE EDUCATION/TRAINING PROGRAM
Payer: MEDICARE

## 2020-12-17 DIAGNOSIS — R30.0 DYSURIA: Primary | ICD-10-CM

## 2020-12-17 LAB
BACTERIA #/AREA URNS HPF: NORMAL /HPF
BILIRUB SERPL-MCNC: NEGATIVE MG/DL
BILIRUB UR QL STRIP: NEGATIVE
BLOOD URINE, POC: NEGATIVE
CLARITY UR: CLEAR
CLARITY, POC UA: CLEAR
COLOR UR: YELLOW
COLOR, POC UA: YELLOW
GLUCOSE UR QL STRIP: NEGATIVE
GLUCOSE UR QL STRIP: NEGATIVE
HGB UR QL STRIP: NEGATIVE
KETONES UR QL STRIP: NEGATIVE
KETONES UR QL STRIP: NEGATIVE
LEUKOCYTE ESTERASE UR QL STRIP: ABNORMAL
LEUKOCYTE ESTERASE URINE, POC: NEGATIVE
MICROSCOPIC COMMENT: NORMAL
NITRITE UR QL STRIP: NEGATIVE
NITRITE, POC UA: NEGATIVE
PH UR STRIP: 7 [PH] (ref 5–8)
PH, POC UA: 6.5
PROT UR QL STRIP: NEGATIVE
PROTEIN, POC: NEGATIVE
SP GR UR STRIP: 1.01 (ref 1–1.03)
SPECIFIC GRAVITY, POC UA: 1.01
SQUAMOUS #/AREA URNS HPF: 10 /HPF
URN SPEC COLLECT METH UR: ABNORMAL
UROBILINOGEN UR STRIP-ACNC: NEGATIVE EU/DL
UROBILINOGEN, POC UA: 0.2
WBC #/AREA URNS HPF: 3 /HPF (ref 0–5)

## 2020-12-17 PROCEDURE — 81002 URINALYSIS NONAUTO W/O SCOPE: CPT | Mod: PBBFAC,PN

## 2020-12-17 PROCEDURE — 87186 SC STD MICRODIL/AGAR DIL: CPT | Mod: 59

## 2020-12-17 PROCEDURE — 87088 URINE BACTERIA CULTURE: CPT

## 2020-12-17 PROCEDURE — 87086 URINE CULTURE/COLONY COUNT: CPT

## 2020-12-17 PROCEDURE — 81000 URINALYSIS NONAUTO W/SCOPE: CPT

## 2020-12-17 PROCEDURE — 99499 UNLISTED E&M SERVICE: CPT | Mod: S$PBB,,, | Performed by: STUDENT IN AN ORGANIZED HEALTH CARE EDUCATION/TRAINING PROGRAM

## 2020-12-17 PROCEDURE — 99499 NO LOS: ICD-10-PCS | Mod: S$PBB,,, | Performed by: STUDENT IN AN ORGANIZED HEALTH CARE EDUCATION/TRAINING PROGRAM

## 2020-12-17 PROCEDURE — 87077 CULTURE AEROBIC IDENTIFY: CPT

## 2020-12-17 NOTE — TELEPHONE ENCOUNTER
----- Message from Navin Barrera sent at 12/17/2020 11:45 AM CST -----  Regarding: advice  Contact: self  Type:  Same Day Appointment Request    Caller is requesting a same day appointment.  Caller declined first available appointment listed below.      Name of Caller: self   When is the first available appointment? Yesterday   Symptoms:  nausea, back pain, frequent urination  Best Call Back Number:  [phone]   Additional Information:   Patient requesting to dropped off urine sample for uti.

## 2020-12-17 NOTE — TELEPHONE ENCOUNTER
I tried calling the pt and the home phone is continuously busy therefore I left a message on the voicemail of the cell phone number listed in the chart.

## 2020-12-17 NOTE — PROGRESS NOTES
Patient arrived to clinic to give urine sample for testing. Given clean catch, POCT done, specimen prepared for lab .

## 2020-12-21 ENCOUNTER — TELEPHONE (OUTPATIENT)
Dept: UROLOGY | Facility: CLINIC | Age: 85
End: 2020-12-21

## 2020-12-21 LAB
BACTERIA UR CULT: ABNORMAL
BACTERIA UR CULT: ABNORMAL

## 2020-12-21 RX ORDER — NITROFURANTOIN 25; 75 MG/1; MG/1
100 CAPSULE ORAL 2 TIMES DAILY
Qty: 10 CAPSULE | Refills: 0 | Status: SHIPPED | OUTPATIENT
Start: 2020-12-21 | End: 2020-12-26

## 2020-12-21 RX ORDER — DOXYCYCLINE 100 MG/1
100 CAPSULE ORAL EVERY 12 HOURS
Qty: 10 CAPSULE | Refills: 0 | Status: SHIPPED | OUTPATIENT
Start: 2020-12-21 | End: 2020-12-26

## 2020-12-21 NOTE — TELEPHONE ENCOUNTER
Spoke with patient, results given and informed of antibiotic being sent to her pharmacy, patient verbally understood.

## 2020-12-23 ENCOUNTER — TELEPHONE (OUTPATIENT)
Dept: FAMILY MEDICINE | Facility: CLINIC | Age: 85
End: 2020-12-23

## 2020-12-23 NOTE — TELEPHONE ENCOUNTER
Spoke to patient- she hugged her  at [facility] on Sunday 12/20- since then her  has become positive for covid- patient is asymptomatic- advised to quarantine for at least 7 days and call if becomes symptomatic.

## 2020-12-23 NOTE — TELEPHONE ENCOUNTER
----- Message from Suha Sanchez sent at 12/23/2020  8:51 AM CST -----  Regarding: advice  Contact: ELIAS CAGLE [[id number]]  patient called stated she was in close contact with someone who has tested positive for covid.  Should patient get tested?  Please call upon request [phone]

## 2021-01-09 ENCOUNTER — IMMUNIZATION (OUTPATIENT)
Dept: PRIMARY CARE CLINIC | Facility: CLINIC | Age: 86
End: 2021-01-09
Payer: MEDICARE

## 2021-01-09 DIAGNOSIS — Z23 NEED FOR VACCINATION: ICD-10-CM

## 2021-01-09 PROCEDURE — 91300 COVID-19, MRNA, LNP-S, PF, 30 MCG/0.3 ML DOSE VACCINE: CPT | Mod: S$GLB,,, | Performed by: FAMILY MEDICINE

## 2021-01-09 PROCEDURE — 91300 COVID-19, MRNA, LNP-S, PF, 30 MCG/0.3 ML DOSE VACCINE: ICD-10-PCS | Mod: S$GLB,,, | Performed by: FAMILY MEDICINE

## 2021-01-09 PROCEDURE — 0001A COVID-19, MRNA, LNP-S, PF, 30 MCG/0.3 ML DOSE VACCINE: ICD-10-PCS | Mod: S$GLB,,, | Performed by: FAMILY MEDICINE

## 2021-01-09 PROCEDURE — 0001A COVID-19, MRNA, LNP-S, PF, 30 MCG/0.3 ML DOSE VACCINE: CPT | Mod: S$GLB,,, | Performed by: FAMILY MEDICINE

## 2021-01-15 ENCOUNTER — TELEPHONE (OUTPATIENT)
Dept: FAMILY MEDICINE | Facility: CLINIC | Age: 86
End: 2021-01-15

## 2021-01-15 ENCOUNTER — LAB VISIT (OUTPATIENT)
Dept: LAB | Facility: HOSPITAL | Age: 86
End: 2021-01-15
Attending: FAMILY MEDICINE
Payer: MEDICARE

## 2021-01-15 DIAGNOSIS — R35.0 URINARY FREQUENCY: ICD-10-CM

## 2021-01-15 DIAGNOSIS — N30.00 ACUTE CYSTITIS WITHOUT HEMATURIA: Primary | ICD-10-CM

## 2021-01-15 DIAGNOSIS — R30.0 BURNING WITH URINATION: ICD-10-CM

## 2021-01-15 DIAGNOSIS — R30.0 BURNING WITH URINATION: Primary | ICD-10-CM

## 2021-01-15 LAB
AMORPH CRY UR QL COMP ASSIST: ABNORMAL
BACTERIA #/AREA URNS AUTO: ABNORMAL /HPF
BILIRUB UR QL STRIP: NEGATIVE
CLARITY UR: ABNORMAL
COLOR UR: YELLOW
GLUCOSE UR QL STRIP: NEGATIVE
HGB UR QL STRIP: NEGATIVE
KETONES UR QL STRIP: NEGATIVE
LEUKOCYTE ESTERASE UR QL STRIP: ABNORMAL
MICROSCOPIC COMMENT: ABNORMAL
NITRITE UR QL STRIP: NEGATIVE
PH UR STRIP: 8 [PH] (ref 5–8)
PROT UR QL STRIP: NEGATIVE
RBC #/AREA URNS AUTO: 1 /HPF (ref 0–4)
SP GR UR STRIP: 1 (ref 1–1.03)
SQUAMOUS #/AREA URNS AUTO: 1 /HPF
URN SPEC COLLECT METH UR: ABNORMAL
WBC #/AREA URNS AUTO: 36 /HPF (ref 0–5)
WBC CLUMPS UR QL AUTO: ABNORMAL

## 2021-01-15 PROCEDURE — 87088 URINE BACTERIA CULTURE: CPT

## 2021-01-15 PROCEDURE — 87186 SC STD MICRODIL/AGAR DIL: CPT

## 2021-01-15 PROCEDURE — 87086 URINE CULTURE/COLONY COUNT: CPT

## 2021-01-15 PROCEDURE — 81000 URINALYSIS NONAUTO W/SCOPE: CPT | Mod: PO

## 2021-01-15 PROCEDURE — 87077 CULTURE AEROBIC IDENTIFY: CPT

## 2021-01-15 RX ORDER — AMOXICILLIN AND CLAVULANATE POTASSIUM 500; 125 MG/1; MG/1
1 TABLET, FILM COATED ORAL 2 TIMES DAILY
Qty: 14 TABLET | Refills: 0 | Status: SHIPPED | OUTPATIENT
Start: 2021-01-15 | End: 2021-01-22

## 2021-01-17 ENCOUNTER — LAB VISIT (OUTPATIENT)
Dept: PRIMARY CARE CLINIC | Facility: CLINIC | Age: 86
End: 2021-01-17
Payer: MEDICARE

## 2021-01-17 DIAGNOSIS — N39.0 RECURRENT UTI: ICD-10-CM

## 2021-01-17 PROCEDURE — U0003 INFECTIOUS AGENT DETECTION BY NUCLEIC ACID (DNA OR RNA); SEVERE ACUTE RESPIRATORY SYNDROME CORONAVIRUS 2 (SARS-COV-2) (CORONAVIRUS DISEASE [COVID-19]), AMPLIFIED PROBE TECHNIQUE, MAKING USE OF HIGH THROUGHPUT TECHNOLOGIES AS DESCRIBED BY CMS-2020-01-R: HCPCS

## 2021-01-18 LAB
BACTERIA UR CULT: ABNORMAL
SARS-COV-2 RNA RESP QL NAA+PROBE: NOT DETECTED

## 2021-01-20 ENCOUNTER — TELEPHONE (OUTPATIENT)
Dept: UROLOGY | Facility: CLINIC | Age: 86
End: 2021-01-20

## 2021-01-20 ENCOUNTER — HOSPITAL ENCOUNTER (OUTPATIENT)
Facility: AMBULARY SURGERY CENTER | Age: 86
Discharge: HOME | End: 2021-01-20
Attending: STUDENT IN AN ORGANIZED HEALTH CARE EDUCATION/TRAINING PROGRAM | Admitting: STUDENT IN AN ORGANIZED HEALTH CARE EDUCATION/TRAINING PROGRAM
Payer: MEDICARE

## 2021-01-20 ENCOUNTER — TELEPHONE (OUTPATIENT)
Dept: FAMILY MEDICINE | Facility: CLINIC | Age: 86
End: 2021-01-20

## 2021-01-20 DIAGNOSIS — N39.0 RECURRENT UTI: ICD-10-CM

## 2021-01-20 PROCEDURE — 52000 CYSTOURETHROSCOPY: CPT | Performed by: STUDENT IN AN ORGANIZED HEALTH CARE EDUCATION/TRAINING PROGRAM

## 2021-01-20 PROCEDURE — 52000 PR CYSTOURETHROSCOPY: ICD-10-PCS | Mod: ,,, | Performed by: STUDENT IN AN ORGANIZED HEALTH CARE EDUCATION/TRAINING PROGRAM

## 2021-01-20 PROCEDURE — 52000 CYSTOURETHROSCOPY: CPT | Mod: ,,, | Performed by: STUDENT IN AN ORGANIZED HEALTH CARE EDUCATION/TRAINING PROGRAM

## 2021-01-20 RX ORDER — ESTRADIOL 0.1 MG/G
2 CREAM VAGINAL
Qty: 16 G | Refills: 11 | Status: SHIPPED | OUTPATIENT
Start: 2021-01-21 | End: 2023-10-31

## 2021-01-20 RX ORDER — LIDOCAINE HYDROCHLORIDE 20 MG/ML
JELLY TOPICAL
Status: DISCONTINUED | OUTPATIENT
Start: 2021-01-20 | End: 2021-01-20 | Stop reason: HOSPADM

## 2021-01-20 RX ORDER — WATER 1000 ML/1000ML
INJECTION, SOLUTION INTRAVENOUS
Status: DISCONTINUED | OUTPATIENT
Start: 2021-01-20 | End: 2021-01-20 | Stop reason: HOSPADM

## 2021-01-21 VITALS
SYSTOLIC BLOOD PRESSURE: 138 MMHG | DIASTOLIC BLOOD PRESSURE: 76 MMHG | BODY MASS INDEX: 22.2 KG/M2 | RESPIRATION RATE: 18 BRPM | TEMPERATURE: 98 F | WEIGHT: 130 LBS | HEART RATE: 78 BPM | OXYGEN SATURATION: 96 % | HEIGHT: 64 IN

## 2021-01-30 ENCOUNTER — IMMUNIZATION (OUTPATIENT)
Dept: PRIMARY CARE CLINIC | Facility: CLINIC | Age: 86
End: 2021-01-30
Payer: MEDICARE

## 2021-01-30 DIAGNOSIS — Z23 NEED FOR VACCINATION: Primary | ICD-10-CM

## 2021-01-30 PROCEDURE — 0002A COVID-19, MRNA, LNP-S, PF, 30 MCG/0.3 ML DOSE VACCINE: ICD-10-PCS | Mod: S$GLB,,, | Performed by: FAMILY MEDICINE

## 2021-01-30 PROCEDURE — 0002A COVID-19, MRNA, LNP-S, PF, 30 MCG/0.3 ML DOSE VACCINE: CPT | Mod: S$GLB,,, | Performed by: FAMILY MEDICINE

## 2021-01-30 PROCEDURE — 91300 COVID-19, MRNA, LNP-S, PF, 30 MCG/0.3 ML DOSE VACCINE: CPT | Mod: S$GLB,,, | Performed by: FAMILY MEDICINE

## 2021-01-30 PROCEDURE — 91300 COVID-19, MRNA, LNP-S, PF, 30 MCG/0.3 ML DOSE VACCINE: ICD-10-PCS | Mod: S$GLB,,, | Performed by: FAMILY MEDICINE

## 2021-02-11 ENCOUNTER — TELEPHONE (OUTPATIENT)
Dept: FAMILY MEDICINE | Facility: CLINIC | Age: 86
End: 2021-02-11

## 2021-03-16 DIAGNOSIS — I10 ESSENTIAL HYPERTENSION: ICD-10-CM

## 2021-03-18 RX ORDER — LISINOPRIL 40 MG/1
TABLET ORAL
Qty: 90 TABLET | Refills: 0 | Status: SHIPPED | OUTPATIENT
Start: 2021-03-18 | End: 2021-05-10

## 2021-03-22 DIAGNOSIS — E03.9 ACQUIRED HYPOTHYROIDISM: ICD-10-CM

## 2021-03-22 DIAGNOSIS — I10 ESSENTIAL HYPERTENSION: Primary | ICD-10-CM

## 2021-03-24 RX ORDER — LEVOTHYROXINE SODIUM 112 UG/1
TABLET ORAL
Qty: 45 TABLET | Refills: 0 | Status: SHIPPED | OUTPATIENT
Start: 2021-03-24 | End: 2021-05-10

## 2021-03-26 ENCOUNTER — LAB VISIT (OUTPATIENT)
Dept: LAB | Facility: HOSPITAL | Age: 86
End: 2021-03-26
Attending: FAMILY MEDICINE
Payer: MEDICARE

## 2021-03-26 DIAGNOSIS — I10 ESSENTIAL HYPERTENSION: ICD-10-CM

## 2021-03-26 DIAGNOSIS — E03.9 ACQUIRED HYPOTHYROIDISM: ICD-10-CM

## 2021-03-26 LAB
ALBUMIN SERPL BCP-MCNC: 4.1 G/DL (ref 3.5–5.2)
ALP SERPL-CCNC: 88 U/L (ref 55–135)
ALT SERPL W/O P-5'-P-CCNC: 13 U/L (ref 10–44)
ANION GAP SERPL CALC-SCNC: 7 MMOL/L (ref 8–16)
AST SERPL-CCNC: 25 U/L (ref 10–40)
BILIRUB SERPL-MCNC: 1 MG/DL (ref 0.1–1)
BUN SERPL-MCNC: 9 MG/DL (ref 10–30)
CALCIUM SERPL-MCNC: 9.9 MG/DL (ref 8.7–10.5)
CHLORIDE SERPL-SCNC: 106 MMOL/L (ref 95–110)
CHOLEST SERPL-MCNC: 142 MG/DL (ref 120–199)
CHOLEST/HDLC SERPL: 2.2 {RATIO} (ref 2–5)
CO2 SERPL-SCNC: 26 MMOL/L (ref 23–29)
CREAT SERPL-MCNC: 0.8 MG/DL (ref 0.5–1.4)
EST. GFR  (AFRICAN AMERICAN): >60 ML/MIN/1.73 M^2
EST. GFR  (NON AFRICAN AMERICAN): >60 ML/MIN/1.73 M^2
GLUCOSE SERPL-MCNC: 104 MG/DL (ref 70–110)
HDLC SERPL-MCNC: 65 MG/DL (ref 40–75)
HDLC SERPL: 45.8 % (ref 20–50)
LDLC SERPL CALC-MCNC: 66.6 MG/DL (ref 63–159)
NONHDLC SERPL-MCNC: 77 MG/DL
POTASSIUM SERPL-SCNC: 4 MMOL/L (ref 3.5–5.1)
PROT SERPL-MCNC: 7.1 G/DL (ref 6–8.4)
SODIUM SERPL-SCNC: 139 MMOL/L (ref 136–145)
TRIGL SERPL-MCNC: 52 MG/DL (ref 30–150)
TSH SERPL DL<=0.005 MIU/L-ACNC: 1.86 UIU/ML (ref 0.4–4)

## 2021-03-26 PROCEDURE — 80053 COMPREHEN METABOLIC PANEL: CPT | Performed by: FAMILY MEDICINE

## 2021-03-26 PROCEDURE — 84443 ASSAY THYROID STIM HORMONE: CPT | Performed by: FAMILY MEDICINE

## 2021-03-26 PROCEDURE — 36415 COLL VENOUS BLD VENIPUNCTURE: CPT | Mod: PO | Performed by: FAMILY MEDICINE

## 2021-03-26 PROCEDURE — 80061 LIPID PANEL: CPT | Performed by: FAMILY MEDICINE

## 2021-04-21 ENCOUNTER — PATIENT OUTREACH (OUTPATIENT)
Dept: ADMINISTRATIVE | Facility: OTHER | Age: 86
End: 2021-04-21

## 2021-04-23 ENCOUNTER — LAB VISIT (OUTPATIENT)
Dept: LAB | Facility: HOSPITAL | Age: 86
End: 2021-04-23
Attending: STUDENT IN AN ORGANIZED HEALTH CARE EDUCATION/TRAINING PROGRAM
Payer: MEDICARE

## 2021-04-23 ENCOUNTER — OFFICE VISIT (OUTPATIENT)
Dept: FAMILY MEDICINE | Facility: CLINIC | Age: 86
End: 2021-04-23
Payer: MEDICARE

## 2021-04-23 VITALS
WEIGHT: 144.81 LBS | BODY MASS INDEX: 24.72 KG/M2 | HEART RATE: 78 BPM | OXYGEN SATURATION: 16 % | RESPIRATION RATE: 16 BRPM | TEMPERATURE: 98 F | HEIGHT: 64 IN | SYSTOLIC BLOOD PRESSURE: 132 MMHG | DIASTOLIC BLOOD PRESSURE: 68 MMHG

## 2021-04-23 DIAGNOSIS — E03.9 ACQUIRED HYPOTHYROIDISM: ICD-10-CM

## 2021-04-23 DIAGNOSIS — D53.9 MACROCYTIC ANEMIA: ICD-10-CM

## 2021-04-23 DIAGNOSIS — E78.49 OTHER HYPERLIPIDEMIA: ICD-10-CM

## 2021-04-23 DIAGNOSIS — I10 ESSENTIAL HYPERTENSION: ICD-10-CM

## 2021-04-23 DIAGNOSIS — N39.0 URINARY TRACT INFECTION WITHOUT HEMATURIA, SITE UNSPECIFIED: Primary | ICD-10-CM

## 2021-04-23 LAB
BILIRUB SERPL-MCNC: ABNORMAL MG/DL
BLOOD URINE, POC: 50
CLARITY, POC UA: ABNORMAL
COLOR, POC UA: ABNORMAL
GLUCOSE UR QL STRIP: NORMAL
KETONES UR QL STRIP: ABNORMAL
LEUKOCYTE ESTERASE URINE, POC: ABNORMAL
NITRITE, POC UA: POSITIVE
PH, POC UA: 5
PROTEIN, POC: ABNORMAL
SPECIFIC GRAVITY, POC UA: 1.01
UROBILINOGEN, POC UA: 8

## 2021-04-23 PROCEDURE — 87186 SC STD MICRODIL/AGAR DIL: CPT | Performed by: STUDENT IN AN ORGANIZED HEALTH CARE EDUCATION/TRAINING PROGRAM

## 2021-04-23 PROCEDURE — 99214 PR OFFICE/OUTPT VISIT, EST, LEVL IV, 30-39 MIN: ICD-10-PCS | Mod: S$PBB,,, | Performed by: STUDENT IN AN ORGANIZED HEALTH CARE EDUCATION/TRAINING PROGRAM

## 2021-04-23 PROCEDURE — 99214 OFFICE O/P EST MOD 30 MIN: CPT | Mod: S$PBB,,, | Performed by: STUDENT IN AN ORGANIZED HEALTH CARE EDUCATION/TRAINING PROGRAM

## 2021-04-23 PROCEDURE — 82607 VITAMIN B-12: CPT | Performed by: STUDENT IN AN ORGANIZED HEALTH CARE EDUCATION/TRAINING PROGRAM

## 2021-04-23 PROCEDURE — 81001 URINALYSIS AUTO W/SCOPE: CPT | Performed by: STUDENT IN AN ORGANIZED HEALTH CARE EDUCATION/TRAINING PROGRAM

## 2021-04-23 PROCEDURE — 87088 URINE BACTERIA CULTURE: CPT | Performed by: STUDENT IN AN ORGANIZED HEALTH CARE EDUCATION/TRAINING PROGRAM

## 2021-04-23 PROCEDURE — 99999 PR PBB SHADOW E&M-EST. PATIENT-LVL V: CPT | Mod: PBBFAC,,, | Performed by: STUDENT IN AN ORGANIZED HEALTH CARE EDUCATION/TRAINING PROGRAM

## 2021-04-23 PROCEDURE — 36415 COLL VENOUS BLD VENIPUNCTURE: CPT | Mod: PO | Performed by: STUDENT IN AN ORGANIZED HEALTH CARE EDUCATION/TRAINING PROGRAM

## 2021-04-23 PROCEDURE — 87086 URINE CULTURE/COLONY COUNT: CPT | Performed by: STUDENT IN AN ORGANIZED HEALTH CARE EDUCATION/TRAINING PROGRAM

## 2021-04-23 PROCEDURE — 99215 OFFICE O/P EST HI 40 MIN: CPT | Mod: PBBFAC,PO | Performed by: STUDENT IN AN ORGANIZED HEALTH CARE EDUCATION/TRAINING PROGRAM

## 2021-04-23 PROCEDURE — 99999 PR PBB SHADOW E&M-EST. PATIENT-LVL V: ICD-10-PCS | Mod: PBBFAC,,, | Performed by: STUDENT IN AN ORGANIZED HEALTH CARE EDUCATION/TRAINING PROGRAM

## 2021-04-23 PROCEDURE — 81002 URINALYSIS NONAUTO W/O SCOPE: CPT | Mod: PBBFAC,PO | Performed by: STUDENT IN AN ORGANIZED HEALTH CARE EDUCATION/TRAINING PROGRAM

## 2021-04-23 PROCEDURE — 87077 CULTURE AEROBIC IDENTIFY: CPT | Performed by: STUDENT IN AN ORGANIZED HEALTH CARE EDUCATION/TRAINING PROGRAM

## 2021-04-23 PROCEDURE — 82746 ASSAY OF FOLIC ACID SERUM: CPT | Performed by: STUDENT IN AN ORGANIZED HEALTH CARE EDUCATION/TRAINING PROGRAM

## 2021-04-23 RX ORDER — LEVOFLOXACIN 500 MG/1
500 TABLET, FILM COATED ORAL DAILY
Qty: 10 TABLET | Refills: 0 | Status: SHIPPED | OUTPATIENT
Start: 2021-04-23 | End: 2021-05-03

## 2021-04-23 RX ORDER — CLOBETASOL PROPIONATE 0.46 MG/ML
SOLUTION TOPICAL
Status: ON HOLD | COMMUNITY
Start: 2021-03-17 | End: 2022-08-30 | Stop reason: HOSPADM

## 2021-04-23 RX ORDER — OMEPRAZOLE 20 MG/1
CAPSULE, DELAYED RELEASE ORAL
COMMUNITY
Start: 2021-02-09 | End: 2021-05-19

## 2021-04-23 RX ORDER — CLOBETASOL PROPIONATE 0.05 G/100ML
SHAMPOO TOPICAL
Status: ON HOLD | COMMUNITY
Start: 2021-03-03 | End: 2022-08-30 | Stop reason: HOSPADM

## 2021-04-24 LAB
BACTERIA #/AREA URNS AUTO: ABNORMAL /HPF
BILIRUB UR QL STRIP: NEGATIVE
CLARITY UR REFRACT.AUTO: ABNORMAL
COLOR UR AUTO: ABNORMAL
FOLATE SERPL-MCNC: 16 NG/ML (ref 4–24)
GLUCOSE UR QL STRIP: NEGATIVE
HGB UR QL STRIP: ABNORMAL
HYALINE CASTS UR QL AUTO: 7 /LPF
KETONES UR QL STRIP: NEGATIVE
LEUKOCYTE ESTERASE UR QL STRIP: ABNORMAL
MICROSCOPIC COMMENT: ABNORMAL
NITRITE UR QL STRIP: POSITIVE
PH UR STRIP: 6 [PH] (ref 5–8)
PROT UR QL STRIP: ABNORMAL
RBC #/AREA URNS AUTO: 9 /HPF (ref 0–4)
SP GR UR STRIP: 1.01 (ref 1–1.03)
SQUAMOUS #/AREA URNS AUTO: 5 /HPF
URN SPEC COLLECT METH UR: ABNORMAL
VIT B12 SERPL-MCNC: 514 PG/ML (ref 210–950)
WBC #/AREA URNS AUTO: >100 /HPF (ref 0–5)
WBC CLUMPS UR QL AUTO: ABNORMAL

## 2021-04-26 LAB — BACTERIA UR CULT: ABNORMAL

## 2021-05-10 ENCOUNTER — OFFICE VISIT (OUTPATIENT)
Dept: FAMILY MEDICINE | Facility: CLINIC | Age: 86
End: 2021-05-10
Attending: FAMILY MEDICINE
Payer: MEDICARE

## 2021-05-10 VITALS
OXYGEN SATURATION: 96 % | WEIGHT: 144.63 LBS | HEART RATE: 88 BPM | HEIGHT: 64 IN | SYSTOLIC BLOOD PRESSURE: 130 MMHG | TEMPERATURE: 99 F | BODY MASS INDEX: 24.69 KG/M2 | DIASTOLIC BLOOD PRESSURE: 72 MMHG

## 2021-05-10 DIAGNOSIS — I10 ESSENTIAL HYPERTENSION: Primary | ICD-10-CM

## 2021-05-10 DIAGNOSIS — J31.0 CHRONIC RHINITIS: ICD-10-CM

## 2021-05-10 DIAGNOSIS — E78.49 OTHER HYPERLIPIDEMIA: ICD-10-CM

## 2021-05-10 DIAGNOSIS — E03.9 ACQUIRED HYPOTHYROIDISM: ICD-10-CM

## 2021-05-10 PROCEDURE — 99999 PR PBB SHADOW E&M-EST. PATIENT-LVL IV: CPT | Mod: PBBFAC,,, | Performed by: FAMILY MEDICINE

## 2021-05-10 PROCEDURE — 99999 PR PBB SHADOW E&M-EST. PATIENT-LVL IV: ICD-10-PCS | Mod: PBBFAC,,, | Performed by: FAMILY MEDICINE

## 2021-05-10 PROCEDURE — 99214 PR OFFICE/OUTPT VISIT, EST, LEVL IV, 30-39 MIN: ICD-10-PCS | Mod: S$PBB,,, | Performed by: FAMILY MEDICINE

## 2021-05-10 PROCEDURE — 99214 OFFICE O/P EST MOD 30 MIN: CPT | Mod: S$PBB,,, | Performed by: FAMILY MEDICINE

## 2021-05-10 PROCEDURE — 99214 OFFICE O/P EST MOD 30 MIN: CPT | Mod: PBBFAC,PO | Performed by: FAMILY MEDICINE

## 2021-05-10 RX ORDER — AMLODIPINE BESYLATE 2.5 MG/1
2.5 TABLET ORAL DAILY
Qty: 90 TABLET | Refills: 3 | Status: SHIPPED | OUTPATIENT
Start: 2021-05-10 | End: 2023-10-31

## 2021-05-10 RX ORDER — ATENOLOL 50 MG/1
50 TABLET ORAL 2 TIMES DAILY
Qty: 180 TABLET | Refills: 3 | Status: SHIPPED | OUTPATIENT
Start: 2021-05-10 | End: 2022-05-13

## 2021-05-10 RX ORDER — LISINOPRIL 40 MG/1
40 TABLET ORAL DAILY
Qty: 90 TABLET | Refills: 1 | Status: SHIPPED | OUTPATIENT
Start: 2021-05-10 | End: 2021-12-27

## 2021-05-10 RX ORDER — AZELASTINE 1 MG/ML
1 SPRAY, METERED NASAL 2 TIMES DAILY PRN
Qty: 30 ML | Refills: 11 | Status: SHIPPED | OUTPATIENT
Start: 2021-05-10 | End: 2023-10-31

## 2021-05-10 RX ORDER — LEVOTHYROXINE SODIUM 112 UG/1
56 TABLET ORAL DAILY
Qty: 45 TABLET | Refills: 3 | Status: SHIPPED | OUTPATIENT
Start: 2021-05-10 | End: 2022-06-01

## 2021-06-24 ENCOUNTER — OFFICE VISIT (OUTPATIENT)
Dept: FAMILY MEDICINE | Facility: CLINIC | Age: 86
End: 2021-06-24
Attending: FAMILY MEDICINE
Payer: MEDICARE

## 2021-06-24 VITALS
DIASTOLIC BLOOD PRESSURE: 74 MMHG | SYSTOLIC BLOOD PRESSURE: 134 MMHG | BODY MASS INDEX: 24.62 KG/M2 | HEART RATE: 78 BPM | OXYGEN SATURATION: 94 % | HEIGHT: 64 IN | WEIGHT: 144.19 LBS | TEMPERATURE: 99 F

## 2021-06-24 DIAGNOSIS — R39.9 UTI SYMPTOMS: ICD-10-CM

## 2021-06-24 DIAGNOSIS — N30.00 ACUTE CYSTITIS WITHOUT HEMATURIA: Primary | ICD-10-CM

## 2021-06-24 DIAGNOSIS — R30.0 DYSURIA: Primary | ICD-10-CM

## 2021-06-24 LAB
BILIRUB SERPL-MCNC: NEGATIVE MG/DL
BLOOD URINE, POC: NEGATIVE
CLARITY, POC UA: CLEAR
COLOR, POC UA: YELLOW
GLUCOSE UR QL STRIP: NEGATIVE
KETONES UR QL STRIP: NEGATIVE
LEUKOCYTE ESTERASE URINE, POC: POSITIVE
NITRITE, POC UA: NEGATIVE
PH, POC UA: 5
PROTEIN, POC: ABNORMAL
SPECIFIC GRAVITY, POC UA: 1.01
UROBILINOGEN, POC UA: NEGATIVE

## 2021-06-24 PROCEDURE — 87086 URINE CULTURE/COLONY COUNT: CPT | Performed by: FAMILY MEDICINE

## 2021-06-24 PROCEDURE — 99214 OFFICE O/P EST MOD 30 MIN: CPT | Mod: PBBFAC,PO | Performed by: FAMILY MEDICINE

## 2021-06-24 PROCEDURE — 87186 SC STD MICRODIL/AGAR DIL: CPT | Performed by: FAMILY MEDICINE

## 2021-06-24 PROCEDURE — 87077 CULTURE AEROBIC IDENTIFY: CPT | Performed by: FAMILY MEDICINE

## 2021-06-24 PROCEDURE — 81002 URINALYSIS NONAUTO W/O SCOPE: CPT | Mod: PBBFAC,PO | Performed by: FAMILY MEDICINE

## 2021-06-24 PROCEDURE — 99999 PR PBB SHADOW E&M-EST. PATIENT-LVL IV: ICD-10-PCS | Mod: PBBFAC,,, | Performed by: FAMILY MEDICINE

## 2021-06-24 PROCEDURE — 99213 OFFICE O/P EST LOW 20 MIN: CPT | Mod: S$PBB,,, | Performed by: FAMILY MEDICINE

## 2021-06-24 PROCEDURE — 99999 PR PBB SHADOW E&M-EST. PATIENT-LVL IV: CPT | Mod: PBBFAC,,, | Performed by: FAMILY MEDICINE

## 2021-06-24 PROCEDURE — 87088 URINE BACTERIA CULTURE: CPT | Performed by: FAMILY MEDICINE

## 2021-06-24 PROCEDURE — 99213 PR OFFICE/OUTPT VISIT, EST, LEVL III, 20-29 MIN: ICD-10-PCS | Mod: S$PBB,,, | Performed by: FAMILY MEDICINE

## 2021-06-24 RX ORDER — AMOXICILLIN AND CLAVULANATE POTASSIUM 500; 125 MG/1; MG/1
1 TABLET, FILM COATED ORAL 2 TIMES DAILY
Qty: 14 TABLET | Refills: 0 | Status: SHIPPED | OUTPATIENT
Start: 2021-06-24 | End: 2021-07-01

## 2021-06-24 RX ORDER — PHENAZOPYRIDINE HYDROCHLORIDE 100 MG/1
100 TABLET, FILM COATED ORAL 3 TIMES DAILY PRN
Qty: 20 TABLET | Refills: 1 | Status: SHIPPED | OUTPATIENT
Start: 2021-06-24 | End: 2021-07-04

## 2021-06-27 LAB — BACTERIA UR CULT: ABNORMAL

## 2021-07-14 ENCOUNTER — PES CALL (OUTPATIENT)
Dept: ADMINISTRATIVE | Facility: CLINIC | Age: 86
End: 2021-07-14

## 2021-09-04 ENCOUNTER — NURSE TRIAGE (OUTPATIENT)
Dept: ADMINISTRATIVE | Facility: CLINIC | Age: 86
End: 2021-09-04

## 2021-09-08 DIAGNOSIS — E78.2 MIXED HYPERLIPIDEMIA: ICD-10-CM

## 2021-09-08 RX ORDER — SIMVASTATIN 20 MG/1
20 TABLET, FILM COATED ORAL NIGHTLY
Qty: 30 TABLET | Refills: 5 | Status: SHIPPED | OUTPATIENT
Start: 2021-09-08 | End: 2022-03-15

## 2021-10-05 ENCOUNTER — TELEPHONE (OUTPATIENT)
Dept: FAMILY MEDICINE | Facility: CLINIC | Age: 86
End: 2021-10-05

## 2021-10-05 ENCOUNTER — OFFICE VISIT (OUTPATIENT)
Dept: FAMILY MEDICINE | Facility: CLINIC | Age: 86
End: 2021-10-05
Payer: MEDICARE

## 2021-10-05 VITALS
HEART RATE: 86 BPM | TEMPERATURE: 98 F | BODY MASS INDEX: 24.28 KG/M2 | DIASTOLIC BLOOD PRESSURE: 76 MMHG | HEIGHT: 64 IN | OXYGEN SATURATION: 95 % | WEIGHT: 142.19 LBS | SYSTOLIC BLOOD PRESSURE: 140 MMHG

## 2021-10-05 DIAGNOSIS — R39.9 UTI SYMPTOMS: Primary | ICD-10-CM

## 2021-10-05 DIAGNOSIS — N30.00 ACUTE CYSTITIS WITHOUT HEMATURIA: ICD-10-CM

## 2021-10-05 DIAGNOSIS — J30.9 ALLERGIC RHINITIS, UNSPECIFIED SEASONALITY, UNSPECIFIED TRIGGER: ICD-10-CM

## 2021-10-05 LAB
BACTERIA #/AREA URNS AUTO: ABNORMAL /HPF
BILIRUB UR QL STRIP: NEGATIVE
CLARITY UR REFRACT.AUTO: CLEAR
COLOR UR AUTO: ABNORMAL
GLUCOSE UR QL STRIP: NEGATIVE
HGB UR QL STRIP: NEGATIVE
KETONES UR QL STRIP: NEGATIVE
LEUKOCYTE ESTERASE UR QL STRIP: ABNORMAL
MICROSCOPIC COMMENT: ABNORMAL
NITRITE UR QL STRIP: POSITIVE
PH UR STRIP: 7 [PH] (ref 5–8)
PROT UR QL STRIP: NEGATIVE
RBC #/AREA URNS AUTO: 1 /HPF (ref 0–4)
SP GR UR STRIP: 1 (ref 1–1.03)
SQUAMOUS #/AREA URNS AUTO: 2 /HPF
URN SPEC COLLECT METH UR: ABNORMAL
WBC #/AREA URNS AUTO: 31 /HPF (ref 0–5)

## 2021-10-05 PROCEDURE — 99214 PR OFFICE/OUTPT VISIT, EST, LEVL IV, 30-39 MIN: ICD-10-PCS | Mod: S$PBB,,, | Performed by: NURSE PRACTITIONER

## 2021-10-05 PROCEDURE — 99214 OFFICE O/P EST MOD 30 MIN: CPT | Mod: S$PBB,,, | Performed by: NURSE PRACTITIONER

## 2021-10-05 PROCEDURE — 99999 PR PBB SHADOW E&M-EST. PATIENT-LVL V: CPT | Mod: PBBFAC,,, | Performed by: NURSE PRACTITIONER

## 2021-10-05 PROCEDURE — 81001 URINALYSIS AUTO W/SCOPE: CPT | Performed by: NURSE PRACTITIONER

## 2021-10-05 PROCEDURE — 99999 PR PBB SHADOW E&M-EST. PATIENT-LVL V: ICD-10-PCS | Mod: PBBFAC,,, | Performed by: NURSE PRACTITIONER

## 2021-10-05 PROCEDURE — 99215 OFFICE O/P EST HI 40 MIN: CPT | Mod: PBBFAC,PO | Performed by: NURSE PRACTITIONER

## 2021-10-05 PROCEDURE — 87086 URINE CULTURE/COLONY COUNT: CPT | Performed by: NURSE PRACTITIONER

## 2021-10-05 RX ORDER — LEVOCETIRIZINE DIHYDROCHLORIDE 5 MG/1
5 TABLET, FILM COATED ORAL NIGHTLY
Qty: 30 TABLET | Refills: 11 | Status: SHIPPED | OUTPATIENT
Start: 2021-10-05 | End: 2023-06-20 | Stop reason: SDUPTHER

## 2021-10-05 RX ORDER — LEVOFLOXACIN 500 MG/1
500 TABLET, FILM COATED ORAL DAILY
Qty: 10 TABLET | Refills: 0 | Status: SHIPPED | OUTPATIENT
Start: 2021-10-05 | End: 2021-10-15

## 2021-10-07 LAB
BACTERIA UR CULT: NORMAL
BACTERIA UR CULT: NORMAL

## 2021-11-10 ENCOUNTER — OFFICE VISIT (OUTPATIENT)
Dept: FAMILY MEDICINE | Facility: CLINIC | Age: 86
End: 2021-11-10
Attending: FAMILY MEDICINE
Payer: MEDICARE

## 2021-11-10 VITALS
SYSTOLIC BLOOD PRESSURE: 126 MMHG | DIASTOLIC BLOOD PRESSURE: 82 MMHG | HEART RATE: 76 BPM | BODY MASS INDEX: 23.98 KG/M2 | HEIGHT: 64 IN | TEMPERATURE: 98 F | OXYGEN SATURATION: 95 % | RESPIRATION RATE: 18 BRPM | WEIGHT: 140.44 LBS

## 2021-11-10 DIAGNOSIS — E78.49 OTHER HYPERLIPIDEMIA: ICD-10-CM

## 2021-11-10 DIAGNOSIS — N30.01 ACUTE CYSTITIS WITH HEMATURIA: ICD-10-CM

## 2021-11-10 DIAGNOSIS — E21.0 PRIMARY HYPERPARATHYROIDISM: ICD-10-CM

## 2021-11-10 DIAGNOSIS — R39.9 UTI SYMPTOMS: Primary | ICD-10-CM

## 2021-11-10 DIAGNOSIS — E55.9 HYPOVITAMINOSIS D: ICD-10-CM

## 2021-11-10 DIAGNOSIS — E03.9 ACQUIRED HYPOTHYROIDISM: ICD-10-CM

## 2021-11-10 DIAGNOSIS — I10 ESSENTIAL HYPERTENSION: ICD-10-CM

## 2021-11-10 DIAGNOSIS — M81.8 OTHER OSTEOPOROSIS WITHOUT CURRENT PATHOLOGICAL FRACTURE: ICD-10-CM

## 2021-11-10 DIAGNOSIS — I48.20 CHRONIC ATRIAL FIBRILLATION: ICD-10-CM

## 2021-11-10 LAB
BILIRUB SERPL-MCNC: NEGATIVE MG/DL
BLOOD URINE, POC: 250
CLARITY, POC UA: ABNORMAL
COLOR, POC UA: YELLOW
GLUCOSE UR QL STRIP: NORMAL
KETONES UR QL STRIP: NEGATIVE
LEUKOCYTE ESTERASE URINE, POC: ABNORMAL
NITRITE, POC UA: NEGATIVE
PH, POC UA: 5
PROTEIN, POC: ABNORMAL
SPECIFIC GRAVITY, POC UA: 1.01
UROBILINOGEN, POC UA: NORMAL

## 2021-11-10 PROCEDURE — 90694 VACC AIIV4 NO PRSRV 0.5ML IM: CPT | Mod: PBBFAC,PN

## 2021-11-10 PROCEDURE — 87088 URINE BACTERIA CULTURE: CPT | Performed by: FAMILY MEDICINE

## 2021-11-10 PROCEDURE — 87086 URINE CULTURE/COLONY COUNT: CPT | Performed by: FAMILY MEDICINE

## 2021-11-10 PROCEDURE — 87186 SC STD MICRODIL/AGAR DIL: CPT | Performed by: FAMILY MEDICINE

## 2021-11-10 PROCEDURE — 99214 OFFICE O/P EST MOD 30 MIN: CPT | Mod: S$PBB,,, | Performed by: FAMILY MEDICINE

## 2021-11-10 PROCEDURE — 99999 PR PBB SHADOW E&M-EST. PATIENT-LVL V: ICD-10-PCS | Mod: PBBFAC,,, | Performed by: FAMILY MEDICINE

## 2021-11-10 PROCEDURE — 87077 CULTURE AEROBIC IDENTIFY: CPT | Performed by: FAMILY MEDICINE

## 2021-11-10 PROCEDURE — 99215 OFFICE O/P EST HI 40 MIN: CPT | Mod: PBBFAC,PN | Performed by: FAMILY MEDICINE

## 2021-11-10 PROCEDURE — 99214 PR OFFICE/OUTPT VISIT, EST, LEVL IV, 30-39 MIN: ICD-10-PCS | Mod: S$PBB,,, | Performed by: FAMILY MEDICINE

## 2021-11-10 PROCEDURE — 99999 PR PBB SHADOW E&M-EST. PATIENT-LVL V: CPT | Mod: PBBFAC,,, | Performed by: FAMILY MEDICINE

## 2021-11-10 PROCEDURE — 81002 URINALYSIS NONAUTO W/O SCOPE: CPT | Mod: PBBFAC,PN | Performed by: FAMILY MEDICINE

## 2021-11-10 PROCEDURE — G0008 ADMIN INFLUENZA VIRUS VAC: HCPCS | Mod: PBBFAC,PN

## 2021-11-10 RX ORDER — AMOXICILLIN AND CLAVULANATE POTASSIUM 500; 125 MG/1; MG/1
1 TABLET, FILM COATED ORAL 2 TIMES DAILY
Qty: 14 TABLET | Refills: 0 | Status: SHIPPED | OUTPATIENT
Start: 2021-11-10 | End: 2021-11-17

## 2021-11-13 LAB — BACTERIA UR CULT: ABNORMAL

## 2021-12-21 DIAGNOSIS — I10 ESSENTIAL HYPERTENSION: ICD-10-CM

## 2021-12-27 RX ORDER — LISINOPRIL 40 MG/1
40 TABLET ORAL DAILY
Qty: 90 TABLET | Refills: 1 | Status: SHIPPED | OUTPATIENT
Start: 2021-12-27 | End: 2022-04-04

## 2022-03-10 ENCOUNTER — LAB VISIT (OUTPATIENT)
Dept: LAB | Facility: HOSPITAL | Age: 87
End: 2022-03-10
Attending: FAMILY MEDICINE
Payer: MEDICARE

## 2022-03-10 DIAGNOSIS — E78.49 OTHER HYPERLIPIDEMIA: ICD-10-CM

## 2022-03-10 DIAGNOSIS — I10 ESSENTIAL HYPERTENSION: ICD-10-CM

## 2022-03-10 DIAGNOSIS — E03.9 ACQUIRED HYPOTHYROIDISM: ICD-10-CM

## 2022-03-10 LAB
ALBUMIN SERPL BCP-MCNC: 3.8 G/DL (ref 3.5–5.2)
ALP SERPL-CCNC: 70 U/L (ref 55–135)
ALT SERPL W/O P-5'-P-CCNC: 15 U/L (ref 10–44)
ANION GAP SERPL CALC-SCNC: 7 MMOL/L (ref 8–16)
AST SERPL-CCNC: 24 U/L (ref 10–40)
BASOPHILS # BLD AUTO: 0.06 K/UL (ref 0–0.2)
BASOPHILS NFR BLD: 2.1 % (ref 0–1.9)
BILIRUB SERPL-MCNC: 1.1 MG/DL (ref 0.1–1)
BUN SERPL-MCNC: 8 MG/DL (ref 10–30)
CALCIUM SERPL-MCNC: 10.2 MG/DL (ref 8.7–10.5)
CHLORIDE SERPL-SCNC: 101 MMOL/L (ref 95–110)
CHOLEST SERPL-MCNC: 126 MG/DL (ref 120–199)
CHOLEST/HDLC SERPL: 2.1 {RATIO} (ref 2–5)
CO2 SERPL-SCNC: 28 MMOL/L (ref 23–29)
CREAT SERPL-MCNC: 0.8 MG/DL (ref 0.5–1.4)
DIFFERENTIAL METHOD: ABNORMAL
EOSINOPHIL # BLD AUTO: 0.1 K/UL (ref 0–0.5)
EOSINOPHIL NFR BLD: 4.5 % (ref 0–8)
ERYTHROCYTE [DISTWIDTH] IN BLOOD BY AUTOMATED COUNT: 14 % (ref 11.5–14.5)
EST. GFR  (AFRICAN AMERICAN): >60 ML/MIN/1.73 M^2
EST. GFR  (NON AFRICAN AMERICAN): >60 ML/MIN/1.73 M^2
GLUCOSE SERPL-MCNC: 90 MG/DL (ref 70–110)
HCT VFR BLD AUTO: 33.2 % (ref 37–48.5)
HDLC SERPL-MCNC: 60 MG/DL (ref 40–75)
HDLC SERPL: 47.6 % (ref 20–50)
HGB BLD-MCNC: 10.7 G/DL (ref 12–16)
IMM GRANULOCYTES # BLD AUTO: 0.02 K/UL (ref 0–0.04)
IMM GRANULOCYTES NFR BLD AUTO: 0.7 % (ref 0–0.5)
LDLC SERPL CALC-MCNC: 55 MG/DL (ref 63–159)
LYMPHOCYTES # BLD AUTO: 1 K/UL (ref 1–4.8)
LYMPHOCYTES NFR BLD: 35.5 % (ref 18–48)
MCH RBC QN AUTO: 35.1 PG (ref 27–31)
MCHC RBC AUTO-ENTMCNC: 32.2 G/DL (ref 32–36)
MCV RBC AUTO: 109 FL (ref 82–98)
MONOCYTES # BLD AUTO: 0.3 K/UL (ref 0.3–1)
MONOCYTES NFR BLD: 11.1 % (ref 4–15)
NEUTROPHILS # BLD AUTO: 1.3 K/UL (ref 1.8–7.7)
NEUTROPHILS NFR BLD: 46.1 % (ref 38–73)
NONHDLC SERPL-MCNC: 66 MG/DL
NRBC BLD-RTO: 0 /100 WBC
PLATELET # BLD AUTO: 120 K/UL (ref 150–450)
PMV BLD AUTO: 15 FL (ref 9.2–12.9)
POTASSIUM SERPL-SCNC: 4 MMOL/L (ref 3.5–5.1)
PROT SERPL-MCNC: 6.4 G/DL (ref 6–8.4)
RBC # BLD AUTO: 3.05 M/UL (ref 4–5.4)
SODIUM SERPL-SCNC: 136 MMOL/L (ref 136–145)
TRIGL SERPL-MCNC: 55 MG/DL (ref 30–150)
TSH SERPL DL<=0.005 MIU/L-ACNC: 2.42 UIU/ML (ref 0.4–4)
WBC # BLD AUTO: 2.87 K/UL (ref 3.9–12.7)

## 2022-03-10 PROCEDURE — 80053 COMPREHEN METABOLIC PANEL: CPT | Performed by: FAMILY MEDICINE

## 2022-03-10 PROCEDURE — 85025 COMPLETE CBC W/AUTO DIFF WBC: CPT | Performed by: FAMILY MEDICINE

## 2022-03-10 PROCEDURE — 36415 COLL VENOUS BLD VENIPUNCTURE: CPT | Mod: PO | Performed by: FAMILY MEDICINE

## 2022-03-10 PROCEDURE — 80061 LIPID PANEL: CPT | Performed by: FAMILY MEDICINE

## 2022-03-10 PROCEDURE — 84443 ASSAY THYROID STIM HORMONE: CPT | Performed by: FAMILY MEDICINE

## 2022-03-11 DIAGNOSIS — E78.2 MIXED HYPERLIPIDEMIA: ICD-10-CM

## 2022-03-11 NOTE — TELEPHONE ENCOUNTER
No new care gaps identified.  Powered by [employer]. Reference number: [id number].   3/11/2022 9:16:36 AM CST

## 2022-03-15 RX ORDER — SIMVASTATIN 20 MG/1
20 TABLET, FILM COATED ORAL NIGHTLY
Qty: 90 TABLET | Refills: 2 | Status: SHIPPED | OUTPATIENT
Start: 2022-03-15 | End: 2022-10-10

## 2022-03-15 NOTE — TELEPHONE ENCOUNTER
Refill Authorization Note   Ama Lagn  is requesting a refill authorization.  Brief Assessment and Rationale for Refill:  Approve     Medication Therapy Plan:       Medication Reconciliation Completed: No   Comments:   --->Care Gap information included below if applicable.   Orders Placed This Encounter    simvastatin (ZOCOR) 20 MG tablet      Requested Prescriptions   Signed Prescriptions Disp Refills    simvastatin (ZOCOR) 20 MG tablet 90 tablet 2     Sig: Take 1 tablet (20 mg total) by mouth every evening.       Cardiovascular:  Antilipid - Statins Passed - 3/11/2022  9:15 AM        Passed - Patient is at least 18 years old        Passed - Valid encounter within last 15 months     Recent Visits  Date Type Provider Dept   11/10/21 Office Visit Dale Fulton MD [facility]   06/24/21 Office Visit Dale Fulton MD [facility]   05/10/21 Office Visit Dale Fulton MD [facility] Medicine   08/06/20 Office Visit Dale Fulton MD [facility]   06/02/20 Office Visit Dale Fulton MD [facility]   Showing recent visits within past 720 days and meeting all other requirements  Future Appointments  No visits were found meeting these conditions.  Showing future appointments within next 150 days and meeting all other requirements                Passed - ALT is 131 or below and within 360 days     ALT   Date Value Ref Range Status   03/10/2022 15 10 - 44 U/L Final   03/26/2021 13 10 - 44 U/L Final   05/17/2019 16 10 - 44 U/L Final              Passed - AST is 119 or below and within 360 days     AST   Date Value Ref Range Status   03/10/2022 24 10 - 40 U/L Final   03/26/2021 25 10 - 40 U/L Final   05/17/2019 29 10 - 40 U/L Final              Passed - Total Cholesterol within 360 days     Lab Results   Component Value Date    CHOL 126 03/10/2022    CHOL 142 03/26/2021    CHOL 141 06/10/2020              Passed - LDL within 360 days     LDL Cholesterol   Date Value  Ref Range Status   03/10/2022 55.0 (L) 63.0 - 159.0 mg/dL Final     Comment:     The National Cholesterol Education Program (NCEP) has set the  following guidelines (reference values) for LDL Cholesterol:  Optimal.......................<130 mg/dL  Borderline High...............130-159 mg/dL  High..........................160-189 mg/dL  Very High.....................>190 mg/dL              Passed - HDL within 360 days     HDL   Date Value Ref Range Status   03/10/2022 60 40 - 75 mg/dL Final     Comment:     The National Cholesterol Education Program (NCEP) has set the  following guidelines (reference values) for HDL Cholesterol:  Low...............<40 mg/dL  Optimal...........>60 mg/dL              Passed - Triglycerides within 360 days     Lab Results   Component Value Date    TRIG 55 03/10/2022    TRIG 52 03/26/2021    TRIG 54 06/10/2020                  Appointments  past 12m or future 3m with PCP    Date Provider   Last Visit   11/10/2021 Dale Fulton MD   Next Visit   Visit date not found Dale Fulton MD   ED visits in past 90 days: 0     Note composed:6:22 PM 03/15/2022

## 2022-03-16 ENCOUNTER — TELEPHONE (OUTPATIENT)
Dept: FAMILY MEDICINE | Facility: CLINIC | Age: 87
End: 2022-03-16
Payer: MEDICARE

## 2022-03-16 NOTE — TELEPHONE ENCOUNTER
----- Message from Jeanne Boeckelman, MA sent at 3/16/2022  9:52 AM CDT -----  I called the patient and went over her lab  results with her, patient expressed verbal understanding and declined the offer for hematology patient states at her age she isn't looking to add any other doctors on.

## 2022-05-13 DIAGNOSIS — I10 ESSENTIAL HYPERTENSION: ICD-10-CM

## 2022-05-13 RX ORDER — ATENOLOL 50 MG/1
TABLET ORAL
Qty: 180 TABLET | Refills: 1 | Status: SHIPPED | OUTPATIENT
Start: 2022-05-13 | End: 2022-10-10

## 2022-05-13 NOTE — TELEPHONE ENCOUNTER
No new care gaps identified.  [facility] Embedded Care Gaps. Reference number: [id number]. 5/13/2022   10:05:35 AM CESART

## 2022-05-13 NOTE — TELEPHONE ENCOUNTER
Refill Authorization Note   Ama Lang  is requesting a refill authorization.  Brief Assessment and Rationale for Refill:  Approve     Medication Therapy Plan:       Medication Reconciliation Completed: No   Comments:     No Care Gaps recommended.     Note composed:11:24 AM 05/13/2022

## 2022-06-01 DIAGNOSIS — E03.9 ACQUIRED HYPOTHYROIDISM: ICD-10-CM

## 2022-06-01 RX ORDER — LEVOTHYROXINE SODIUM 112 UG/1
56 TABLET ORAL DAILY
Qty: 45 TABLET | Refills: 1 | Status: SHIPPED | OUTPATIENT
Start: 2022-06-01 | End: 2022-10-10

## 2022-06-01 NOTE — TELEPHONE ENCOUNTER
Refill Authorization Note   Ama Lang  is requesting a refill authorization.  Brief Assessment and Rationale for Refill:  Approve     Medication Therapy Plan:       Medication Reconciliation Completed: No   Comments:     No Care Gaps recommended.     Note composed:1:44 PM 06/01/2022

## 2022-06-01 NOTE — TELEPHONE ENCOUNTER
No new care gaps identified.  [facility] Embedded Care Gaps. Reference number: [id number]. 6/01/2022   9:22:12 AM CDT

## 2022-06-02 NOTE — TELEPHONE ENCOUNTER
----- Message from Khushbu Robbins MD sent at 12/21/2020 10:26 AM CST -----  Macrobid and doxy sent to pharmacy   Clear to auscultation bilaterally with no adventitious sounds

## 2022-06-06 ENCOUNTER — TELEPHONE (OUTPATIENT)
Dept: FAMILY MEDICINE | Facility: CLINIC | Age: 87
End: 2022-06-06
Payer: MEDICARE

## 2022-06-06 NOTE — TELEPHONE ENCOUNTER
----- Message from Maycol Hunter sent at 6/6/2022  9:03 AM CDT -----  Type:  Same Day Appointment Request    Caller is requesting a same day appointment.  Caller declined first available appointment listed below.      Name of Caller: Patient  When is the first available appointment? 08/30/22  Symptoms:  UTI, sinus infection  Best Call Back Number: [phone]  Additional Information:           
Patients daughter notified no appt available-recommended urgent care facility  
same as patient

## 2022-08-17 RX ORDER — OMEPRAZOLE 20 MG/1
CAPSULE, DELAYED RELEASE ORAL
Qty: 90 CAPSULE | Refills: 3 | OUTPATIENT
Start: 2022-08-17

## 2022-08-17 NOTE — TELEPHONE ENCOUNTER
I did not give her this.  She has osteoporosis with high risk of fracture and this makes it worse.  Refill denied

## 2022-08-25 ENCOUNTER — TELEPHONE (OUTPATIENT)
Dept: FAMILY MEDICINE | Facility: CLINIC | Age: 87
End: 2022-08-25
Payer: MEDICARE

## 2022-08-25 NOTE — TELEPHONE ENCOUNTER
Appointment scheduled for the date of 8-26-22. Patient agreed to appointment date, time, and location. Location confirmed at the time of call.

## 2022-08-25 NOTE — TELEPHONE ENCOUNTER
----- Message from Kailyn Morales sent at 8/25/2022 11:29 AM CDT -----  Contact: pt  Type:  Same Day Appointment Request    Caller is requesting a same day appointment.  Caller declined first available appointment listed below.      Name of Caller:  pt   When is the first available appointment? none  Symptoms:  uti, headache, restless   Best Call Back Number: [phone]     Additional Information:    pt is calling the office to get a same day appt for symptoms.. Pt adv she would like to come in tomorrow if possible... Please call and adv--

## 2022-08-26 ENCOUNTER — HOSPITAL ENCOUNTER (EMERGENCY)
Facility: HOSPITAL | Age: 87
Discharge: HOME | End: 2022-08-26
Attending: EMERGENCY MEDICINE
Payer: MEDICARE

## 2022-08-26 VITALS
WEIGHT: 134 LBS | BODY MASS INDEX: 22.88 KG/M2 | HEART RATE: 66 BPM | SYSTOLIC BLOOD PRESSURE: 144 MMHG | TEMPERATURE: 98 F | OXYGEN SATURATION: 96 % | RESPIRATION RATE: 20 BRPM | DIASTOLIC BLOOD PRESSURE: 76 MMHG | HEIGHT: 64 IN

## 2022-08-26 DIAGNOSIS — R93.5 ABNORMAL ABDOMINAL CT SCAN: Primary | ICD-10-CM

## 2022-08-26 DIAGNOSIS — R06.02 SHORTNESS OF BREATH: ICD-10-CM

## 2022-08-26 LAB
ACANTHOCYTES BLD QL SMEAR: PRESENT
ALBUMIN SERPL BCP-MCNC: 4.2 G/DL (ref 3.5–5.2)
ALP SERPL-CCNC: 90 U/L (ref 55–135)
ALT SERPL W/O P-5'-P-CCNC: 19 U/L (ref 10–44)
ANION GAP SERPL CALC-SCNC: 8 MMOL/L (ref 8–16)
ANISOCYTOSIS BLD QL SMEAR: SLIGHT
AST SERPL-CCNC: 31 U/L (ref 10–40)
BACTERIA #/AREA URNS HPF: NEGATIVE /HPF
BASOPHILS # BLD AUTO: 0.05 K/UL (ref 0–0.2)
BASOPHILS NFR BLD: 1.6 % (ref 0–1.9)
BILIRUB SERPL-MCNC: 0.8 MG/DL (ref 0.1–1)
BILIRUB UR QL STRIP: ABNORMAL
BNP SERPL-MCNC: 138 PG/ML (ref 0–99)
BUN SERPL-MCNC: 10 MG/DL (ref 10–30)
BURR CELLS BLD QL SMEAR: ABNORMAL
CALCIUM SERPL-MCNC: 11 MG/DL (ref 8.7–10.5)
CHLORIDE SERPL-SCNC: 94 MMOL/L (ref 95–110)
CLARITY UR: CLEAR
CO2 SERPL-SCNC: 26 MMOL/L (ref 23–29)
COLOR UR: ABNORMAL
CREAT SERPL-MCNC: 1 MG/DL (ref 0.5–1.4)
DIFFERENTIAL METHOD: ABNORMAL
EOSINOPHIL # BLD AUTO: 0.1 K/UL (ref 0–0.5)
EOSINOPHIL NFR BLD: 1.9 % (ref 0–8)
ERYTHROCYTE [DISTWIDTH] IN BLOOD BY AUTOMATED COUNT: 13.1 % (ref 11.5–14.5)
EST. GFR  (NO RACE VARIABLE): 52.2 ML/MIN/1.73 M^2
GLUCOSE SERPL-MCNC: 117 MG/DL (ref 70–110)
GLUCOSE UR QL STRIP: ABNORMAL
HCT VFR BLD AUTO: 34.2 % (ref 37–48.5)
HGB BLD-MCNC: 11.8 G/DL (ref 12–16)
HGB UR QL STRIP: ABNORMAL
HYALINE CASTS #/AREA URNS LPF: 15 /LPF
IMM GRANULOCYTES # BLD AUTO: 0.04 K/UL (ref 0–0.04)
IMM GRANULOCYTES NFR BLD AUTO: 1.3 % (ref 0–0.5)
KETONES UR QL STRIP: ABNORMAL
LEUKOCYTE ESTERASE UR QL STRIP: ABNORMAL
LYMPHOCYTES # BLD AUTO: 1.1 K/UL (ref 1–4.8)
LYMPHOCYTES NFR BLD: 36.4 % (ref 18–48)
MCH RBC QN AUTO: 36.1 PG (ref 27–31)
MCHC RBC AUTO-ENTMCNC: 34.5 G/DL (ref 32–36)
MCV RBC AUTO: 105 FL (ref 82–98)
MICROSCOPIC COMMENT: ABNORMAL
MONOCYTES # BLD AUTO: 0.4 K/UL (ref 0.3–1)
MONOCYTES NFR BLD: 14.1 % (ref 4–15)
NEUTROPHILS # BLD AUTO: 1.4 K/UL (ref 1.8–7.7)
NEUTROPHILS NFR BLD: 44.7 % (ref 38–73)
NITRITE UR QL STRIP: ABNORMAL
NRBC BLD-RTO: 0 /100 WBC
PH UR STRIP: 6 [PH] (ref 5–8)
PLATELET # BLD AUTO: 148 K/UL (ref 150–450)
PLATELET BLD QL SMEAR: ABNORMAL
PMV BLD AUTO: 13.6 FL (ref 9.2–12.9)
POIKILOCYTOSIS BLD QL SMEAR: SLIGHT
POTASSIUM SERPL-SCNC: 4.9 MMOL/L (ref 3.5–5.1)
PROT SERPL-MCNC: 6.9 G/DL (ref 6–8.4)
PROT UR QL STRIP: ABNORMAL
RBC # BLD AUTO: 3.27 M/UL (ref 4–5.4)
RBC #/AREA URNS HPF: 3 /HPF (ref 0–4)
SARS-COV-2 RDRP RESP QL NAA+PROBE: NEGATIVE
SCHISTOCYTES BLD QL SMEAR: PRESENT
SODIUM SERPL-SCNC: 128 MMOL/L (ref 136–145)
SP GR UR STRIP: 1.02 (ref 1–1.03)
SQUAMOUS #/AREA URNS HPF: 4 /HPF
TROPONIN I SERPL DL<=0.01 NG/ML-MCNC: <0.03 NG/ML
URN SPEC COLLECT METH UR: ABNORMAL
UROBILINOGEN UR STRIP-ACNC: ABNORMAL EU/DL
WBC # BLD AUTO: 3.17 K/UL (ref 3.9–12.7)
WBC #/AREA URNS HPF: 1 /HPF (ref 0–5)

## 2022-08-26 PROCEDURE — 81001 URINALYSIS AUTO W/SCOPE: CPT | Performed by: EMERGENCY MEDICINE

## 2022-08-26 PROCEDURE — 93005 ELECTROCARDIOGRAM TRACING: CPT | Performed by: SPECIALIST

## 2022-08-26 PROCEDURE — 25000003 PHARM REV CODE 250: Performed by: EMERGENCY MEDICINE

## 2022-08-26 PROCEDURE — 84484 ASSAY OF TROPONIN QUANT: CPT | Performed by: EMERGENCY MEDICINE

## 2022-08-26 PROCEDURE — 99285 EMERGENCY DEPT VISIT HI MDM: CPT | Mod: 25

## 2022-08-26 PROCEDURE — 80053 COMPREHEN METABOLIC PANEL: CPT | Performed by: EMERGENCY MEDICINE

## 2022-08-26 PROCEDURE — 96365 THER/PROPH/DIAG IV INF INIT: CPT

## 2022-08-26 PROCEDURE — 93010 EKG 12-LEAD: ICD-10-PCS | Mod: ,,, | Performed by: SPECIALIST

## 2022-08-26 PROCEDURE — 83880 ASSAY OF NATRIURETIC PEPTIDE: CPT | Performed by: EMERGENCY MEDICINE

## 2022-08-26 PROCEDURE — 63600175 PHARM REV CODE 636 W HCPCS: Performed by: EMERGENCY MEDICINE

## 2022-08-26 PROCEDURE — 93010 ELECTROCARDIOGRAM REPORT: CPT | Mod: ,,, | Performed by: SPECIALIST

## 2022-08-26 PROCEDURE — 85025 COMPLETE CBC W/AUTO DIFF WBC: CPT | Performed by: EMERGENCY MEDICINE

## 2022-08-26 PROCEDURE — 96375 TX/PRO/DX INJ NEW DRUG ADDON: CPT

## 2022-08-26 PROCEDURE — U0002 COVID-19 LAB TEST NON-CDC: HCPCS | Performed by: EMERGENCY MEDICINE

## 2022-08-26 RX ORDER — ACETAMINOPHEN 500 MG
1000 TABLET ORAL
Status: COMPLETED | OUTPATIENT
Start: 2022-08-26 | End: 2022-08-26

## 2022-08-26 RX ORDER — CEPHALEXIN 500 MG/1
500 CAPSULE ORAL EVERY 12 HOURS
Qty: 10 CAPSULE | Refills: 0 | Status: SHIPPED | OUTPATIENT
Start: 2022-08-26 | End: 2022-08-31

## 2022-08-26 RX ORDER — NITROFURANTOIN 25; 75 MG/1; MG/1
100 CAPSULE ORAL 2 TIMES DAILY
COMMUNITY
Start: 2022-08-09 | End: 2022-08-26 | Stop reason: ALTCHOICE

## 2022-08-26 RX ORDER — SULFAMETHOXAZOLE AND TRIMETHOPRIM 800; 160 MG/1; MG/1
1 TABLET ORAL 2 TIMES DAILY
COMMUNITY
Start: 2022-08-13 | End: 2022-08-26 | Stop reason: ALTCHOICE

## 2022-08-26 RX ORDER — AMOXICILLIN AND CLAVULANATE POTASSIUM 875; 125 MG/1; MG/1
1 TABLET, FILM COATED ORAL 2 TIMES DAILY
COMMUNITY
Start: 2022-08-12 | End: 2022-08-26 | Stop reason: ALTCHOICE

## 2022-08-26 RX ORDER — AZITHROMYCIN 250 MG/1
TABLET, FILM COATED ORAL
Status: ON HOLD | COMMUNITY
Start: 2022-06-06 | End: 2022-08-30 | Stop reason: HOSPADM

## 2022-08-26 RX ORDER — ONDANSETRON 2 MG/ML
INJECTION INTRAMUSCULAR; INTRAVENOUS
Status: DISCONTINUED
Start: 2022-08-26 | End: 2022-08-26 | Stop reason: HOSPADM

## 2022-08-26 RX ORDER — ONDANSETRON 2 MG/ML
4 INJECTION INTRAMUSCULAR; INTRAVENOUS
Status: COMPLETED | OUTPATIENT
Start: 2022-08-26 | End: 2022-08-26

## 2022-08-26 RX ORDER — MOMETASONE FUROATE 1 MG/ML
SOLUTION TOPICAL
COMMUNITY
Start: 2022-08-16 | End: 2022-09-06

## 2022-08-26 RX ADMIN — CEFTRIAXONE 1 G: 1 INJECTION, SOLUTION INTRAVENOUS at 06:08

## 2022-08-26 RX ADMIN — ONDANSETRON 4 MG: 2 INJECTION INTRAMUSCULAR; INTRAVENOUS at 03:08

## 2022-08-26 RX ADMIN — ACETAMINOPHEN 1000 MG: 500 TABLET ORAL at 02:08

## 2022-08-27 NOTE — DISCHARGE INSTRUCTIONS
You have a cystic structure in your left adnexa. We have an outpatient ultrasound ordered. Please contact the radiology department for scheduling.

## 2022-08-27 NOTE — ED PROVIDER NOTES
Encounter Date: 8/26/2022       History     Chief Complaint   Patient presents with    Shortness of Breath     Sob, fatigue, UTI symptoms       HPI     Seen and evaluated with complaint of nausea, fatigue, concern for urinary tract infection, and intermittent shortness of breath, noted with eating to taper moderate inspirations.  This been ongoing for a few days.  Patient she denies any alleviating factors.  Reports no additional complaints.  Symptoms moderate.  She notes urinary frequency.  She denies active dysuria.  No chest pain.  Intermittent shortness of breath headache improved with deep inspiration.      Review of patient's allergies indicates:   Allergen Reactions    Montelukast Other (See Comments)     Depleted sodium     Ciprofloxacin     Hydrochlorothiazide Other (See Comments)     Low sodium     Past Medical History:   Diagnosis Date    *Atrial fibrillation     Dr. Mullen    Cancer     skin cancer arms     Cataract     Diverticulitis     Hyperlipidemia     Hypertension     Hypothyroidism      Past Surgical History:   Procedure Laterality Date    COLONOSCOPY  12/18/2003    Dr. Santoyo    CYSTOSCOPY  2008    Dr. Nguyen    CYSTOSCOPY N/A 1/20/2021    Procedure: CYSTOSCOPY;  Surgeon: Khushbu Robbins MD;  Location: [facility];  Service: Urology;  Laterality: N/A;    DILATION AND CURETTAGE OF UTERUS      EYE SURGERY      skin cancer bilateral arms  08/2019    Dr Soler skin cancer both arms     TUBAL LIGATION       Family History   Problem Relation Age of Onset    Cancer Mother         ovarian    Heart disease Father     Gentry's disease Father     Macular degeneration Father     Heart disease Brother     Cancer Sister         blood    Cancer Brother     Gentry's disease Daughter     Early death Daughter     Arthritis Sister         spine    Carpal tunnel syndrome Son     Hypertension Son     Fibromyalgia Daughter     Román's disease Daughter     Arthritis Daughter          x2    Kidney cancer Neg Hx     Prostate cancer Neg Hx     Urolithiasis Neg Hx      Social History     Tobacco Use    Smoking status: Never Smoker    Smokeless tobacco: Never Used   Substance Use Topics    Alcohol use: No    Drug use: No     Review of Systems   Constitutional: Negative for fever.   HENT: Negative for sore throat.    Respiratory: Positive for shortness of breath.    Cardiovascular: Negative for chest pain.   Gastrointestinal: Positive for abdominal pain. Negative for nausea.   Genitourinary: Positive for frequency. Negative for dysuria.   Musculoskeletal: Negative for back pain.   Skin: Negative for rash.   Neurological: Negative for weakness.   Hematological: Bruises/bleeds easily.   All other systems reviewed and are negative.      Physical Exam     Initial Vitals [08/26/22 1257]   BP Pulse Resp Temp SpO2   (!) 175/85 72 16 97.6 °F (36.4 °C) 97 %      MAP       --         Physical Exam    Nursing note reviewed.  Constitutional: She appears well-developed and well-nourished.   HENT:   Head: Normocephalic and atraumatic.   Eyes: Conjunctivae are normal.   Cardiovascular: Normal rate and regular rhythm.   Pulmonary/Chest: Breath sounds normal.   Abdominal: Abdomen is soft.   Musculoskeletal:         General: Normal range of motion.     Neurological: She is alert and oriented to person, place, and time.   Skin: Skin is warm and dry.   Bruising to the face   Psychiatric: She has a normal mood and affect. Her speech is normal.         ED Course   Procedures  Labs Reviewed   CBC W/ AUTO DIFFERENTIAL - Abnormal; Notable for the following components:       Result Value    WBC 3.17 (*)     RBC 3.27 (*)     Hemoglobin 11.8 (*)     Hematocrit 34.2 (*)      (*)     MCH 36.1 (*)     Platelets 148 (*)     MPV 13.6 (*)     Immature Granulocytes 1.3 (*)     Gran # (ANC) 1.4 (*)     All other components within normal limits   COMPREHENSIVE METABOLIC PANEL - Abnormal; Notable for the following  components:    Sodium 128 (*)     Chloride 94 (*)     Glucose 117 (*)     Calcium 11.0 (*)     eGFR 52.2 (*)     All other components within normal limits   B-TYPE NATRIURETIC PEPTIDE - Abnormal; Notable for the following components:     (*)     All other components within normal limits   URINALYSIS - Abnormal; Notable for the following components:    Color, UA Orange (*)     All other components within normal limits   URINALYSIS MICROSCOPIC - Abnormal; Notable for the following components:    Hyaline Casts, UA 15 (*)     All other components within normal limits   CULTURE, URINE   TROPONIN I   SARS-COV-2 RNA AMPLIFICATION, QUAL        ECG Results          EKG 12-lead (In process)  Result time 08/26/22 14:04:30    In process by Interface, Lab In [facility] (08/26/22 14:04:30)                 Narrative:    Test Reason : R06.02,    Vent. Rate : 066 BPM     Atrial Rate : 375 BPM     P-R Int : 000 ms          QRS Dur : 104 ms      QT Int : 394 ms       P-R-T Axes : 000 044 -16 degrees     QTc Int : 413 ms    Atrial fibrillation  Incomplete right bundle branch block  Nonspecific ST and T wave abnormality  Abnormal ECG  When compared with ECG of 13-NOV-2017 16:54,  Nonspecific T wave abnormality now evident in Anterior leads    Referred By: [email or web address]   SELF           Confirmed By:                             Imaging Results          CT Head Without Contrast (Final result)  Result time 08/26/22 19:43:36    Final result by Kameron Jaramillo MD (08/26/22 19:43:36)                 Narrative:    CMS MANDATED QUALITY DATA - CT RADIATION - 436    All CT scans at this facility utilize dose modulation, iterative reconstruction, and/or weight based dosing when appropriate to reduce radiation dose to as low as reasonably achievable.        Reason: closed head injury    TECHNIQUE: Head CT without IV contrast.    COMPARISON: None    FINDINGS:    Gray-white differentiation is maintained without hemorrhage, midline shift, or mass  effect. Periventricular and subcortical white matter low-attenuation bilaterally suggest chronic small vessel ischemic changes. Diffuse cerebral and cerebellar atrophy is evident.    The ventricles and cisterns are maintained.    Calvarium is intact. Visualized sinuses are clear.    IMPRESSION:    1. No acute intracranial abnormality.  2. Chronic and involutional changes of the brain.        Electronically signed by:  Kameron Jaramillo DO  8/26/2022 7:43 PM CDT Workstation: [id number]                             CT Abdomen Pelvis  Without Contrast (Final result)  Result time 08/26/22 16:51:09   Procedure changed from CT Abdomen Without Contrast     Final result by Clark Rosenberg MD (08/26/22 16:51:09)                 Narrative:    CMS MANDATED QUALITY DATA - CT RADIATION - 436    All CT scans at this facility utilize dose modulation, iterative reconstruction, and/or weight based dosing when appropriate to reduce radiation dose to as low as reasonably achievable.        Reason: Abdominal mass, intra-abdominal neoplasm suspected Shortness of Breath (Sob, fatigue, UTI symptoms    TECHNIQUE: CT abdomen and pelvis without IV or oral contrast. Study is tailored for detection of urinary tract calculi and evaluation of solid organs, hollow viscera, and vascular structures is limited.    COMPARISON: None    CT ABDOMEN:  Partially visualized heart is enlarged with coronary artery calcification. Minimal subpleural medial left basilar opacities suggest atelectasis or scarring. Visualized lung bases otherwise clear.    Liver parenchyma is diffusely hyperdense, with noncontrast liver otherwise unremarkable. Noncontrast gallbladder, pancreas, spleen, and adrenals are normal. Noncontrast right kidney is normal. Moderate diffuse left renal cortical atrophy is multifocal, with decreased size of left kidney.    Mild aortoiliac calcifications are present.    Colonic diverticula are diffuse. Large and small intestine show no additional  abnormality. Appendix not identified, but no secondary signs of appendicitis are present. No free intraperitoneal gas.    L3 superior endplate compression fracture has acute cortical fracture planes but no surrounding paraspinous hematoma. Degenerative disc disease occurs at L1-L2 and L2-L3. Additional lumbar spine degenerative changes are present.    CT PELVIS:  Right spigelian hernia contains fat and segment of small intestines without obstruction. Tiny fat-containing left inguinal hernia is evident.    Bladder is normal. No free pelvic fluid. Uterus appears unremarkable. Left adnexal cyst measures 4.2 x 4.7 cm. No acute osseous abnormality throughout the pelvis.    IMPRESSION:    1. 4.2 x 4.7 cm left adnexal cystic lesion of undetermined etiology. Due to size, further evaluation with pelvic ultrasound, which can be performed as an outpatient, is recommended.  2. Cardiomegaly and coronary artery calcification.  3. Hyperdense liver parenchyma can be seen as sequelae of past congestion from right heart failure, hemachromatosis, or chronic amiodarone therapy.  4. Left renal atrophy.  5. Diverticulosis.  6. L3 compression fracture appears acute. Correlation for symptoms at this site is requested.  7. Right spigelian hernia.    Electronically signed by:  Clark Rosenberg MD  8/26/2022 4:51 PM CDT Workstation: [id number]                             X-Ray Chest AP Portable (Final result)  Result time 08/26/22 14:40:25    Final result by Demetra Shahid MD (08/26/22 14:40:25)                 Narrative:    XR CHEST 1 VIEW    CLINICAL HISTORY:  89 years Female CHF    COMPARISON: 12/5/2017    FINDINGS: Cardiomediastinal silhouette is within normal limits. Lungs are normally expanded with no airspace consolidation. No pleural effusion or pneumothorax. No acute osseous abnormality.    IMPRESSION: No acute pulmonary process.    Electronically signed by:  Demetra Shahid MD  8/26/2022 2:40 PM CDT Workstation: [id number]                                Medications   acetaminophen tablet 1,000 mg (1,000 mg Oral Given 8/26/22 1448)   ondansetron injection 4 mg (4 mg Intravenous Given 8/26/22 1534)   cefTRIAXone (ROCEPHIN) 1 g/50 mL D5W IVPB (0 g Intravenous Stopped 8/26/22 1911)     Medical Decision Making:   Initial Assessment:   Seen and evaluated.  Presented with several concerns.  Laboratory evaluation stable.  There was noted mild hyponatremia with a sodium of 128.  She also had calcium of 11.  Remainder labs are stable.  She had a brain that treated peptide of 138 which is likely not contributory to today's findings.  Her shortness of breath improved while she was here.  She also noted that she had lower back pain.  There is an acute appearing fracture without surrounding hematoma and the lumbar spine.  She will have outpatient neuro surgical follow-up for this.  Additionally, she had an adnexal lesion.  Recommended gyn follow-up and have ordered an outpatient ultrasound.  Ultimately she was able to tolerate oral intake.  Her case was discussed with hospital medicine was saw the patient in consultation.  At this time, it does not appear that she would benefit from an overnight admission to the hospital, I have discussed with discharge with the patient, and she is in agreement with discharge at this time.  She will be discharged home to follow as an outpatient in fair condition.                        Clinical Impression:   Final diagnoses:  [R06.02] Shortness of breath  [R93.5] Abnormal abdominal CT scan (Primary)          ED Disposition Condition    Discharge Stable        ED Prescriptions     Medication Sig Dispense Start Date End Date Auth. Provider    cephALEXin (KEFLEX) 500 MG capsule Take 1 capsule (500 mg total) by mouth every 12 (twelve) hours. for 5 days 10 capsule 8/26/2022 8/31/2022 Alejandro Vickers Jr., MD        Follow-up Information     Follow up With Specialties Details Why Contact Info Additional Information    Dale  THEA Fulton MD Family Medicine   [address]  [place] [address]  [phone]       [facility] - Emergency Dept Emergency Medicine   [address] [address]  [phone] 1st floor    Deepak Montgomery MD Obstetrics and Gynecology   [address]  [place] [address]  [phone]              Alejandro Vickers Jr., MD  08/26/22 8663

## 2022-08-27 NOTE — ED NOTES
PT RESTING IN NAD IN BED WITH DAUGHTER AT BEDSIDE. AWAITING ADMIT ORDERS. VSS. NO COMPLAINTS AT PRESENT.  SANDWICH TRAY SERVED AT 1930 EATEN.

## 2022-08-29 ENCOUNTER — HOSPITAL ENCOUNTER (OUTPATIENT)
Facility: HOSPITAL | Age: 87
Discharge: HOME HEALTH | End: 2022-08-30
Attending: EMERGENCY MEDICINE | Admitting: INTERNAL MEDICINE
Payer: MEDICARE

## 2022-08-29 DIAGNOSIS — R25.2 MUSCLE CRAMPS: ICD-10-CM

## 2022-08-29 DIAGNOSIS — R53.83 FATIGUE, UNSPECIFIED TYPE: ICD-10-CM

## 2022-08-29 DIAGNOSIS — R07.9 CHEST PAIN: ICD-10-CM

## 2022-08-29 DIAGNOSIS — E87.8 HYPOCHLOREMIA: ICD-10-CM

## 2022-08-29 DIAGNOSIS — E87.1 HYPONATREMIA: Primary | ICD-10-CM

## 2022-08-29 LAB
ALBUMIN SERPL BCP-MCNC: 4.4 G/DL (ref 3.5–5.2)
ALP SERPL-CCNC: 86 U/L (ref 55–135)
ALT SERPL W/O P-5'-P-CCNC: 23 U/L (ref 10–44)
ANION GAP SERPL CALC-SCNC: 4 MMOL/L (ref 8–16)
ANION GAP SERPL CALC-SCNC: 6 MMOL/L (ref 8–16)
ANION GAP SERPL CALC-SCNC: 9 MMOL/L (ref 8–16)
AST SERPL-CCNC: 35 U/L (ref 10–40)
BACTERIA #/AREA URNS HPF: NEGATIVE /HPF
BASOPHILS # BLD AUTO: 0.06 K/UL (ref 0–0.2)
BASOPHILS NFR BLD: 1.3 % (ref 0–1.9)
BILIRUB SERPL-MCNC: 1.1 MG/DL (ref 0.1–1)
BILIRUB UR QL STRIP: ABNORMAL
BUN SERPL-MCNC: 11 MG/DL (ref 10–30)
BUN SERPL-MCNC: 12 MG/DL (ref 10–30)
BUN SERPL-MCNC: 12 MG/DL (ref 10–30)
CALCIUM SERPL-MCNC: 10.4 MG/DL (ref 8.7–10.5)
CALCIUM SERPL-MCNC: 10.4 MG/DL (ref 8.7–10.5)
CALCIUM SERPL-MCNC: 10.7 MG/DL (ref 8.7–10.5)
CHLORIDE SERPL-SCNC: 90 MMOL/L (ref 95–110)
CHLORIDE SERPL-SCNC: 90 MMOL/L (ref 95–110)
CHLORIDE SERPL-SCNC: 94 MMOL/L (ref 95–110)
CLARITY UR: CLEAR
CO2 SERPL-SCNC: 29 MMOL/L (ref 23–29)
CO2 SERPL-SCNC: 30 MMOL/L (ref 23–29)
CO2 SERPL-SCNC: 30 MMOL/L (ref 23–29)
COLOR UR: ABNORMAL
CREAT SERPL-MCNC: 0.6 MG/DL (ref 0.5–1.4)
CREAT SERPL-MCNC: 0.8 MG/DL (ref 0.5–1.4)
CREAT SERPL-MCNC: 0.8 MG/DL (ref 0.5–1.4)
DIFFERENTIAL METHOD: ABNORMAL
EOSINOPHIL # BLD AUTO: 0.1 K/UL (ref 0–0.5)
EOSINOPHIL NFR BLD: 1.8 % (ref 0–8)
ERYTHROCYTE [DISTWIDTH] IN BLOOD BY AUTOMATED COUNT: 13.2 % (ref 11.5–14.5)
EST. GFR  (NO RACE VARIABLE): >60 ML/MIN/1.73 M^2
GLUCOSE SERPL-MCNC: 103 MG/DL (ref 70–110)
GLUCOSE SERPL-MCNC: 110 MG/DL (ref 70–110)
GLUCOSE SERPL-MCNC: 113 MG/DL (ref 70–110)
GLUCOSE SERPL-MCNC: 136 MG/DL (ref 70–110)
GLUCOSE UR QL STRIP: ABNORMAL
HCT VFR BLD AUTO: 36.2 % (ref 37–48.5)
HGB BLD-MCNC: 12.3 G/DL (ref 12–16)
HGB UR QL STRIP: ABNORMAL
HYALINE CASTS #/AREA URNS LPF: 4 /LPF
IMM GRANULOCYTES # BLD AUTO: 0.05 K/UL (ref 0–0.04)
IMM GRANULOCYTES NFR BLD AUTO: 1.1 % (ref 0–0.5)
KETONES UR QL STRIP: ABNORMAL
LEUKOCYTE ESTERASE UR QL STRIP: ABNORMAL
LYMPHOCYTES # BLD AUTO: 1.2 K/UL (ref 1–4.8)
LYMPHOCYTES NFR BLD: 27 % (ref 18–48)
MAGNESIUM SERPL-MCNC: 2.1 MG/DL (ref 1.6–2.6)
MCH RBC QN AUTO: 35.4 PG (ref 27–31)
MCHC RBC AUTO-ENTMCNC: 34 G/DL (ref 32–36)
MCV RBC AUTO: 104 FL (ref 82–98)
MICROSCOPIC COMMENT: ABNORMAL
MONOCYTES # BLD AUTO: 0.5 K/UL (ref 0.3–1)
MONOCYTES NFR BLD: 10.3 % (ref 4–15)
NEUTROPHILS # BLD AUTO: 2.6 K/UL (ref 1.8–7.7)
NEUTROPHILS NFR BLD: 58.5 % (ref 38–73)
NITRITE UR QL STRIP: ABNORMAL
NRBC BLD-RTO: 0 /100 WBC
PH UR STRIP: 6 [PH] (ref 5–8)
PLATELET # BLD AUTO: 159 K/UL (ref 150–450)
PLATELET BLD QL SMEAR: ABNORMAL
PMV BLD AUTO: 13.9 FL (ref 9.2–12.9)
POTASSIUM SERPL-SCNC: 4.5 MMOL/L (ref 3.5–5.1)
POTASSIUM SERPL-SCNC: 4.6 MMOL/L (ref 3.5–5.1)
POTASSIUM SERPL-SCNC: 4.8 MMOL/L (ref 3.5–5.1)
PROT SERPL-MCNC: 7.3 G/DL (ref 6–8.4)
PROT UR QL STRIP: ABNORMAL
RBC # BLD AUTO: 3.47 M/UL (ref 4–5.4)
RBC #/AREA URNS HPF: 1 /HPF (ref 0–4)
SARS-COV-2 RDRP RESP QL NAA+PROBE: NEGATIVE
SODIUM SERPL-SCNC: 125 MMOL/L (ref 136–145)
SODIUM SERPL-SCNC: 125 MMOL/L (ref 136–145)
SODIUM SERPL-SCNC: 128 MMOL/L (ref 136–145)
SODIUM SERPL-SCNC: 129 MMOL/L (ref 136–145)
SP GR UR STRIP: 1.01 (ref 1–1.03)
SQUAMOUS #/AREA URNS HPF: 1 /HPF
URN SPEC COLLECT METH UR: ABNORMAL
UROBILINOGEN UR STRIP-ACNC: ABNORMAL EU/DL
WBC # BLD AUTO: 4.58 K/UL (ref 3.9–12.7)
WBC #/AREA URNS HPF: 1 /HPF (ref 0–5)

## 2022-08-29 PROCEDURE — 99900035 HC TECH TIME PER 15 MIN (STAT)

## 2022-08-29 PROCEDURE — G0378 HOSPITAL OBSERVATION PER HR: HCPCS | Mod: CS

## 2022-08-29 PROCEDURE — 99900031 HC PATIENT EDUCATION (STAT)

## 2022-08-29 PROCEDURE — 36415 COLL VENOUS BLD VENIPUNCTURE: CPT | Performed by: NURSE PRACTITIONER

## 2022-08-29 PROCEDURE — 83735 ASSAY OF MAGNESIUM: CPT | Performed by: EMERGENCY MEDICINE

## 2022-08-29 PROCEDURE — 94799 UNLISTED PULMONARY SVC/PX: CPT

## 2022-08-29 PROCEDURE — 80048 BASIC METABOLIC PNL TOTAL CA: CPT | Mod: 91 | Performed by: NURSE PRACTITIONER

## 2022-08-29 PROCEDURE — 25000003 PHARM REV CODE 250: Performed by: NURSE PRACTITIONER

## 2022-08-29 PROCEDURE — G0378 HOSPITAL OBSERVATION PER HR: HCPCS

## 2022-08-29 PROCEDURE — 99285 EMERGENCY DEPT VISIT HI MDM: CPT | Mod: CS

## 2022-08-29 PROCEDURE — 84295 ASSAY OF SERUM SODIUM: CPT | Performed by: EMERGENCY MEDICINE

## 2022-08-29 PROCEDURE — 81001 URINALYSIS AUTO W/SCOPE: CPT | Performed by: EMERGENCY MEDICINE

## 2022-08-29 PROCEDURE — 36415 COLL VENOUS BLD VENIPUNCTURE: CPT | Performed by: EMERGENCY MEDICINE

## 2022-08-29 PROCEDURE — 80053 COMPREHEN METABOLIC PANEL: CPT | Performed by: EMERGENCY MEDICINE

## 2022-08-29 PROCEDURE — 85025 COMPLETE CBC W/AUTO DIFF WBC: CPT | Performed by: EMERGENCY MEDICINE

## 2022-08-29 PROCEDURE — U0002 COVID-19 LAB TEST NON-CDC: HCPCS | Performed by: NURSE PRACTITIONER

## 2022-08-29 RX ORDER — LISINOPRIL 20 MG/1
40 TABLET ORAL DAILY
Status: DISCONTINUED | OUTPATIENT
Start: 2022-08-29 | End: 2022-08-30 | Stop reason: HOSPADM

## 2022-08-29 RX ORDER — ASPIRIN 81 MG/1
81 TABLET ORAL DAILY
Status: DISCONTINUED | OUTPATIENT
Start: 2022-08-29 | End: 2022-08-30 | Stop reason: HOSPADM

## 2022-08-29 RX ORDER — MAGNESIUM SULFATE HEPTAHYDRATE 40 MG/ML
2 INJECTION, SOLUTION INTRAVENOUS
Status: DISCONTINUED | OUTPATIENT
Start: 2022-08-29 | End: 2022-08-30 | Stop reason: HOSPADM

## 2022-08-29 RX ORDER — MAGNESIUM SULFATE HEPTAHYDRATE 40 MG/ML
4 INJECTION, SOLUTION INTRAVENOUS
Status: DISCONTINUED | OUTPATIENT
Start: 2022-08-29 | End: 2022-08-30 | Stop reason: HOSPADM

## 2022-08-29 RX ORDER — ACETAMINOPHEN 325 MG/1
650 TABLET ORAL EVERY 8 HOURS PRN
Status: DISCONTINUED | OUTPATIENT
Start: 2022-08-29 | End: 2022-08-30 | Stop reason: HOSPADM

## 2022-08-29 RX ORDER — SIMETHICONE 80 MG
1 TABLET,CHEWABLE ORAL 4 TIMES DAILY PRN
Status: DISCONTINUED | OUTPATIENT
Start: 2022-08-29 | End: 2022-08-30 | Stop reason: HOSPADM

## 2022-08-29 RX ORDER — CETIRIZINE HYDROCHLORIDE 5 MG/1
5 TABLET ORAL NIGHTLY
Status: DISCONTINUED | OUTPATIENT
Start: 2022-08-29 | End: 2022-08-30 | Stop reason: HOSPADM

## 2022-08-29 RX ORDER — ONDANSETRON 2 MG/ML
4 INJECTION INTRAMUSCULAR; INTRAVENOUS EVERY 6 HOURS PRN
Status: DISCONTINUED | OUTPATIENT
Start: 2022-08-29 | End: 2022-08-30 | Stop reason: HOSPADM

## 2022-08-29 RX ORDER — MORPHINE SULFATE 4 MG/ML
4 INJECTION, SOLUTION INTRAMUSCULAR; INTRAVENOUS EVERY 4 HOURS PRN
Status: DISCONTINUED | OUTPATIENT
Start: 2022-08-29 | End: 2022-08-30 | Stop reason: HOSPADM

## 2022-08-29 RX ORDER — HYDROCODONE BITARTRATE AND ACETAMINOPHEN 5; 325 MG/1; MG/1
1 TABLET ORAL EVERY 4 HOURS PRN
Status: DISCONTINUED | OUTPATIENT
Start: 2022-08-29 | End: 2022-08-30 | Stop reason: HOSPADM

## 2022-08-29 RX ORDER — FLUTICASONE PROPIONATE 50 MCG
2 SPRAY, SUSPENSION (ML) NASAL DAILY
Status: DISCONTINUED | OUTPATIENT
Start: 2022-08-30 | End: 2022-08-30 | Stop reason: HOSPADM

## 2022-08-29 RX ORDER — TALC
6 POWDER (GRAM) TOPICAL NIGHTLY PRN
Status: DISCONTINUED | OUTPATIENT
Start: 2022-08-29 | End: 2022-08-30 | Stop reason: HOSPADM

## 2022-08-29 RX ORDER — IBUPROFEN 200 MG
24 TABLET ORAL
Status: DISCONTINUED | OUTPATIENT
Start: 2022-08-29 | End: 2022-08-30 | Stop reason: HOSPADM

## 2022-08-29 RX ORDER — IBUPROFEN 200 MG
16 TABLET ORAL
Status: DISCONTINUED | OUTPATIENT
Start: 2022-08-29 | End: 2022-08-30 | Stop reason: HOSPADM

## 2022-08-29 RX ORDER — POTASSIUM CHLORIDE 20 MEQ/1
40 TABLET, EXTENDED RELEASE ORAL
Status: DISCONTINUED | OUTPATIENT
Start: 2022-08-29 | End: 2022-08-30 | Stop reason: HOSPADM

## 2022-08-29 RX ORDER — POTASSIUM CHLORIDE 20 MEQ/1
20 TABLET, EXTENDED RELEASE ORAL
Status: DISCONTINUED | OUTPATIENT
Start: 2022-08-29 | End: 2022-08-30 | Stop reason: HOSPADM

## 2022-08-29 RX ORDER — SODIUM CHLORIDE 9 MG/ML
INJECTION, SOLUTION INTRAVENOUS CONTINUOUS
Status: DISCONTINUED | OUTPATIENT
Start: 2022-08-29 | End: 2022-08-29

## 2022-08-29 RX ORDER — ATORVASTATIN CALCIUM 20 MG/1
20 TABLET, FILM COATED ORAL NIGHTLY
Status: DISCONTINUED | OUTPATIENT
Start: 2022-08-29 | End: 2022-08-30 | Stop reason: HOSPADM

## 2022-08-29 RX ORDER — AMOXICILLIN 250 MG
1 CAPSULE ORAL 2 TIMES DAILY PRN
Status: DISCONTINUED | OUTPATIENT
Start: 2022-08-29 | End: 2022-08-30 | Stop reason: HOSPADM

## 2022-08-29 RX ORDER — POTASSIUM CHLORIDE 7.45 MG/ML
20 INJECTION INTRAVENOUS
Status: DISCONTINUED | OUTPATIENT
Start: 2022-08-29 | End: 2022-08-30 | Stop reason: HOSPADM

## 2022-08-29 RX ORDER — AMLODIPINE BESYLATE 2.5 MG/1
2.5 TABLET ORAL DAILY
Status: DISCONTINUED | OUTPATIENT
Start: 2022-08-30 | End: 2022-08-30 | Stop reason: HOSPADM

## 2022-08-29 RX ORDER — MAGNESIUM SULFATE 1 G/100ML
1 INJECTION INTRAVENOUS
Status: DISCONTINUED | OUTPATIENT
Start: 2022-08-29 | End: 2022-08-30 | Stop reason: HOSPADM

## 2022-08-29 RX ORDER — POTASSIUM CHLORIDE 7.45 MG/ML
40 INJECTION INTRAVENOUS
Status: DISCONTINUED | OUTPATIENT
Start: 2022-08-29 | End: 2022-08-30 | Stop reason: HOSPADM

## 2022-08-29 RX ORDER — GLUCAGON 1 MG
1 KIT INJECTION
Status: DISCONTINUED | OUTPATIENT
Start: 2022-08-29 | End: 2022-08-30 | Stop reason: HOSPADM

## 2022-08-29 RX ORDER — ATENOLOL 50 MG/1
50 TABLET ORAL 2 TIMES DAILY
Status: DISCONTINUED | OUTPATIENT
Start: 2022-08-29 | End: 2022-08-30

## 2022-08-29 RX ORDER — PANTOPRAZOLE SODIUM 40 MG/1
40 TABLET, DELAYED RELEASE ORAL
Status: DISCONTINUED | OUTPATIENT
Start: 2022-08-30 | End: 2022-08-30 | Stop reason: HOSPADM

## 2022-08-29 RX ORDER — NALOXONE HCL 0.4 MG/ML
0.02 VIAL (ML) INJECTION
Status: DISCONTINUED | OUTPATIENT
Start: 2022-08-29 | End: 2022-08-30 | Stop reason: HOSPADM

## 2022-08-29 RX ORDER — IPRATROPIUM BROMIDE AND ALBUTEROL SULFATE 2.5; .5 MG/3ML; MG/3ML
3 SOLUTION RESPIRATORY (INHALATION) EVERY 4 HOURS PRN
Status: DISCONTINUED | OUTPATIENT
Start: 2022-08-29 | End: 2022-08-30 | Stop reason: HOSPADM

## 2022-08-29 RX ORDER — AZELASTINE 1 MG/ML
1 SPRAY, METERED NASAL 2 TIMES DAILY PRN
Status: DISCONTINUED | OUTPATIENT
Start: 2022-08-29 | End: 2022-08-30 | Stop reason: HOSPADM

## 2022-08-29 RX ORDER — POLYETHYLENE GLYCOL 3350 17 G/17G
17 POWDER, FOR SOLUTION ORAL 2 TIMES DAILY PRN
Status: DISCONTINUED | OUTPATIENT
Start: 2022-08-29 | End: 2022-08-30 | Stop reason: HOSPADM

## 2022-08-29 RX ORDER — LEVOTHYROXINE SODIUM 112 UG/1
112 TABLET ORAL
Status: DISCONTINUED | OUTPATIENT
Start: 2022-08-30 | End: 2022-08-30 | Stop reason: HOSPADM

## 2022-08-29 RX ORDER — SODIUM CHLORIDE 0.9 % (FLUSH) 0.9 %
10 SYRINGE (ML) INJECTION
Status: DISCONTINUED | OUTPATIENT
Start: 2022-08-29 | End: 2022-08-30 | Stop reason: HOSPADM

## 2022-08-29 RX ORDER — LANOLIN ALCOHOL/MO/W.PET/CERES
800 CREAM (GRAM) TOPICAL
Status: DISCONTINUED | OUTPATIENT
Start: 2022-08-29 | End: 2022-08-30 | Stop reason: HOSPADM

## 2022-08-29 RX ADMIN — APIXABAN 2.5 MG: 2.5 TABLET, FILM COATED ORAL at 09:08

## 2022-08-29 RX ADMIN — ATENOLOL 50 MG: 50 TABLET ORAL at 09:08

## 2022-08-29 RX ADMIN — ASPIRIN 81 MG: 81 TABLET, COATED ORAL at 04:08

## 2022-08-29 RX ADMIN — THERA TABS 1 TABLET: TAB at 04:08

## 2022-08-29 RX ADMIN — ATORVASTATIN CALCIUM 20 MG: 20 TABLET, FILM COATED ORAL at 09:08

## 2022-08-29 RX ADMIN — ACETAMINOPHEN 650 MG: 325 TABLET ORAL at 04:08

## 2022-08-29 RX ADMIN — LISINOPRIL 40 MG: 20 TABLET ORAL at 04:08

## 2022-08-29 NOTE — HPI
.Ama Lang is a 89 y.o. female with a history as  has a past medical history of *Atrial fibrillation, Cancer, Cataract, Diverticulitis, Hyperlipidemia, Hypertension, and Hypothyroidism. who presented to the ED with a Hyponatremia (Pt called to come back here to get her sodium checked again. 128 at last visit. Pt is not experiencing confusion or muscle tremors. )  Denies fever, chills, diaphoresis, SOB, dizziness, HA, chest pain, palpitations, NVD, recent trauma or any other associated symptoms.   Lab and imaging obtained and reviewed.     Blood chemistry reviling Na+ 125, Urinalysis negative for acute findings. Patient will be admitted to hospital medicine for further medical management.

## 2022-08-29 NOTE — H&P
[facility]  History & Physical    Patient Name: Ama Lang  MRN: [id number]  Patient Class: OP- Observation  Admission Date: 8/29/2022  Attending Physician: Jayden York MD   Primary Care Provider: Dale Fulton MD         Patient information was obtained from patient, past medical records and ER records.     Subjective:     Principal Problem:Hyponatremia    Chief Complaint:   Chief Complaint   Patient presents with    Hyponatremia     Pt called to come back here to get her sodium checked again. 128 at last visit. Pt is not experiencing confusion or muscle tremors.         HPI: .Ama Lang is a 89 y.o. female with a history as  has a past medical history of *Atrial fibrillation, Cancer, Cataract, Diverticulitis, Hyperlipidemia, Hypertension, and Hypothyroidism. who presented to the ED with a Hyponatremia (Pt called to come back here to get her sodium checked again. 128 at last visit. Pt is not experiencing confusion or muscle tremors. )  Denies fever, chills, diaphoresis, SOB, dizziness, HA, chest pain, palpitations, NVD, recent trauma or any other associated symptoms.   Lab and imaging obtained and reviewed.     Blood chemistry reviling Na+ 125, Urinalysis negative for acute findings. Patient will be admitted to hospital medicine for further medical management.       Past Medical History:   Diagnosis Date    *Atrial fibrillation     Dr. Mullen    Cancer     skin cancer arms     Cataract     Diverticulitis     Hyperlipidemia     Hypertension     Hypothyroidism        Past Surgical History:   Procedure Laterality Date    COLONOSCOPY  12/18/2003    Dr. Santoyo    CYSTOSCOPY  2008    Dr. Nguyen    CYSTOSCOPY N/A 1/20/2021    Procedure: CYSTOSCOPY;  Surgeon: Khushbu Robbins MD;  Location: [facility] OR;  Service: Urology;  Laterality: N/A;    DILATION AND CURETTAGE OF UTERUS      EYE SURGERY      skin cancer bilateral arms  08/2019    Dr Soler skin cancer both  arms     TUBAL LIGATION         Review of patient's allergies indicates:   Allergen Reactions    Montelukast Other (See Comments)     Depleted sodium     Ciprofloxacin     Hydrochlorothiazide Other (See Comments)     Low sodium       No current facility-administered medications on file prior to encounter.     Current Outpatient Medications on File Prior to Encounter   Medication Sig    ACETAMINOPHEN (TYLENOL 8 HOUR ORAL) Take 2 capsules by mouth daily as needed.    alendronate (FOSAMAX) 70 MG tablet Take 1 tablet (70 mg total) by mouth every 7 days.    amLODIPine (NORVASC) 2.5 MG tablet Take 1 tablet (2.5 mg total) by mouth once daily.    apixaban (ELIQUIS) 2.5 mg Tab Take 2.5 mg by mouth 2 (two) times daily.     aspirin (ECOTRIN) 81 MG EC tablet Take 81 mg by mouth once daily.    atenoloL (TENORMIN) 50 MG tablet TAKE ONE TABLET BY MOUTH TWO TIMES A DAY    azelastine (ASTELIN) 137 mcg (0.1 %) nasal spray 1 spray (137 mcg total) by Nasal route 2 (two) times daily as needed for Rhinitis.    azithromycin (Z-NIMCO) 250 MG tablet Take by mouth.    cephALEXin (KEFLEX) 500 MG capsule Take 1 capsule (500 mg total) by mouth every 12 (twelve) hours. for 5 days    clobetasoL (CLOBEX) 0.05 % shampoo     clobetasoL (TEMOVATE) 0.05 % external solution     estradioL (ESTRACE) 0.01 % (0.1 mg/gram) vaginal cream Place 2 g vaginally twice a week. Place into vagina daily for two weeks and then spread out to twice a week.    fluticasone propionate (FLONASE) 50 mcg/actuation nasal spray INSTILL 2 SPRAYS IN EACH NOSTRIL ONCE A DAY    ketoconazole (NIZORAL) 2 % shampoo Apply topically 3 (three) times a week.    levocetirizine (XYZAL) 5 MG tablet Take 1 tablet (5 mg total) by mouth every evening. (Patient taking differently: Take 5 mg by mouth once daily.)    levothyroxine (SYNTHROID) 112 MCG tablet Take 0.5 tablets (56 mcg total) by mouth once daily.    lisinopriL (PRINIVIL,ZESTRIL) 40 MG tablet TAKE ONE TABLET BY MOUTH  EVERY DAY    mometasone (ELOCON) 0.1 % solution SMARTSIG:Sparingly Topical Twice Daily    multivitamin (THERAGRAN) per tablet Take 1 tablet by mouth daily as needed.     omeprazole (PRILOSEC) 20 MG capsule TAKE ONE CAPSULE BY MOUTH ONCE A DAY    simvastatin (ZOCOR) 20 MG tablet Take 1 tablet (20 mg total) by mouth every evening.    triamcinolone acetonide 0.025% (KENALOG) 0.025 % cream Apply topically 2 (two) times daily.    VIT C/VIT E/LUTEIN/MIN/OMEGA-3 (OCUVITE ORAL) Take 1 tablet by mouth once daily.    vitamin E 400 UNIT capsule Take 400 Units by mouth once daily.     Family History       Problem Relation (Age of Onset)    Arthritis Sister, Daughter    Cancer Mother, Sister, Brother    Carpal tunnel syndrome Son    Early death Daughter    Fibromyalgia Daughter    Heart disease Father, Brother    Román's disease Father, Daughter, Daughter    Hypertension Son    Macular degeneration Father          Tobacco Use    Smoking status: Never    Smokeless tobacco: Never   Substance and Sexual Activity    Alcohol use: No    Drug use: No    Sexual activity: Not Currently     Partners: Male     Review of Systems   Constitutional:  Positive for activity change and fatigue.   Respiratory:  Negative for chest tightness and shortness of breath.    Cardiovascular:  Negative for chest pain and palpitations.   Musculoskeletal:  Positive for myalgias.   Objective:     Vital Signs (Most Recent):  Temp: 97.6 °F (36.4 °C) (08/29/22 1011)  Pulse: 80 (08/29/22 1200)  Resp: (!) 21 (08/29/22 1202)  BP: (!) 154/83 (08/29/22 1200)  SpO2: 96 % (08/29/22 1200)   Vital Signs (24h Range):  Temp:  [97.6 °F (36.4 °C)] 97.6 °F (36.4 °C)  Pulse:  [74-80] 80  Resp:  [18-21] 21  SpO2:  [96 %] 96 %  BP: (154)/(83-86) 154/83     Weight: 60.8 kg (134 lb)  Body mass index is 23 kg/m².    Physical Exam  Vitals reviewed.   Constitutional:       Comments: Elderly woman   HENT:      Head: Normocephalic and atraumatic.      Nose: Nose normal.    Eyes:      Pupils: Pupils are equal, round, and reactive to light.   Cardiovascular:      Rate and Rhythm: Normal rate.   Pulmonary:      Effort: Pulmonary effort is normal.   Abdominal:      General: Bowel sounds are normal. There is no distension.      Tenderness: There is no abdominal tenderness.   Musculoskeletal:      Cervical back: Neck supple.   Skin:     General: Skin is warm.   Neurological:      Mental Status: She is alert and oriented to person, place, and time.      Cranial Nerves: No facial asymmetry.      Sensory: Sensation is intact.      Motor: Weakness present.   Psychiatric:         Attention and Perception: Attention normal.         Mood and Affect: Mood normal.         Speech: Speech normal.         Behavior: Behavior normal. Behavior is cooperative.         Thought Content: Thought content normal.         Cognition and Memory: Cognition normal.         CRANIAL NERVES     CN III, IV, VI   Pupils are equal, round, and reactive to light.     Significant Labs: All pertinent labs within the past 24 hours have been reviewed.    Significant Imaging: I have reviewed all pertinent imaging results/findings within the past 24 hours.    Assessment/Plan:     * Hyponatremia  BMP q 4hrs  Fluid restriction 1L  Neuro checks  Telemetry  Replacement       Acid indigestion  Protonix 40mg po daily.      Essential hypertension  Monitor labs and vitals   Continue home medications. Adjust per patient condition.       Hyperlipidemia  Stable  Continue home medication      Hypothyroidism  Continue home medication         VTE Risk Mitigation (From admission, onward)    None             Dottie Ambrose NP  Department of Hospital Medicine   [facility]

## 2022-08-29 NOTE — SUBJECTIVE & OBJECTIVE
Past Medical History:   Diagnosis Date    *Atrial fibrillation     Dr. Mullen    Cancer     skin cancer arms     Cataract     Diverticulitis     Hyperlipidemia     Hypertension     Hypothyroidism        Past Surgical History:   Procedure Laterality Date    COLONOSCOPY  12/18/2003    Dr. Santoyo    CYSTOSCOPY  2008    Dr. Nguyen    CYSTOSCOPY N/A 1/20/2021    Procedure: CYSTOSCOPY;  Surgeon: Khushbu Robbins MD;  Location: [facility] OR;  Service: Urology;  Laterality: N/A;    DILATION AND CURETTAGE OF UTERUS      EYE SURGERY      skin cancer bilateral arms  08/2019    Dr Soler skin cancer both arms     TUBAL LIGATION         Review of patient's allergies indicates:   Allergen Reactions    Montelukast Other (See Comments)     Depleted sodium     Ciprofloxacin     Hydrochlorothiazide Other (See Comments)     Low sodium       No current facility-administered medications on file prior to encounter.     Current Outpatient Medications on File Prior to Encounter   Medication Sig    ACETAMINOPHEN (TYLENOL 8 HOUR ORAL) Take 2 capsules by mouth daily as needed.    alendronate (FOSAMAX) 70 MG tablet Take 1 tablet (70 mg total) by mouth every 7 days.    amLODIPine (NORVASC) 2.5 MG tablet Take 1 tablet (2.5 mg total) by mouth once daily.    apixaban (ELIQUIS) 2.5 mg Tab Take 2.5 mg by mouth 2 (two) times daily.     aspirin (ECOTRIN) 81 MG EC tablet Take 81 mg by mouth once daily.    atenoloL (TENORMIN) 50 MG tablet TAKE ONE TABLET BY MOUTH TWO TIMES A DAY    azelastine (ASTELIN) 137 mcg (0.1 %) nasal spray 1 spray (137 mcg total) by Nasal route 2 (two) times daily as needed for Rhinitis.    azithromycin (Z-NIMCO) 250 MG tablet Take by mouth.    cephALEXin (KEFLEX) 500 MG capsule Take 1 capsule (500 mg total) by mouth every 12 (twelve) hours. for 5 days    clobetasoL (CLOBEX) 0.05 % shampoo     clobetasoL (TEMOVATE) 0.05 % external solution     estradioL (ESTRACE) 0.01 % (0.1 mg/gram) vaginal cream Place 2 g vaginally twice a week.  Place into vagina daily for two weeks and then spread out to twice a week.    fluticasone propionate (FLONASE) 50 mcg/actuation nasal spray INSTILL 2 SPRAYS IN EACH NOSTRIL ONCE A DAY    ketoconazole (NIZORAL) 2 % shampoo Apply topically 3 (three) times a week.    levocetirizine (XYZAL) 5 MG tablet Take 1 tablet (5 mg total) by mouth every evening. (Patient taking differently: Take 5 mg by mouth once daily.)    levothyroxine (SYNTHROID) 112 MCG tablet Take 0.5 tablets (56 mcg total) by mouth once daily.    lisinopriL (PRINIVIL,ZESTRIL) 40 MG tablet TAKE ONE TABLET BY MOUTH EVERY DAY    mometasone (ELOCON) 0.1 % solution SMARTSIG:Sparingly Topical Twice Daily    multivitamin (THERAGRAN) per tablet Take 1 tablet by mouth daily as needed.     omeprazole (PRILOSEC) 20 MG capsule TAKE ONE CAPSULE BY MOUTH ONCE A DAY    simvastatin (ZOCOR) 20 MG tablet Take 1 tablet (20 mg total) by mouth every evening.    triamcinolone acetonide 0.025% (KENALOG) 0.025 % cream Apply topically 2 (two) times daily.    VIT C/VIT E/LUTEIN/MIN/OMEGA-3 (OCUVITE ORAL) Take 1 tablet by mouth once daily.    vitamin E 400 UNIT capsule Take 400 Units by mouth once daily.     Family History       Problem Relation (Age of Onset)    Arthritis Sister, Daughter    Cancer Mother, Sister, Brother    Carpal tunnel syndrome Son    Early death Daughter    Fibromyalgia Daughter    Heart disease Father, Brother    Román's disease Father, Daughter, Daughter    Hypertension Son    Macular degeneration Father          Tobacco Use    Smoking status: Never    Smokeless tobacco: Never   Substance and Sexual Activity    Alcohol use: No    Drug use: No    Sexual activity: Not Currently     Partners: Male     Review of Systems   Constitutional:  Positive for activity change and fatigue.   Respiratory:  Negative for chest tightness and shortness of breath.    Cardiovascular:  Negative for chest pain and palpitations.   Musculoskeletal:  Positive for myalgias.    Objective:     Vital Signs (Most Recent):  Temp: 97.6 °F (36.4 °C) (08/29/22 1011)  Pulse: 80 (08/29/22 1200)  Resp: (!) 21 (08/29/22 1202)  BP: (!) 154/83 (08/29/22 1200)  SpO2: 96 % (08/29/22 1200)   Vital Signs (24h Range):  Temp:  [97.6 °F (36.4 °C)] 97.6 °F (36.4 °C)  Pulse:  [74-80] 80  Resp:  [18-21] 21  SpO2:  [96 %] 96 %  BP: (154)/(83-86) 154/83     Weight: 60.8 kg (134 lb)  Body mass index is 23 kg/m².    Physical Exam  Vitals reviewed.   Constitutional:       Comments: Elderly woman   HENT:      Head: Normocephalic and atraumatic.      Nose: Nose normal.   Eyes:      Pupils: Pupils are equal, round, and reactive to light.   Cardiovascular:      Rate and Rhythm: Normal rate.   Pulmonary:      Effort: Pulmonary effort is normal.   Abdominal:      General: Bowel sounds are normal. There is no distension.      Tenderness: There is no abdominal tenderness.   Musculoskeletal:      Cervical back: Neck supple.   Skin:     General: Skin is warm.   Neurological:      Mental Status: She is alert and oriented to person, place, and time.      Cranial Nerves: No facial asymmetry.      Sensory: Sensation is intact.      Motor: Weakness present.   Psychiatric:         Attention and Perception: Attention normal.         Mood and Affect: Mood normal.         Speech: Speech normal.         Behavior: Behavior normal. Behavior is cooperative.         Thought Content: Thought content normal.         Cognition and Memory: Cognition normal.         CRANIAL NERVES     CN III, IV, VI   Pupils are equal, round, and reactive to light.     Significant Labs: All pertinent labs within the past 24 hours have been reviewed.    Significant Imaging: I have reviewed all pertinent imaging results/findings within the past 24 hours.

## 2022-08-29 NOTE — PLAN OF CARE
08/29/22 1758   Patient Assessment/Suction   Level of Consciousness (AVPU) alert   Respiratory Effort Unlabored   Expansion/Accessory Muscles/Retractions no use of accessory muscles   Rhythm/Pattern, Respiratory unlabored;pattern regular;depth regular   Cough Frequency infrequent   PRE-TX-O2   O2 Device (Oxygen Therapy) room air   SpO2 95 %   Pulse (!) 8   Resp 18   Aerosol Therapy   $ Aerosol Therapy Charges PRN treatment not required   Education   $ Education Bronchodilator;15 min   Respiratory Evaluation   $ Care Plan Tech Time 15 min   $ Eval/Re-eval Charges Evaluation   Evaluation For New Orders

## 2022-08-29 NOTE — ED PROVIDER NOTES
Encounter Date: 8/29/2022       History     Chief Complaint   Patient presents with    Hyponatremia     Pt called to come back here to get her sodium checked again. 128 at last visit. Pt is not experiencing confusion or muscle tremors.      89-year-old female with history of hypertension hyperlipidemia hypothyroidism AFib on Eliquis and aspirin presents to the ER at the request of Dr. Vickers.  Patient been seen in the ER 3 days ago due to fatigue and muscle cramping and had a sodium 128.  He was checking in on the patient and told her to return to the ER for re-evaluation of the hyponatremia.  She reports she was still having fatigue at home.  She also reports she has been having intermittent cramping in the lower extremities and has a headache.  No nausea or vomiting no confusion no seizure disorder.  She reports she has been drinking a great deal of water because she has been very thirsty.  And had been limiting her salt intake due to her cardiac issues and being told by her cardiologist due to limit salt.  She reports she has had hyponatremia requiring admission once the past 2-3 years ago.    Review of patient's allergies indicates:   Allergen Reactions    Montelukast Other (See Comments)     Depleted sodium     Ciprofloxacin     Hydrochlorothiazide Other (See Comments)     Low sodium     Past Medical History:   Diagnosis Date    *Atrial fibrillation     Dr. Mullen    Cancer     skin cancer arms     Cataract     Diverticulitis     Hyperlipidemia     Hypertension     Hypothyroidism      Past Surgical History:   Procedure Laterality Date    COLONOSCOPY  12/18/2003    Dr. Santoyo    CYSTOSCOPY  2008    Dr. Nguyen    CYSTOSCOPY N/A 1/20/2021    Procedure: CYSTOSCOPY;  Surgeon: Khushbu Robbins MD;  Location: [facility] OR;  Service: Urology;  Laterality: N/A;    DILATION AND CURETTAGE OF UTERUS      EYE SURGERY      skin cancer bilateral arms  08/2019    Dr Soler skin cancer both arms     TUBAL LIGATION       Family  History   Problem Relation Age of Onset    Cancer Mother         ovarian    Heart disease Father     Román's disease Father     Macular degeneration Father     Heart disease Brother     Cancer Sister         blood    Cancer Brother     [place]'s disease Daughter     Early death Daughter     Arthritis Sister         spine    Carpal tunnel syndrome Son     Hypertension Son     Fibromyalgia Daughter     Román's disease Daughter     Arthritis Daughter         x2    Kidney cancer Neg Hx     Prostate cancer Neg Hx     Urolithiasis Neg Hx      Social History     Tobacco Use    Smoking status: Never    Smokeless tobacco: Never   Substance Use Topics    Alcohol use: No    Drug use: No     Review of Systems   Constitutional:  Positive for activity change and fatigue. Negative for appetite change, chills, diaphoresis and fever.   Respiratory:  Negative for cough and shortness of breath.    Cardiovascular:  Negative for chest pain.   Gastrointestinal:  Negative for abdominal pain.   Endocrine: Positive for polydipsia.   Musculoskeletal:  Positive for myalgias.   Skin:  Negative for color change.   Neurological:  Positive for headaches. Negative for dizziness, tremors, seizures, syncope, weakness, light-headedness and numbness.   Psychiatric/Behavioral:  Negative for confusion.    All other systems reviewed and are negative.    Physical Exam     Initial Vitals [08/29/22 1011]   BP Pulse Resp Temp SpO2   (!) 154/86 74 18 97.6 °F (36.4 °C) 96 %      MAP       --         Physical Exam    Nursing note and vitals reviewed.  Constitutional: She appears well-developed and well-nourished. She is not diaphoretic. No distress.   HENT:   Head: Normocephalic and atraumatic.   Right Ear: External ear normal.   Left Ear: External ear normal.   Nose: Nose normal.   Mouth/Throat: Oropharynx is clear and moist.   Eyes: Conjunctivae and EOM are normal. Pupils are equal, round, and reactive to light.   Neck: Neck supple. No tracheal  deviation present.   Normal range of motion.  Cardiovascular:  Normal rate, regular rhythm, normal heart sounds and intact distal pulses.     Exam reveals no gallop and no friction rub.       No murmur heard.  Blood pressure 154/83 pulse 80   Pulmonary/Chest: Breath sounds normal. No stridor. No respiratory distress. She has no wheezes. She has no rhonchi. She has no rales. She exhibits no tenderness.   Abdominal: Abdomen is soft. Bowel sounds are normal. She exhibits no distension and no mass. There is no abdominal tenderness. There is no rebound and no guarding.   Musculoskeletal:         General: No edema. Normal range of motion.      Cervical back: Normal range of motion and neck supple.     Neurological: She is alert and oriented to person, place, and time. She has normal strength. No cranial nerve deficit or sensory deficit.   Skin: Skin is warm and dry. No rash noted. No erythema. No pallor.   Psychiatric: She has a normal mood and affect. Her behavior is normal. Judgment and thought content normal.       ED Course   Procedures  Labs Reviewed   CBC W/ AUTO DIFFERENTIAL - Abnormal; Notable for the following components:       Result Value    RBC 3.47 (*)     Hematocrit 36.2 (*)      (*)     MCH 35.4 (*)     MPV 13.9 (*)     Immature Granulocytes 1.1 (*)     Immature Grans (Abs) 0.05 (*)     All other components within normal limits   COMPREHENSIVE METABOLIC PANEL - Abnormal; Notable for the following components:    Sodium 125 (*)     Chloride 90 (*)     Calcium 10.7 (*)     Total Bilirubin 1.1 (*)     Anion Gap 6 (*)     All other components within normal limits   URINALYSIS, REFLEX TO URINE CULTURE - Abnormal; Notable for the following components:    Color, UA Orange (*)     All other components within normal limits    Narrative:     Specimen Source->Urine   URINALYSIS MICROSCOPIC - Abnormal; Notable for the following components:    Hyaline Casts, UA 4 (*)     All other components within normal limits     Narrative:     Specimen Source->Urine   MAGNESIUM   SARS-COV-2 RNA AMPLIFICATION, QUAL          Imaging Results    None          Medications   0.9%  NaCl infusion (has no administration in time range)     Medical Decision Making:   History:   I obtained history from: someone other than patient and primary care / consultant.  Old Medical Records: I decided to obtain old medical records.  Old Records Summarized: other records.  Initial Assessment:   89-year-old female with history of hypertension hyperlipidemia hypothyroidism AFib on Eliquis and aspirin presents to the ER at the request of Dr. Vickers.  Patient been seen in the ER 3 days ago due to fatigue and muscle cramping and had a sodium 128.  He was checking in on the patient and told her to return to the ER for re-evaluation of the hyponatremia.  She reports she was still having fatigue at home.  She also reports she has been having intermittent cramping in the lower extremities and has a headache.  No nausea or vomiting no confusion no seizure disorder.  She reports she has been drinking a great deal of water because she has been very thirsty.  And had been limiting her salt intake due to her cardiac issues and being told by her cardiologist due to limit salt.  She reports she has had hyponatremia requiring admission once the past 2-3 years ago.  On physical exam blood pressure mildly elevated otherwise patient is in no distress and is well-appearing normal cardiac and lung exam soft nontender abdomen.  Patient has 2 bottles of water at bedside that she is drinking.  I have spoken with Dr. Vickers about her re-evaluation.  Lab work has returned and sodium has decreased from 01/28 3 days ago to 125 today.  Chloride is 90, calcium 10.7.  Patient had mild decrease in chloride 3 days ago and mild elevation in calcium 3 days ago.  Magnesium is normal.  Patient will be water restricted here due to likely being and delusional hypo natremia.  His agreeable with  admission.  COVID test has been added.  Chanel Menendez M.D.  1:38 PM 8/29/2022    Clinical Tests:   Lab Tests: Ordered and Reviewed  The following lab test(s) were unremarkable: CBC       <> Summary of Lab: Sodium 125 chloride 90 calcium 10.7 bili 1.1  Magnesium 2.1    Urine is orange the color is interfering with evaluation of the rest of the sample.                    Clinical Impression:   Final diagnoses:  [E87.1] Hyponatremia (Primary)  [E87.8] Hypochloremia  [R53.83] Fatigue, unspecified type  [R25.2] Muscle cramps        ED Disposition Condition    Observation                 Chanel Menendez MD  08/29/22 6852

## 2022-08-30 ENCOUNTER — TELEPHONE (OUTPATIENT)
Dept: FAMILY MEDICINE | Facility: CLINIC | Age: 87
End: 2022-08-30
Payer: MEDICARE

## 2022-08-30 VITALS
RESPIRATION RATE: 16 BRPM | WEIGHT: 134 LBS | BODY MASS INDEX: 22.88 KG/M2 | SYSTOLIC BLOOD PRESSURE: 148 MMHG | DIASTOLIC BLOOD PRESSURE: 77 MMHG | HEART RATE: 63 BPM | OXYGEN SATURATION: 95 % | HEIGHT: 64 IN | TEMPERATURE: 98 F

## 2022-08-30 LAB
ACANTHOCYTES BLD QL SMEAR: PRESENT
ANION GAP SERPL CALC-SCNC: 5 MMOL/L (ref 8–16)
ANION GAP SERPL CALC-SCNC: 6 MMOL/L (ref 8–16)
BASOPHILS # BLD AUTO: 0.06 K/UL (ref 0–0.2)
BASOPHILS NFR BLD: 2.1 % (ref 0–1.9)
BUN SERPL-MCNC: 11 MG/DL (ref 10–30)
BUN SERPL-MCNC: 13 MG/DL (ref 10–30)
CALCIUM SERPL-MCNC: 10.2 MG/DL (ref 8.7–10.5)
CALCIUM SERPL-MCNC: 9.9 MG/DL (ref 8.7–10.5)
CHLORIDE SERPL-SCNC: 92 MMOL/L (ref 95–110)
CHLORIDE SERPL-SCNC: 94 MMOL/L (ref 95–110)
CO2 SERPL-SCNC: 29 MMOL/L (ref 23–29)
CO2 SERPL-SCNC: 31 MMOL/L (ref 23–29)
CREAT SERPL-MCNC: 0.6 MG/DL (ref 0.5–1.4)
CREAT SERPL-MCNC: 0.6 MG/DL (ref 0.5–1.4)
DIFFERENTIAL METHOD: ABNORMAL
EOSINOPHIL # BLD AUTO: 0.1 K/UL (ref 0–0.5)
EOSINOPHIL NFR BLD: 2.5 % (ref 0–8)
ERYTHROCYTE [DISTWIDTH] IN BLOOD BY AUTOMATED COUNT: 12.9 % (ref 11.5–14.5)
EST. GFR  (NO RACE VARIABLE): >60 ML/MIN/1.73 M^2
EST. GFR  (NO RACE VARIABLE): >60 ML/MIN/1.73 M^2
GLUCOSE SERPL-MCNC: 108 MG/DL (ref 70–110)
GLUCOSE SERPL-MCNC: 117 MG/DL (ref 70–110)
HCT VFR BLD AUTO: 33.7 % (ref 37–48.5)
HGB BLD-MCNC: 11.7 G/DL (ref 12–16)
IMM GRANULOCYTES # BLD AUTO: 0.04 K/UL (ref 0–0.04)
IMM GRANULOCYTES NFR BLD AUTO: 1.4 % (ref 0–0.5)
LYMPHOCYTES # BLD AUTO: 1 K/UL (ref 1–4.8)
LYMPHOCYTES NFR BLD: 35 % (ref 18–48)
MAGNESIUM SERPL-MCNC: 2.1 MG/DL (ref 1.6–2.6)
MCH RBC QN AUTO: 36.2 PG (ref 27–31)
MCHC RBC AUTO-ENTMCNC: 34.7 G/DL (ref 32–36)
MCV RBC AUTO: 104 FL (ref 82–98)
MONOCYTES # BLD AUTO: 0.4 K/UL (ref 0.3–1)
MONOCYTES NFR BLD: 13.1 % (ref 4–15)
NEUTROPHILS # BLD AUTO: 1.3 K/UL (ref 1.8–7.7)
NEUTROPHILS NFR BLD: 45.9 % (ref 38–73)
NRBC BLD-RTO: 0 /100 WBC
PLATELET # BLD AUTO: 132 K/UL (ref 150–450)
PLATELET BLD QL SMEAR: ABNORMAL
PMV BLD AUTO: 13.8 FL (ref 9.2–12.9)
POIKILOCYTOSIS BLD QL SMEAR: SLIGHT
POTASSIUM SERPL-SCNC: 4.5 MMOL/L (ref 3.5–5.1)
POTASSIUM SERPL-SCNC: 5 MMOL/L (ref 3.5–5.1)
RBC # BLD AUTO: 3.23 M/UL (ref 4–5.4)
SCHISTOCYTES BLD QL SMEAR: PRESENT
SODIUM SERPL-SCNC: 126 MMOL/L (ref 136–145)
SODIUM SERPL-SCNC: 131 MMOL/L (ref 136–145)
SODIUM UR-SCNC: 42 MMOL/L (ref 20–250)
WBC # BLD AUTO: 2.83 K/UL (ref 3.9–12.7)

## 2022-08-30 PROCEDURE — 93010 ELECTROCARDIOGRAM REPORT: CPT | Mod: ,,, | Performed by: INTERNAL MEDICINE

## 2022-08-30 PROCEDURE — 25000003 PHARM REV CODE 250: Performed by: NURSE PRACTITIONER

## 2022-08-30 PROCEDURE — 85025 COMPLETE CBC W/AUTO DIFF WBC: CPT | Performed by: NURSE PRACTITIONER

## 2022-08-30 PROCEDURE — 80048 BASIC METABOLIC PNL TOTAL CA: CPT | Performed by: NURSE PRACTITIONER

## 2022-08-30 PROCEDURE — 80048 BASIC METABOLIC PNL TOTAL CA: CPT | Mod: 91 | Performed by: NURSE PRACTITIONER

## 2022-08-30 PROCEDURE — 99204 OFFICE O/P NEW MOD 45 MIN: CPT | Mod: 25,,, | Performed by: INTERNAL MEDICINE

## 2022-08-30 PROCEDURE — 36415 COLL VENOUS BLD VENIPUNCTURE: CPT | Performed by: NURSE PRACTITIONER

## 2022-08-30 PROCEDURE — G0378 HOSPITAL OBSERVATION PER HR: HCPCS | Mod: CS

## 2022-08-30 PROCEDURE — 93005 ELECTROCARDIOGRAM TRACING: CPT | Performed by: INTERNAL MEDICINE

## 2022-08-30 PROCEDURE — 93010 EKG 12-LEAD: ICD-10-PCS | Mod: ,,, | Performed by: INTERNAL MEDICINE

## 2022-08-30 PROCEDURE — 25000242 PHARM REV CODE 250 ALT 637 W/ HCPCS: Performed by: NURSE PRACTITIONER

## 2022-08-30 PROCEDURE — 83735 ASSAY OF MAGNESIUM: CPT | Performed by: NURSE PRACTITIONER

## 2022-08-30 PROCEDURE — 99204 PR OFFICE/OUTPT VISIT, NEW, LEVL IV, 45-59 MIN: ICD-10-PCS | Mod: 25,,, | Performed by: INTERNAL MEDICINE

## 2022-08-30 PROCEDURE — 84300 ASSAY OF URINE SODIUM: CPT | Performed by: NURSE PRACTITIONER

## 2022-08-30 PROCEDURE — 99900035 HC TECH TIME PER 15 MIN (STAT)

## 2022-08-30 RX ORDER — CEPHALEXIN 500 MG/1
500 CAPSULE ORAL EVERY 12 HOURS
Status: DISCONTINUED | OUTPATIENT
Start: 2022-08-30 | End: 2022-08-30 | Stop reason: HOSPADM

## 2022-08-30 RX ADMIN — CEPHALEXIN 500 MG: 500 CAPSULE ORAL at 12:08

## 2022-08-30 RX ADMIN — AMLODIPINE BESYLATE 2.5 MG: 2.5 TABLET ORAL at 09:08

## 2022-08-30 RX ADMIN — ASPIRIN 81 MG: 81 TABLET, COATED ORAL at 09:08

## 2022-08-30 RX ADMIN — ACETAMINOPHEN 650 MG: 325 TABLET ORAL at 02:08

## 2022-08-30 RX ADMIN — LISINOPRIL 40 MG: 20 TABLET ORAL at 09:08

## 2022-08-30 RX ADMIN — FLUTICASONE PROPIONATE 100 MCG: 50 SPRAY, METERED NASAL at 09:08

## 2022-08-30 RX ADMIN — LEVOTHYROXINE SODIUM 112 MCG: 0.11 TABLET ORAL at 06:08

## 2022-08-30 RX ADMIN — PANTOPRAZOLE SODIUM 40 MG: 40 TABLET, DELAYED RELEASE ORAL at 06:08

## 2022-08-30 RX ADMIN — SENNOSIDES AND DOCUSATE SODIUM 1 TABLET: 8.6; 5 TABLET ORAL at 09:08

## 2022-08-30 RX ADMIN — THERA TABS 1 TABLET: TAB at 09:08

## 2022-08-30 NOTE — TELEPHONE ENCOUNTER
----- Message from Alexus Devi sent at 8/30/2022  1:52 PM CDT -----  Contact:   Type: Patient Call Back         Who called: Kirstin -   Devon Castillo         What is the request in detail: calling to [facility] 1 week hospital f/u; discharge 8/30         Best call back number: [phone]         Additional Information: [phone] - Kirstin           Thank You

## 2022-08-30 NOTE — DISCHARGE SUMMARY
Critical access hospital Medicine  Discharge Summary      Patient Name: Ama Lang  MRN: [id number]  Patient Class: OP- Observation  Admission Date: 8/29/2022  Hospital Length of Stay: 0 days  Discharge Date and Time:  08/30/2022 1:32 PM  Attending Physician: Olivier Soto MD   Discharging Provider: Olivier Soto MD  Primary Care Provider: Dale Fulton MD      HPI:   .Ama Lang is a 89 y.o. female with a history as  has a past medical history of *Atrial fibrillation, Cancer, Cataract, Diverticulitis, Hyperlipidemia, Hypertension, and Hypothyroidism. who presented to the ED with a Hyponatremia (Pt called to come back here to get her sodium checked again. 128 at last visit. Pt is not experiencing confusion or muscle tremors. )  Denies fever, chills, diaphoresis, SOB, dizziness, HA, chest pain, palpitations, NVD, recent trauma or any other associated symptoms.   Lab and imaging obtained and reviewed.     Blood chemistry reviling Na+ 125, Urinalysis negative for acute findings. Patient will be admitted to hospital medicine for further medical management.       * No surgery found *      Hospital Course:   8/30/2022  Ms Lang is alert awake, oriented X 3 and strongly desires to go home. She lives with a daughter and is willing to work with home health  ROS: weakness intermittent otherwise negative X 9  PE in no distress HEENT MODESTO EOM intact moist mucus membranes Neck supple no use of accessory muscles Lungs no crackles wheezes or rhonchi Heart S 1 S 2 RRR no murmur Abdomen  BS+ nontender Ext without CC or E pulses 1-2+ Skin no acute finding Neuro no acute sensory or motor deficit       Goals of Care Treatment Preferences:  Code Status: Full Code      Consults:   Consults (From admission, onward)        Status Ordering Provider     Inpatient consult to   Once        Provider:  (Not yet assigned)    Ordered OLIVIER SOTO     Inpatient consult to Cardiology  Once         Provider:  Papo Mayer MD    Completed BRISEIDA BURNETTE     Inpatient consult to Registered Dietitian/Nutritionist  Once        Provider:  (Not yet assigned)    Completed MANN OLIVER     Inpatient consult to Hospitalist  Once        Provider:  Jayden York MD    Acknowledged MANN OLIVER          No new Assessment & Plan notes have been filed under this hospital service since the last note was generated.  Service: Hospital Medicine    Final Active Diagnoses:    Diagnosis Date Noted POA    PRINCIPAL PROBLEM:  Hyponatremia [E87.1] 08/29/2022 Yes    Acid indigestion [K30] 06/05/2019 Yes    Essential hypertension [I10] 11/27/2018 Yes    Hypothyroidism [E03.9]  Yes    Hyperlipidemia [E78.5]  Yes      Problems Resolved During this Admission:       Discharged Condition: good    Disposition: Home-Health Care c    Follow Up:   Follow-up Information     Kat Fulton MD Follow up in 1 week(s).    Specialty: Family Medicine  Contact information:  [address] [facility]  [address]  [place] [address]  [phone]             Christian Urena MD Follow up in 2 week(s).    Specialty: Nephrology  Contact information:  [address] [facility] [address]  [place] [address]  [phone]                       Patient Instructions:      Basic metabolic panel   Standing Status: Future Standing Exp. Date: 09/30/22     Ambulatory referral/consult to Home Health   Standing Status: Future   Referral Priority: Routine Referral Type: Home Health Care   Referral Reason: Specialty Services Required   Requested Specialty: Home Health Services   Number of Visits Requested: 1     Activity as tolerated       Significant Diagnostic Studies: Labs:   BMP:   Recent Labs   Lab 08/29/22  1138 08/29/22  1630 08/29/22 2002 08/30/22  0001 08/30/22  0539      < > 136* 117* 108   *  125*   < > 129* 126* 131*   K 4.6   < > 4.5 4.5 5.0   CL 90*   < > 90* 92* 94*   CO2 29   < > 30* 29 31*   BUN 12   < > 12 13  11   CREATININE 0.8   < > 0.6 0.6 0.6   CALCIUM 10.7*   < > 10.4 9.9 10.2   MG 2.1  --   --   --  2.1    < > = values in this interval not displayed.   , CMP   Recent Labs   Lab 08/29/22  1138 08/29/22  1630 08/29/22 2002 08/30/22  0001 08/30/22  0539   *  125*   < > 129* 126* 131*   K 4.6   < > 4.5 4.5 5.0   CL 90*   < > 90* 92* 94*   CO2 29   < > 30* 29 31*      < > 136* 117* 108   BUN 12   < > 12 13 11   CREATININE 0.8   < > 0.6 0.6 0.6   CALCIUM 10.7*   < > 10.4 9.9 10.2   PROT 7.3  --   --   --   --    ALBUMIN 4.4  --   --   --   --    BILITOT 1.1*  --   --   --   --    ALKPHOS 86  --   --   --   --    AST 35  --   --   --   --    ALT 23  --   --   --   --    ANIONGAP 6*   < > 9 5* 6*    < > = values in this interval not displayed.   , CBC   Recent Labs   Lab 08/29/22  1138 08/30/22  0539   WBC 4.58 2.83*   HGB 12.3 11.7*   HCT 36.2* 33.7*    132*   , INR   Lab Results   Component Value Date    INR 1.8 (H) 08/07/2012   , Troponin   Recent Labs   Lab 08/26/22  1341   TROPONINI <0.030    and All labs within the past 24 hours have been reviewed  Microbiology:   Blood Culture No results found for: LABBLOO and Urine Culture    Lab Results   Component Value Date    LABURIN ESCHERICHIA COLI  50,000 - 99,999 cfu/ml   (A) 11/10/2021       Pending Diagnostic Studies:     Procedure Component Value Units Date/Time    EKG 12-lead [[id number]] Collected: 08/30/22 0413    Order Status: Sent Lab Status: In process Updated: 08/30/22 0518    Narrative:      Test Reason : R07.9,    Vent. Rate : 060 BPM     Atrial Rate : 079 BPM     P-R Int : 000 ms          QRS Dur : 102 ms      QT Int : 408 ms       P-R-T Axes : 000 030 -10 degrees     QTc Int : 408 ms    Atrial fibrillation  Incomplete right bundle branch block  Abnormal ECG  When compared with ECG of 26-AUG-2022 13:09,  No significant change was found    Referred By: ITZ   SELF           Confirmed By:     EKG 12-lead [[id number]] Collected: 08/30/22  0413    Order Status: Sent Lab Status: In process Updated: 08/30/22 0459    Narrative:      Test Reason : R07.9,    Vent. Rate : 063 BPM     Atrial Rate : 208 BPM     P-R Int : 000 ms          QRS Dur : 102 ms      QT Int : 416 ms       P-R-T Axes : 000 027 -01 degrees     QTc Int : 425 ms    Atrial fibrillation  Incomplete right bundle branch block  Abnormal ECG  When compared with ECG of 30-AUG-2022 04:13,  No significant change was found    Referred By: AAAREFSANDOVAL   SELF           Confirmed By:          Medications:  Reconciled Home Medications:      Medication List      CONTINUE taking these medications    alendronate 70 MG tablet  Commonly known as: FOSAMAX  Take 1 tablet (70 mg total) by mouth every 7 days.     amLODIPine 2.5 MG tablet  Commonly known as: NORVASC  Take 1 tablet (2.5 mg total) by mouth once daily.     apixaban 2.5 mg Tab  Commonly known as: ELIQUIS  Take 2.5 mg by mouth 2 (two) times daily.     aspirin 81 MG EC tablet  Commonly known as: ECOTRIN  Take 81 mg by mouth once daily.     atenoloL 50 MG tablet  Commonly known as: TENORMIN  TAKE ONE TABLET BY MOUTH TWO TIMES A DAY     azelastine 137 mcg (0.1 %) nasal spray  Commonly known as: ASTELIN  1 spray (137 mcg total) by Nasal route 2 (two) times daily as needed for Rhinitis.     cephALEXin 500 MG capsule  Commonly known as: KEFLEX  Take 1 capsule (500 mg total) by mouth every 12 (twelve) hours. for 5 days     estradioL 0.01 % (0.1 mg/gram) vaginal cream  Commonly known as: ESTRACE  Place 2 g vaginally twice a week. Place into vagina daily for two weeks and then spread out to twice a week.     fluticasone propionate 50 mcg/actuation nasal spray  Commonly known as: FLONASE  INSTILL 2 SPRAYS IN EACH NOSTRIL ONCE A DAY     ketoconazole 2 % shampoo  Commonly known as: NIZORAL  Apply topically 3 (three) times a week.     levothyroxine 112 MCG tablet  Commonly known as: SYNTHROID  Take 0.5 tablets (56 mcg total) by mouth once daily.     lisinopriL 40  MG tablet  Commonly known as: PRINIVIL,ZESTRIL  TAKE ONE TABLET BY MOUTH EVERY DAY     mometasone 0.1 % solution  Commonly known as: ELOCON  SMARTSIG:Sparingly Topical Twice Daily     multivitamin per tablet  Commonly known as: THERAGRAN  Take 1 tablet by mouth daily as needed.     OCUVITE ORAL  Take 1 tablet by mouth once daily.     omeprazole 20 MG capsule  Commonly known as: PRILOSEC  TAKE ONE CAPSULE BY MOUTH ONCE A DAY     simvastatin 20 MG tablet  Commonly known as: ZOCOR  Take 1 tablet (20 mg total) by mouth every evening.     triamcinolone acetonide 0.025% 0.025 % cream  Commonly known as: KENALOG  Apply topically 2 (two) times daily.     vitamin E 400 UNIT capsule  Take 400 Units by mouth once daily.        STOP taking these medications    azithromycin 250 MG tablet  Commonly known as: Z-NIMCO     clobetasoL 0.05 % external solution  Commonly known as: TEMOVATE     clobetasoL 0.05 % shampoo  Commonly known as: CLOBEX     TYLENOL 8 HOUR ORAL        ASK your doctor about these medications    levocetirizine 5 MG tablet  Commonly known as: XYZAL  Take 1 tablet (5 mg total) by mouth every evening.            Indwelling Lines/Drains at time of discharge:   Lines/Drains/Airways     Drain  Duration           Female External Urinary Catheter 08/29/22 1513 <1 day                Time spent on the discharge of patient: 27 minutes         Jayden York MD  Department of Hospital Medicine  Formerly [facility]

## 2022-08-30 NOTE — HOSPITAL COURSE
8/30/2022  Ms Lang is alert awake, oriented X 3 and strongly desires to go home. She lives with a daughter and is willing to work with home health  ROS: weakness intermittent otherwise negative X 9  PE in no distress HEENT MODESTO EOM intact moist mucus membranes Neck supple no use of accessory muscles Lungs no crackles wheezes or rhonchi Heart S 1 S 2 RRR no murmur Abdomen  BS+ nontender Ext without CC or E pulses 1-2+ Skin no acute finding Neuro no acute sensory or motor deficit

## 2022-08-30 NOTE — PLAN OF CARE
08/30/22 1354   ROBLES Message   Medicare Outpatient and Observation Notification regarding financial responsibility Given to patient/caregiver;Explained to patient/caregiver;Signed/date by patient/caregiver   Date ROBLES was signed 08/30/22   Time ROBLES was signed 1035

## 2022-08-30 NOTE — PLAN OF CARE
Called by tele reporting 2 second pauses overnight, pt asymptomatic and vital signs otherwise stable. Getting formal EKG. Hold atenolol. Consult cardiology in the am. Monitor closely

## 2022-08-30 NOTE — PLAN OF CARE
[facility]  Initial Discharge Assessment       Primary Care Provider: Dale Fulton MD    Admission Diagnosis: Hyponatremia [E87.1]    Admission Date: 8/29/2022  Expected Discharge Date: 8/30/2022    Discharge Barriers Identified: None    Initial assessment done at bedside with Pt and daughter. Pt currently lives with Bertrand Valdivia daughter [phone] who will provide transportation home when medically ready for discharge.     Payor: MEDICARE / Plan: MEDICARE PART A & B / Product Type: Government /     Extended Emergency Contact Information  Primary Emergency Contact: Bertrand Valdivia  Address: UNKNOWN           [place] [address] [place]  Mobile Phone: [phone]  Relation: Daughter  Preferred language: English   needed? No  Secondary Emergency Contact: LangEmilee  Address: [address] [place] [address] [place]  Mobile Phone: [phone]  Relation: Daughter  Preferred language: English   needed? No    Discharge Plan A: Home with family  Discharge Plan B: Home with family      [facility] [address] [address]  [id number]  [place] [address]  Phone: [phone] Fax: [phone]      Initial Assessment (most recent)       Adult Discharge Assessment - 08/30/22 1030          Discharge Assessment    Assessment Type Discharge Planning Assessment     Confirmed/corrected address, phone number and insurance Yes     Confirmed Demographics Correct on Facesheet     Source of Information patient;family     When was your last doctors appointment? --   Unknown march    Does patient/caregiver understand observation status Yes     Communicated ROSENDA with patient/caregiver Yes     Reason For Admission Hyponatremia     Lives With child(mumtaz), adult     Facility Arrived From: Home     Do you expect to return to your current living situation? Yes     Do you have help at home or someone to help you  manage your care at home? Yes     Who are your caregiver(s) and their phone number(s)? Bertrand Valdivia daughter [phone]     Prior to hospitilization cognitive status: No Deficits     Current cognitive status: No Deficits     Walking or Climbing Stairs Difficulty ambulation difficulty, requires equipment     Mobility Management cane     Dressing/Bathing Difficulty none     Equipment Currently Used at Home cane, quad     Readmission within 30 days? No     Patient currently being followed by outpatient case management? No     Do you currently have service(s) that help you manage your care at home? No     Do you take prescription medications? Yes     Do you have prescription coverage? Yes     Coverage Medicaid     Do you have any problems affording any of your prescribed medications? No     Is the patient taking medications as prescribed? yes     Who is going to help you get home at discharge? Bertrand Valdivia daughter [phone]     How do you get to doctors appointments? family or friend will provide     Are you on dialysis? No     Do you take coumadin? No     Discharge Plan A Home with family     Discharge Plan B Home with family     DME Needed Upon Discharge  none     Discharge Plan discussed with: Patient     Discharge Barriers Identified None        Physical Activity    On average, how many days per week do you engage in moderate to strenuous exercise (like a brisk walk)? 0 days     On average, how many minutes do you engage in exercise at this level? 10 min        Financial Resource Strain    How hard is it for you to pay for the very basics like food, housing, medical care, and heating? Not hard at all        Housing Stability    In the last 12 months, was there a time when you were not able to pay the mortgage or rent on time? No     In the last 12 months, how many places have you lived? 1     In the last 12 months, was there a time when you did not have a steady place to sleep or slept in a shelter (including  now)? No        Transportation Needs    In the past 12 months, has lack of transportation kept you from medical appointments or from getting medications? No     In the past 12 months, has lack of transportation kept you from meetings, work, or from getting things needed for daily living? No        Food Insecurity    Within the past 12 months, you worried that your food would run out before you got the money to buy more. Never true     Within the past 12 months, the food you bought just didn't last and you didn't have money to get more. Never true        Stress    Do you feel stress - tense, restless, nervous, or anxious, or unable to sleep at night because your mind is troubled all the time - these days? Only a little        Social Connections    In a typical week, how many times do you talk on the phone with family, friends, or neighbors? Twice a week     How often do you get together with friends or relatives? More than three times a week     How often do you attend Sabianism or Orthodoxy services? More than 4 times per year     Do you belong to any clubs or organizations such as Sabianism groups, unions, fraternal or athletic groups, or school groups? No     How often do you attend meetings of the clubs or organizations you belong to? Never     Are you , , , , never , or living with a partner?         Alcohol Use    Q1: How often do you have a drink containing alcohol? Never     Q2: How many drinks containing alcohol do you have on a typical day when you are drinking? Patient does not drink     Q3: How often do you have six or more drinks on one occasion? Never        Relationship/Environment    Name(s) of Who Lives With Patient Bertrand Valdivia daughter [phone]

## 2022-08-30 NOTE — CONSULTS
[facility]  Department of Cardiology  Consult Note      PATIENT NAME: Ama Lang  MRN: [id number]  TODAY'S DATE: 08/30/2022  ADMIT DATE: 8/29/2022                          CONSULT REQUESTED BY: Jayden York MD    SUBJECTIVE     PRINCIPAL PROBLEM: Hyponatremia      REASON FOR CONSULT  2 second pause overnight;      HPI:  Pt is 89 yr old  female admitted for hyponatremia w/ PMH HTN, hypothyroidism, hyperlipidemia, atrial fib on ASA, Eliquis, atenolol; sustained 2 second pause overnight on telemetry; pt denies experiencing any symptoms overnight; does report some mild intermittent nausea but no chest pain, abdominal pain, dizziness; denies any recent falls  Hyponatremia has improved as of this AM labs;       Review of patient's allergies indicates:   Allergen Reactions    Montelukast Other (See Comments)     Depleted sodium     Ciprofloxacin     Hydrochlorothiazide Other (See Comments)     Low sodium       Past Medical History:   Diagnosis Date    *Atrial fibrillation     Dr. Mullen    Cancer     skin cancer arms     Cataract     Diverticulitis     Hyperlipidemia     Hypertension     Hypothyroidism      Past Surgical History:   Procedure Laterality Date    COLONOSCOPY  12/18/2003    Dr. Santoyo    CYSTOSCOPY  2008    Dr. Nguyen    CYSTOSCOPY N/A 1/20/2021    Procedure: CYSTOSCOPY;  Surgeon: Khushbu Robbins MD;  Location: [facility];  Service: Urology;  Laterality: N/A;    DILATION AND CURETTAGE OF UTERUS      EYE SURGERY      skin cancer bilateral arms  08/2019    Dr Soler skin cancer both arms     TUBAL LIGATION       Social History     Tobacco Use    Smoking status: Never    Smokeless tobacco: Never   Substance Use Topics    Alcohol use: No    Drug use: No        REVIEW OF SYSTEMS  CONSTITUTIONAL: Negative for chills, fatigue and fever.   EYES: No double vision, No blurred vision  NEURO: No headaches, No dizziness  RESPIRATORY: Negative for cough, shortness of breath and wheezing.     CARDIOVASCULAR: Negative for chest pain. Negative for palpitations and leg swelling; denies dizziness, feeling faint, lightheadedness  GI: +mild occasional nausea; good appetite; Negative for abdominal pain, No melena, diarrhea, vomiting.   : Negative for dysuria and frequency, Negative for hematuria;  SKIN: positive for bruising d/t eliquis/aspirin, Negative for edema or discoloration noted.   ENDOCRINE: Negative for polyphagia, Negative for heat intolerance, Negative for cold intolerance  PSYCHIATRIC: Negative for depression, Negative for anxiety, Negative for memory loss  MUSCULOSKELETAL: Negative for neck pain, Negative for muscle weakness, Negative for back pain     OBJECTIVE     VITAL SIGNS (Most Recent)  Temp: 97.7 °F (36.5 °C) (08/30/22 0700)  Pulse: 63 (08/30/22 0700)  Resp: 16 (08/30/22 0700)  BP: (!) 148/77 (08/30/22 0700)  SpO2: 95 % (08/30/22 0700)    VENTILATION STATUS  Resp: 16 (08/30/22 0700)  SpO2: 95 % (08/30/22 0700)       I & O (Last 24H):  Intake/Output Summary (Last 24 hours) at 8/30/2022 1247  Last data filed at 8/30/2022 0300  Gross per 24 hour   Intake 550 ml   Output 1300 ml   Net -750 ml       WEIGHTS  Wt Readings from Last 3 Encounters:   08/29/22 1011 60.8 kg (134 lb)   08/26/22 1257 60.8 kg (134 lb)   11/10/21 1040 63.7 kg (140 lb 6.9 oz)       PHYSICAL EXAM  GENERAL: small framed, well nourished, well-developed in no apparent distress alert and oriented.   HEENT: Hard of hearing; Normocephalic. Pupils normal and conjunctivae normal.  Mucous membranes normal, no cyanosis or icterus, trachea central,no pallor or icterus is noted..   NECK: No JVD. No bruit..   THYROID: Thyroid not enlarged. No nodules present..   CARDIAC: Irregular rate and rhythm, BBB, controlled rate; no ectopy. No gallops, clicks or murmurs noted at this time; no peripheral edema  CHEST ANATOMY: normal.   LUNGS: Clear to auscultation. No wheezing or rhonchi..   ABDOMEN: Soft no masses or organomegaly.  No abdomen  pulsations or bruits.  Normal bowel sounds. No pulsations and no masses felt, No guarding or rebound.   URINARY: No urbano catheter; purwick bag  EXTREMITIES: No cyanosis, clubbing or edema noted at this time., no calf tenderness bilaterally.   PERIPHERAL VASCULAR SYSTEM: Good palpable distal pulses.   CENTRAL NERVOUS SYSTEM: No focal motor or sensory deficits noted.   SKIN: Skin without lesions, moist, well perfused.   MUSCLE STRENGTH & TONE: No noteable weakness, atrophy or abnormal movement.     HOME MEDICATIONS:  No current facility-administered medications on file prior to encounter.     Current Outpatient Medications on File Prior to Encounter   Medication Sig Dispense Refill    amLODIPine (NORVASC) 2.5 MG tablet Take 1 tablet (2.5 mg total) by mouth once daily. 90 tablet 3    apixaban (ELIQUIS) 2.5 mg Tab Take 2.5 mg by mouth 2 (two) times daily.       atenoloL (TENORMIN) 50 MG tablet TAKE ONE TABLET BY MOUTH TWO TIMES A  tablet 1    cephALEXin (KEFLEX) 500 MG capsule Take 1 capsule (500 mg total) by mouth every 12 (twelve) hours. for 5 days 10 capsule 0    levothyroxine (SYNTHROID) 112 MCG tablet Take 0.5 tablets (56 mcg total) by mouth once daily. 45 tablet 1    multivitamin (THERAGRAN) per tablet Take 1 tablet by mouth daily as needed.       simvastatin (ZOCOR) 20 MG tablet Take 1 tablet (20 mg total) by mouth every evening. 90 tablet 2    triamcinolone acetonide 0.025% (KENALOG) 0.025 % cream Apply topically 2 (two) times daily.      VIT C/VIT E/LUTEIN/MIN/OMEGA-3 (OCUVITE ORAL) Take 1 tablet by mouth once daily.      vitamin E 400 UNIT capsule Take 400 Units by mouth once daily.      ACETAMINOPHEN (TYLENOL 8 HOUR ORAL) Take 2 capsules by mouth daily as needed.      alendronate (FOSAMAX) 70 MG tablet Take 1 tablet (70 mg total) by mouth every 7 days. 12 tablet 3    aspirin (ECOTRIN) 81 MG EC tablet Take 81 mg by mouth once daily.      azelastine (ASTELIN) 137 mcg (0.1 %) nasal spray 1 spray (137 mcg  total) by Nasal route 2 (two) times daily as needed for Rhinitis. 30 mL 11    azithromycin (Z-NIMCO) 250 MG tablet Take by mouth.      clobetasoL (CLOBEX) 0.05 % shampoo       clobetasoL (TEMOVATE) 0.05 % external solution       estradioL (ESTRACE) 0.01 % (0.1 mg/gram) vaginal cream Place 2 g vaginally twice a week. Place into vagina daily for two weeks and then spread out to twice a week. 16 g 11    fluticasone propionate (FLONASE) 50 mcg/actuation nasal spray INSTILL 2 SPRAYS IN EACH NOSTRIL ONCE A DAY 48 g 1    ketoconazole (NIZORAL) 2 % shampoo Apply topically 3 (three) times a week.  5    levocetirizine (XYZAL) 5 MG tablet Take 1 tablet (5 mg total) by mouth every evening. (Patient taking differently: Take 5 mg by mouth once daily.) 30 tablet 11    lisinopriL (PRINIVIL,ZESTRIL) 40 MG tablet TAKE ONE TABLET BY MOUTH EVERY DAY 90 tablet 2    mometasone (ELOCON) 0.1 % solution SMARTSIG:Sparingly Topical Twice Daily      omeprazole (PRILOSEC) 20 MG capsule TAKE ONE CAPSULE BY MOUTH ONCE A DAY 90 capsule 3       SCHEDULED MEDS:   amLODIPine  2.5 mg Oral Daily    aspirin  81 mg Oral Daily    atorvastatin  20 mg Oral QHS    cephALEXin  500 mg Oral Q12H    cetirizine  5 mg Oral QHS    fluticasone propionate  2 spray Each Nostril Daily    levothyroxine  112 mcg Oral Before breakfast    lisinopriL  40 mg Oral Daily    multivitamin  1 tablet Oral Daily    pantoprazole  40 mg Oral Before breakfast       CONTINUOUS INFUSIONS:    PRN MEDS:acetaminophen, albuterol-ipratropium, azelastine, calcium chloride IVPB, calcium chloride IVPB, calcium chloride IVPB, dextrose 50%, dextrose 50%, glucagon (human recombinant), glucose, glucose, HYDROcodone-acetaminophen, magnesium oxide, magnesium sulfate IVPB, magnesium sulfate IVPB, magnesium sulfate IVPB, magnesium sulfate IVPB, melatonin, morphine, naloxone, ondansetron, polyethylene glycol, potassium chloride in water, potassium chloride in water, potassium chloride in water, potassium  chloride in water, potassium chloride, potassium chloride, potassium chloride, potassium chloride, senna-docusate 8.6-50 mg, simethicone, sodium chloride 0.9%, sodium phosphate IVPB, sodium phosphate IVPB, sodium phosphate IVPB, sodium phosphate IVPB, sodium phosphate IVPB    LABS AND DIAGNOSTICS     CBC LAST 3 DAYS  Recent Labs   Lab 08/26/22  1341 08/29/22  1138 08/30/22  0539   WBC 3.17* 4.58 2.83*   RBC 3.27* 3.47* 3.23*   HGB 11.8* 12.3 11.7*   HCT 34.2* 36.2* 33.7*   * 104* 104*   MCH 36.1* 35.4* 36.2*   MCHC 34.5 34.0 34.7   RDW 13.1 13.2 12.9   * 159 132*   MPV 13.6* 13.9* 13.8*   GRAN 44.7  1.4* 58.5  2.6 45.9  1.3*   LYMPH 36.4  1.1 27.0  1.2 35.0  1.0   MONO 14.1  0.4 10.3  0.5 13.1  0.4   BASO 0.05 0.06 0.06   NRBC 0 0 0       COAGULATION LAST 3 DAYS  No results for input(s): LABPT, INR, APTT in the last 168 hours.    CHEMISTRY LAST 3 DAYS  Recent Labs   Lab 08/26/22  1341 08/29/22  1138 08/29/22  1630 08/29/22 2002 08/30/22  0001 08/30/22  0539   * 125*  125*   < > 129* 126* 131*   K 4.9 4.6   < > 4.5 4.5 5.0   CL 94* 90*   < > 90* 92* 94*   CO2 26 29   < > 30* 29 31*   ANIONGAP 8 6*   < > 9 5* 6*   BUN 10 12   < > 12 13 11   CREATININE 1.0 0.8   < > 0.6 0.6 0.6   * 110   < > 136* 117* 108   CALCIUM 11.0* 10.7*   < > 10.4 9.9 10.2   MG  --  2.1  --   --   --  2.1   ALBUMIN 4.2 4.4  --   --   --   --    PROT 6.9 7.3  --   --   --   --    ALKPHOS 90 86  --   --   --   --    ALT 19 23  --   --   --   --    AST 31 35  --   --   --   --    BILITOT 0.8 1.1*  --   --   --   --     < > = values in this interval not displayed.       CARDIAC PROFILE LAST 3 DAYS  Recent Labs   Lab 08/26/22  1341   *   TROPONINI <0.030       ENDOCRINE LAST 3 DAYS  No results for input(s): TSH, PROCAL in the last 168 hours.    LAST ARTERIAL BLOOD GAS  ABG  No results for input(s): PH, PO2, PCO2, HCO3, BE in the last 168 hours.    LAST 7 DAYS MICROBIOLOGY   Microbiology Results (last 7  days)       ** No results found for the last 168 hours. **            MOST RECENT IMAGING  CT Head Without Contrast  CMS MANDATED QUALITY DATA - CT RADIATION - 436    All CT scans at this facility utilize dose modulation, iterative reconstruction, and/or weight based dosing when appropriate to reduce radiation dose to as low as reasonably achievable.    Reason: closed head injury    TECHNIQUE: Head CT without IV contrast.    COMPARISON: None    FINDINGS:    Gray-white differentiation is maintained without hemorrhage, midline shift, or mass effect. Periventricular and subcortical white matter low-attenuation bilaterally suggest chronic small vessel ischemic changes. Diffuse cerebral and cerebellar atrophy is evident.    The ventricles and cisterns are maintained.    Calvarium is intact. Visualized sinuses are clear.    IMPRESSION:    1. No acute intracranial abnormality.  2. Chronic and involutional changes of the brain.    Electronically signed by:  Kameron Jaramillo DO  8/26/2022 7:43 PM CDT Workstation: [id number]  CT Abdomen Pelvis  Without Contrast  CMS MANDATED QUALITY DATA - CT RADIATION - 436    All CT scans at this facility utilize dose modulation, iterative reconstruction, and/or weight based dosing when appropriate to reduce radiation dose to as low as reasonably achievable.    Reason: Abdominal mass, intra-abdominal neoplasm suspected Shortness of Breath (Sob, fatigue, UTI symptoms    TECHNIQUE: CT abdomen and pelvis without IV or oral contrast. Study is tailored for detection of urinary tract calculi and evaluation of solid organs, hollow viscera, and vascular structures is limited.    COMPARISON: None    CT ABDOMEN:  Partially visualized heart is enlarged with coronary artery calcification. Minimal subpleural medial left basilar opacities suggest atelectasis or scarring. Visualized lung bases otherwise clear.    Liver parenchyma is diffusely hyperdense, with noncontrast liver otherwise unremarkable.  Noncontrast gallbladder, pancreas, spleen, and adrenals are normal. Noncontrast right kidney is normal. Moderate diffuse left renal cortical atrophy is multifocal, with decreased size of left kidney.    Mild aortoiliac calcifications are present.    Colonic diverticula are diffuse. Large and small intestine show no additional abnormality. Appendix not identified, but no secondary signs of appendicitis are present. No free intraperitoneal gas.    L3 superior endplate compression fracture has acute cortical fracture planes but no surrounding paraspinous hematoma. Degenerative disc disease occurs at L1-L2 and L2-L3. Additional lumbar spine degenerative changes are present.    CT PELVIS:  Right spigelian hernia contains fat and segment of small intestines without obstruction. Tiny fat-containing left inguinal hernia is evident.    Bladder is normal. No free pelvic fluid. Uterus appears unremarkable. Left adnexal cyst measures 4.2 x 4.7 cm. No acute osseous abnormality throughout the pelvis.    IMPRESSION:    1. 4.2 x 4.7 cm left adnexal cystic lesion of undetermined etiology. Due to size, further evaluation with pelvic ultrasound, which can be performed as an outpatient, is recommended.  2. Cardiomegaly and coronary artery calcification.  3. Hyperdense liver parenchyma can be seen as sequelae of past congestion from right heart failure, hemachromatosis, or chronic amiodarone therapy.  4. Left renal atrophy.  5. Diverticulosis.  6. L3 compression fracture appears acute. Correlation for symptoms at this site is requested.  7. Right spigelian hernia.    Electronically signed by:  Clark Rosenberg MD  8/26/2022 4:51 PM CDT Workstation: [id number]  X-Ray Chest AP Portable  XR CHEST 1 VIEW    CLINICAL HISTORY:  89 years Female CHF    COMPARISON: 12/5/2017    FINDINGS: Cardiomediastinal silhouette is within normal limits. Lungs are normally expanded with no airspace consolidation. No pleural effusion or pneumothorax. No acute  osseous abnormality.    IMPRESSION: No acute pulmonary process.    Electronically signed by:  Demetra Shahid MD  8/26/2022 2:40 PM CDT Workstation: [id number]      ECHOCARDIOGRAM RESULTS (last 5)  No results found for this or any previous visit.      CURRENT/PREVIOUS VISIT EKG  Results for orders placed or performed during the hospital encounter of 08/29/22   EKG 12-lead    Collection Time: 08/30/22  4:13 AM    Narrative    Test Reason : R07.9,    Vent. Rate : 063 BPM     Atrial Rate : 208 BPM     P-R Int : 000 ms          QRS Dur : 102 ms      QT Int : 416 ms       P-R-T Axes : 000 027 -01 degrees     QTc Int : 425 ms    Atrial fibrillation  Incomplete right bundle branch block  Abnormal ECG  When compared with ECG of 30-AUG-2022 04:13,  No significant change was found    Referred By: ITZ   SELF           Confirmed By:            ASSESSMENT/PLAN:     Active Hospital Problems    Diagnosis    *Hyponatremia    Acid indigestion    Essential hypertension    Hypothyroidism    Hyperlipidemia       ASSESSMENT & PLAN:   Atrial fibrillation w/ 2 sec pause  Hyponatremia- resolving  Essential HTN  Hypothyroidism  Hyperlipidemia      RECOMMENDATIONS:  Afib w/ 2 sec pause - Continue Apixaban and ASA; Discontinue beta blocker (atenolol); currently no BB since overnight 2 second pause; HR 60-70s currently; Advised follow up with  on outpatient basis; advised holter monitor output to monitor for RVR and any re-initiation of atenolol  Hyponatremia- resolving; 1 liter NA restriction in place; continue chem profiles and management per hospitalist  Essential HTN- lisinopril, amlodipine; BP controlled avoid clonidine, non dihydropyridine calcium blockers and other beta-blockers   Hypothyroidism- on levothyroxine  Hyperlipidemia- on atorvastatin, consider re-evaluating the value of this long-term management      Candi Sena NP  [facility]  Department of Cardiology  Date of Service: 08/30/2022         I have personally interviewed and examined the patient, I have reviewed the Nurse Practitioner's history and physical, assessment, and plan. I agree with the findings and plan.      Papo Mayer M.D.  [facility]  Department of Cardiology  Date of Service: 08/30/2022  12:47 PM

## 2022-08-30 NOTE — PLAN OF CARE
08/30/22 1353   Final Note   Assessment Type Final Discharge Note   Anticipated Discharge Disposition Home   What phone number can be called within the next 1-3 days to see how you are doing after discharge? [phone]   Hospital Resources/Appts/Education Provided Provided patient/caregiver with written discharge plan information;Appointments scheduled and added to AVS   Post-Acute Status   Coverage Medicare Part A&B   Discharge Delays None known at this time       Orders reviewed. Primary care office will contact Pt will follow up appointment with Dr Dale Fulton of [facility]. No further needs. Pt clear for discharge by case management.

## 2022-09-01 NOTE — PLAN OF CARE
Home health orders noted on chart review.  Called patient's daughter Bertrand at number on face sheet to discuss home health options and she verbalizes agreement and understanding.  Preference given to [facility] agency, patient choice form signed and witnessed on her behalf with her permission and scanned into .  Referral sent to [facility] via [facility] and liaison Zhanna notified.  Per Zhanna, patient will admit to their services tomorrow, 9/2/22.        09/01/22 1146   Post-Acute Status   Post-Acute Authorization Home Health   Home Health Status Set-up Complete/Auth obtained   Discharge Delays None known at this time   Discharge Plan   Discharge Plan A Home Health   Discharge Plan B Home Health

## 2022-09-02 ENCOUNTER — TELEPHONE (OUTPATIENT)
Dept: FAMILY MEDICINE | Facility: CLINIC | Age: 87
End: 2022-09-02
Payer: MEDICARE

## 2022-09-02 PROCEDURE — G0180 MD CERTIFICATION HHA PATIENT: HCPCS | Mod: ,,, | Performed by: FAMILY MEDICINE

## 2022-09-02 PROCEDURE — G0180 PR HOME HEALTH MD CERTIFICATION: ICD-10-PCS | Mod: ,,, | Performed by: FAMILY MEDICINE

## 2022-09-02 NOTE — TELEPHONE ENCOUNTER
Spoke to nurse- patient has generalized pain and was not able to say what specific pain she was having. Daughter asked nurse to call to see if she can give her Tylenol Approval given and nurse will let family know.

## 2022-09-02 NOTE — TELEPHONE ENCOUNTER
----- Message from Aarti Alvarez sent at 9/2/2022 12:00 PM CDT -----  Type: Patient Call Back         Who called: Pt Home Health Nurse Summerlin Holzhalb         What is the request in detail: regarding  the pt being in a lot of pain and wanting to know if Pt can take tylenol ?         Can the clinic reply by [facility]?no          Would the patient rather a call back or a response via [facility]?call back          Best call back number:[phone]         Additional Information:           Thank You

## 2022-09-06 ENCOUNTER — OFFICE VISIT (OUTPATIENT)
Dept: FAMILY MEDICINE | Facility: CLINIC | Age: 87
End: 2022-09-06
Attending: FAMILY MEDICINE
Payer: MEDICARE

## 2022-09-06 ENCOUNTER — LAB VISIT (OUTPATIENT)
Dept: LAB | Facility: HOSPITAL | Age: 87
End: 2022-09-06
Attending: FAMILY MEDICINE
Payer: MEDICARE

## 2022-09-06 VITALS
SYSTOLIC BLOOD PRESSURE: 130 MMHG | WEIGHT: 126.75 LBS | TEMPERATURE: 98 F | DIASTOLIC BLOOD PRESSURE: 86 MMHG | RESPIRATION RATE: 18 BRPM | BODY MASS INDEX: 21.64 KG/M2 | HEART RATE: 96 BPM | HEIGHT: 64 IN | OXYGEN SATURATION: 96 %

## 2022-09-06 DIAGNOSIS — N26.1 LEFT RENAL ATROPHY: ICD-10-CM

## 2022-09-06 DIAGNOSIS — E83.52 HYPERCALCEMIA: ICD-10-CM

## 2022-09-06 DIAGNOSIS — E87.1 HYPONATREMIA: Primary | ICD-10-CM

## 2022-09-06 DIAGNOSIS — M81.0 AGE-RELATED OSTEOPOROSIS WITHOUT CURRENT PATHOLOGICAL FRACTURE: ICD-10-CM

## 2022-09-06 DIAGNOSIS — R19.00 PELVIC MASS IN FEMALE: ICD-10-CM

## 2022-09-06 DIAGNOSIS — E87.79: ICD-10-CM

## 2022-09-06 DIAGNOSIS — E21.0 PRIMARY HYPERPARATHYROIDISM: ICD-10-CM

## 2022-09-06 DIAGNOSIS — I10 ESSENTIAL HYPERTENSION: ICD-10-CM

## 2022-09-06 DIAGNOSIS — I48.20 CHRONIC ATRIAL FIBRILLATION: ICD-10-CM

## 2022-09-06 DIAGNOSIS — E87.1 HYPONATREMIA: ICD-10-CM

## 2022-09-06 LAB
ANION GAP SERPL CALC-SCNC: 9 MMOL/L (ref 8–16)
BUN SERPL-MCNC: 11 MG/DL (ref 10–30)
CALCIUM SERPL-MCNC: 11 MG/DL (ref 8.7–10.5)
CHLORIDE SERPL-SCNC: 98 MMOL/L (ref 95–110)
CO2 SERPL-SCNC: 26 MMOL/L (ref 23–29)
CREAT SERPL-MCNC: 0.8 MG/DL (ref 0.5–1.4)
EST. GFR  (NO RACE VARIABLE): >60 ML/MIN/1.73 M^2
GLUCOSE SERPL-MCNC: 104 MG/DL (ref 70–110)
POTASSIUM SERPL-SCNC: 5 MMOL/L (ref 3.5–5.1)
SODIUM SERPL-SCNC: 133 MMOL/L (ref 136–145)

## 2022-09-06 PROCEDURE — 99215 OFFICE O/P EST HI 40 MIN: CPT | Mod: PBBFAC,PN | Performed by: FAMILY MEDICINE

## 2022-09-06 PROCEDURE — 99215 PR OFFICE/OUTPT VISIT, EST, LEVL V, 40-54 MIN: ICD-10-PCS | Mod: S$PBB,,, | Performed by: FAMILY MEDICINE

## 2022-09-06 PROCEDURE — 99215 OFFICE O/P EST HI 40 MIN: CPT | Mod: S$PBB,,, | Performed by: FAMILY MEDICINE

## 2022-09-06 PROCEDURE — 99999 PR PBB SHADOW E&M-EST. PATIENT-LVL V: ICD-10-PCS | Mod: PBBFAC,,, | Performed by: FAMILY MEDICINE

## 2022-09-06 PROCEDURE — 36415 COLL VENOUS BLD VENIPUNCTURE: CPT | Performed by: FAMILY MEDICINE

## 2022-09-06 PROCEDURE — 99999 PR PBB SHADOW E&M-EST. PATIENT-LVL V: CPT | Mod: PBBFAC,,, | Performed by: FAMILY MEDICINE

## 2022-09-06 PROCEDURE — 80048 BASIC METABOLIC PNL TOTAL CA: CPT | Performed by: FAMILY MEDICINE

## 2022-09-06 RX ORDER — ALENDRONATE SODIUM 70 MG/1
70 TABLET ORAL
Qty: 12 TABLET | Refills: 3 | Status: SHIPPED | OUTPATIENT
Start: 2022-09-06 | End: 2023-10-31

## 2022-09-06 NOTE — PROGRESS NOTES
Subjective:       Patient ID: Ama Lang is a 89 y.o. female.    Chief Complaint: Hospital Follow Up (Pt states that she was in the hospital for sodium depletion) and Urinary Frequency (Pt states that she is urinating frequently, pt states that it aches when she has to urinates )    89-year-old female coming in as a follow-up from two hospital visits, the 1st occurred August 26 when she went to the emergency room with nausea and fatigue.  She was having urinary tract symptoms as well and was found to have a low sodium of 128.  Her calcium was elevated at 11.0 and she has a known history of hyperparathyroidism.  Her urinalysis was discolored orange and could not be read but a microscopic exam showed no evidence of UTI and a culture was not done.  The patient tells me today that she was taking some leftover medication from the past that was probably Pyridium for her urinary tract symptoms.  As part of her workup the patient had an abdomen and pelvic CT scan which showed a 4.2 x 4.7 cm left adnexal cystic mass with recommendation of a pelvic ultrasound to be done as an outpatient for further evaluation.  Hospital Medicine was consulted but did not feel that the patient required admission and she was discharged for outpatient follow-up.  She returned three days later on August 29th continuing to experience fatigue and other symptoms and her sodium at that time had dropped to 125 with a calcium of 10.7.  On this occasion she was admitted overnight and put on fluid restriction with improvement in her sodium to 131.  The patient tells me today that she felt that drinking water was good for her and she was essentially forcing water and urinating frequently.  Follow-up urinalysis on the 2nd event was still too discolored and microscopic exam was negative and again no culture was done.  The pelvic ultrasound was not done while she was in the hospital and the patient wish to go home the following day and was discharged.   Officially her medications were not changed with atenolol 50 mg twice daily but she and her daughter both say that she was told to reduce the atenolol to once a day due to bradycardia occurring in her sleep..  The patient states that when she went home she stopped taking the atenolol altogether.  She has been following her fluid restrictions of 1 L and two cups daily and she says she does feel better although not up to her usual strength.    As an aside, the patient indicates that she has not taken her alendronate for an unknown period of time as she ran out of it and has not been able to get a refill.  Renal function is noted to be steady with GFR greater than 60 and she does not have any GI upset with the medications so I will refill it for the time being at least.    On discharge in addition to a follow-up with me she was told to see Dr. Urena in Nephrology.    Past Medical History:  No date: *Atrial fibrillation      Comment:  Dr. Mullen  No date: Cancer      Comment:  skin cancer arms   No date: Cataract  No date: Diverticulitis  No date: Hyperlipidemia  No date: Hypertension  No date: Hypothyroidism    Past Surgical History:  12/18/2003: COLONOSCOPY      Comment:  Dr. Santoyo  2008: CYSTOSCOPY      Comment:  Dr. Nguyen  1/20/2021: CYSTOSCOPY; N/A      Comment:  Procedure: CYSTOSCOPY;  Surgeon: Khushbu Robbins MD;               Location: [facility];  Service: Urology;  Laterality: N/A;  No date: DILATION AND CURETTAGE OF UTERUS  No date: EYE SURGERY  08/2019: skin cancer bilateral arms      Comment:  Dr Soler skin cancer both arms   No date: TUBAL LIGATION    Current Outpatient Medications on File Prior to Visit:  amLODIPine (NORVASC) 2.5 MG tablet, Take 1 tablet (2.5 mg total) by mouth once daily., Disp: 90 tablet, Rfl: 3  apixaban (ELIQUIS) 2.5 mg Tab, Take 2.5 mg by mouth 2 (two) times daily. , Disp: , Rfl:   aspirin (ECOTRIN) 81 MG EC tablet, Take 81 mg by mouth once daily., Disp: , Rfl:   atenoloL  (TENORMIN) 50 MG tablet, TAKE ONE TABLET BY MOUTH TWO TIMES A DAY, Disp: 180 tablet, Rfl: 1  azelastine (ASTELIN) 137 mcg (0.1 %) nasal spray, 1 spray (137 mcg total) by Nasal route 2 (two) times daily as needed for Rhinitis., Disp: 30 mL, Rfl: 11  estradioL (ESTRACE) 0.01 % (0.1 mg/gram) vaginal cream, Place 2 g vaginally twice a week. Place into vagina daily for two weeks and then spread out to twice a week., Disp: 16 g, Rfl: 11  fluticasone propionate (FLONASE) 50 mcg/actuation nasal spray, INSTILL 2 SPRAYS IN EACH NOSTRIL ONCE A DAY, Disp: 48 g, Rfl: 1  levocetirizine (XYZAL) 5 MG tablet, Take 1 tablet (5 mg total) by mouth every evening. (Patient taking differently: Take 5 mg by mouth once daily.), Disp: 30 tablet, Rfl: 11  levothyroxine (SYNTHROID) 112 MCG tablet, Take 0.5 tablets (56 mcg total) by mouth once daily., Disp: 45 tablet, Rfl: 1  lisinopriL (PRINIVIL,ZESTRIL) 40 MG tablet, TAKE ONE TABLET BY MOUTH EVERY DAY, Disp: 90 tablet, Rfl: 2  multivitamin (THERAGRAN) per tablet, Take 1 tablet by mouth daily as needed. , Disp: , Rfl:   omeprazole (PRILOSEC) 20 MG capsule, TAKE ONE CAPSULE BY MOUTH ONCE A DAY, Disp: 90 capsule, Rfl: 3  simvastatin (ZOCOR) 20 MG tablet, Take 1 tablet (20 mg total) by mouth every evening., Disp: 90 tablet, Rfl: 2  triamcinolone acetonide 0.025% (KENALOG) 0.025 % cream, Apply topically 2 (two) times daily., Disp: , Rfl:   VIT C/VIT E/LUTEIN/MIN/OMEGA-3 (OCUVITE ORAL), Take 1 tablet by mouth once daily., Disp: , Rfl:   vitamin E 400 UNIT capsule, Take 400 Units by mouth once daily., Disp: , Rfl:   [DISCONTINUED] alendronate (FOSAMAX) 70 MG tablet, Take 1 tablet (70 mg total) by mouth every 7 days. (Patient not taking: Reported on 9/6/2022), Disp: 12 tablet, Rfl: 3  [DISCONTINUED] ketoconazole (NIZORAL) 2 % shampoo, Apply topically 3 (three) times a week., Disp: , Rfl: 5  [DISCONTINUED] mometasone (ELOCON) 0.1 % solution, SMARTSIG:Sparingly Topical Twice Daily, Disp: , Rfl:     No  current facility-administered medications on file prior to visit.        Review of Systems   Constitutional:  Positive for fatigue.   Respiratory:  Negative for chest tightness and shortness of breath.    Cardiovascular:  Negative for chest pain and palpitations.   Gastrointestinal:  Negative for abdominal pain, constipation, diarrhea, nausea and vomiting.   Genitourinary:  Positive for frequency. Negative for dysuria and hematuria.   Neurological:  Positive for weakness.     Objective:      Physical Exam  Constitutional:       General: She is not in acute distress.     Appearance: Normal appearance. She is normal weight. She is not ill-appearing, toxic-appearing or diaphoretic.   HENT:      Right Ear: Decreased hearing noted.      Left Ear: Decreased hearing noted.   Cardiovascular:      Rate and Rhythm: Normal rate. Rhythm irregular.      Heart sounds: Normal heart sounds. No murmur heard.    No friction rub. No gallop.   Pulmonary:      Effort: Pulmonary effort is normal. No respiratory distress.      Breath sounds: Normal breath sounds. No stridor. No wheezing, rhonchi or rales.   Abdominal:      General: Abdomen is flat. Bowel sounds are normal. There is no distension.      Palpations: There is no mass.      Tenderness: There is no abdominal tenderness. There is no guarding or rebound.      Hernia: No hernia is present.   Musculoskeletal:      Right lower leg: No edema (Trace only).      Left lower leg: No edema (Trace only).   Skin:     Coloration: Skin is not jaundiced or pale.      Findings: Bruising (A number of bruises on the upper extremities from IV sites and blood sticks) present. No erythema or rash.   Neurological:      Mental Status: She is alert.   Psychiatric:         Mood and Affect: Mood normal.         Behavior: Behavior normal.         Thought Content: Thought content normal.         Judgment: Judgment normal.       Assessment:       1. Hyponatremia    2. Water intoxication    3. Pelvic mass in  female    4. Left renal atrophy    5. Hypercalcemia    6. Chronic atrial fibrillation    7. Essential hypertension    8. Primary hyperparathyroidism    9. Age-related osteoporosis without current pathological fracture    10. BMI 21.0-21.9, adult          Plan:       1. Hyponatremia  Improving as of her last day of admission August 30, 2022.  We will check another BMP today  - Basic Metabolic Panel; Future    2. Water intoxication  Seems to be the likely cause of her hyponatremia.  Both lisinopril and Omeprazole have post marketing reports of hyponatremia with no listed frequency of occurrence and not likely to be related to the current situation    3. Pelvic mass in female  4.2 x 4.7 left adnexal cystic mass of uncertain etiology.  Potential for ovarian carcinoma.  Will get ultrasound as requested by Radiology  - US Pelvis Complete Non OB; Future    4. Left renal atrophy  Incidental finding on CT scan.  Last imaging of the left kidney was a retroperitoneal ultrasound done November 16, 2020 at which time it was noted to be normal.  Prior to that a suspected left renal mass could not be identified on subsequent imaging.  We have renal ultrasounds back to 2013 by Urology where she was being followed for chronic renal parenchymal disease bilaterally with notation of left renal cortical atrophy back to May 30, 2013.    5. Hypercalcemia  Lab Results   Component Value Date    CALCIUM 10.2 08/30/2022    PHOS 2.9 11/27/2018   Returned to normal with normalization sodium and chloride levels.  Patient with known history of hyperparathyroidism    - Basic Metabolic Panel; Future    6. Chronic atrial fibrillation  Stable with controlled rate.  Patient instructed to resume taking the one atenolol 50 mg in the morning    7. Essential hypertension  Controlled    8. Primary hyperparathyroidism  See above    9. Age-related osteoporosis without current pathological fracture  Refill alendronate  - alendronate (FOSAMAX) 70 MG tablet;  Take 1 tablet (70 mg total) by mouth every 7 days.  Dispense: 12 tablet; Refill: 3    10. BMI 21.0-21.9, adult  Good weight      I spent 63 minutes on this encounter.  This time includes face-to-face time, orders, chart review, test review, and documentation.

## 2022-09-07 ENCOUNTER — LAB VISIT (OUTPATIENT)
Dept: LAB | Facility: HOSPITAL | Age: 87
End: 2022-09-07
Attending: FAMILY MEDICINE
Payer: MEDICARE

## 2022-09-07 DIAGNOSIS — E87.1 HYPOSMOLALITY SYNDROME: ICD-10-CM

## 2022-09-07 DIAGNOSIS — I10 ESSENTIAL HYPERTENSION, MALIGNANT: Primary | ICD-10-CM

## 2022-09-07 LAB
ANION GAP SERPL CALC-SCNC: 14 MMOL/L (ref 8–16)
BUN SERPL-MCNC: 13 MG/DL (ref 10–30)
CALCIUM SERPL-MCNC: 10.5 MG/DL (ref 8.7–10.5)
CHLORIDE SERPL-SCNC: 100 MMOL/L (ref 95–110)
CO2 SERPL-SCNC: 17 MMOL/L (ref 23–29)
CREAT SERPL-MCNC: 0.7 MG/DL (ref 0.5–1.4)
EST. GFR  (NO RACE VARIABLE): >60 ML/MIN/1.73 M^2
GLUCOSE SERPL-MCNC: 83 MG/DL (ref 70–110)
POTASSIUM SERPL-SCNC: 4.8 MMOL/L (ref 3.5–5.1)
SODIUM SERPL-SCNC: 131 MMOL/L (ref 136–145)

## 2022-09-07 PROCEDURE — 80048 BASIC METABOLIC PNL TOTAL CA: CPT | Performed by: FAMILY MEDICINE

## 2022-09-12 ENCOUNTER — TELEPHONE (OUTPATIENT)
Dept: FAMILY MEDICINE | Facility: CLINIC | Age: 87
End: 2022-09-12
Payer: MEDICARE

## 2022-09-12 ENCOUNTER — EXTERNAL HOME HEALTH (OUTPATIENT)
Dept: HOME HEALTH SERVICES | Facility: HOSPITAL | Age: 87
End: 2022-09-12
Payer: MEDICARE

## 2022-09-12 NOTE — TELEPHONE ENCOUNTER
----- Message from Genoveva Petty MA sent at 9/9/2022  3:52 PM CDT -----  Spoke with Chaya at [facility] [phone].  The reason the BMP was drawn on 9/7/22 was on her admitted orders the hospitalist wrote check BMP weekly for 4 weeks.  Do you want to change the order?

## 2022-09-14 ENCOUNTER — LAB VISIT (OUTPATIENT)
Dept: LAB | Facility: HOSPITAL | Age: 87
End: 2022-09-14
Attending: FAMILY MEDICINE
Payer: MEDICARE

## 2022-09-14 DIAGNOSIS — E83.52 HYPERCALCEMIA: ICD-10-CM

## 2022-09-14 DIAGNOSIS — E87.1 HYPONATREMIA: ICD-10-CM

## 2022-09-14 DIAGNOSIS — E87.1 HYPOSMOLALITY SYNDROME: Primary | ICD-10-CM

## 2022-09-14 LAB
ANION GAP SERPL CALC-SCNC: 6 MMOL/L (ref 8–16)
BUN SERPL-MCNC: 12 MG/DL (ref 10–30)
CALCIUM SERPL-MCNC: 10.9 MG/DL (ref 8.7–10.5)
CHLORIDE SERPL-SCNC: 102 MMOL/L (ref 95–110)
CO2 SERPL-SCNC: 26 MMOL/L (ref 23–29)
CREAT SERPL-MCNC: 0.8 MG/DL (ref 0.5–1.4)
EST. GFR  (NO RACE VARIABLE): >60 ML/MIN/1.73 M^2
GLUCOSE SERPL-MCNC: 92 MG/DL (ref 70–110)
POTASSIUM SERPL-SCNC: 4.5 MMOL/L (ref 3.5–5.1)
SODIUM SERPL-SCNC: 134 MMOL/L (ref 136–145)

## 2022-09-14 PROCEDURE — 80048 BASIC METABOLIC PNL TOTAL CA: CPT | Performed by: FAMILY MEDICINE

## 2022-09-14 NOTE — MR AVS SNAPSHOT
[facility]  [address]  [place] [address]  Phone: [phone]                  Ama Lang   2017 2:45 PM   Office Visit    Description:  Female : 1928   Provider:  Arlene Calhoun MD   Department:  [facility]           Reason for Visit     Advice Only           Diagnoses this Visit        Comments    Recurrent UTI    -  Primary     Renal mass                To Do List           Future Appointments        Provider Department Dept Phone    2017 10:45 AM UROLOGY, NURSE [facility] [phone]      Goals (5 Years of Data)     None      [facility] On Call     [facility] On Call Nurse Care Line -  Assistance  Registered nurses in the [facility] On Call Center provide clinical advisement, health education, appointment booking, and other advisory services.  Call for this free service at [phone].             Medications           Message regarding Medications     Verify the changes and/or additions to your medication regime listed below are the same as discussed with your clinician today.  If any of these changes or additions are incorrect, please notify your healthcare provider.             Verify that the below list of medications is an accurate representation of the medications you are currently taking.  If none reported, the list may be blank. If incorrect, please contact your healthcare provider. Carry this list with you in case of emergency.           Current Medications     ACETAMINOPHEN (TYLENOL 8 HOUR ORAL) Take 2 capsules by mouth daily as needed.    ACETAMINOPHEN WITH CODEINE (TYLENOL-CODEINE #3 ORAL) Take by mouth every 4 to 6 hours as needed.    apixaban (ELIQUIS) 2.5 mg Tab Take 2.5 mg by mouth 2 (two) times daily.     aspirin (ECOTRIN) 81 MG EC tablet Take 81 mg by mouth once daily.    atenolol (TENORMIN) 50 MG tablet Take 1 tablet (50 mg total) by mouth 2 (two) times daily.    CALCIUM ORAL Take 1 tablet by  Patient is in the supine position.   The body was positioned using the following devices: safety strap and gel pad mattress.  The head was positioned using the following devices: gel pad mattress.  The left arm was positioned using the following devices: arm board.  The right arm was positioned using the following devices: arm board.  The patient was positioned by Josh Varner, Regina Torres, Lyndsey Manzanares, Miguel Caputo RN  "mouth once daily.    fluticasone (FLONASE) 50 mcg/actuation nasal spray 2 sprays by Each Nare route once daily.    levothyroxine (SYNTHROID) 50 MCG tablet TAKE 1 TABLET BY MOUTH DAILY    lisinopril 10 MG tablet Take 10 mg by mouth once daily.    multivitamin (ONE DAILY MULTIVITAMIN) per tablet Take 1 tablet by mouth once daily.    PATADAY 0.2 % Drop INSTILL 1 DROP IN EACH EYE ONCE DAILY    ranitidine (ZANTAC) 150 MG capsule Take 1 capsule (150 mg total) by mouth 2 (two) times daily.    simvastatin (ZOCOR) 20 MG tablet TAKE 1 TABLET (20 MG TOTAL) BY MOUTH EVERY EVENING.    triamcinolone acetonide 0.025% (KENALOG) 0.025 % cream 1 application 2 (two) times daily as needed. Apply to affected area    VIT C/VIT E/LUTEIN/MIN/OMEGA-3 (OCUVITE ORAL) Take 1 tablet by mouth once daily.           Clinical Reference Information           Your Vitals Were     BP Pulse Temp Height Weight BMI    136/86 71 98 °F (36.7 °C) 5' 4" (1.626 m) 66.5 kg (146 lb 9.7 oz) 25.16 kg/m2      Blood Pressure          Most Recent Value    BP  136/86      Allergies as of 2/16/2017     No Known Drug Allergies      Immunizations Administered on Date of Encounter - 2/16/2017     None      Orders Placed During Today's Visit      Normal Orders This Visit    Case Request Operating Room: CYSTOSCOPY     POCT URINE DIPSTICK WITHOUT MICROSCOPE     Urine culture     Urine Dip with micro, POC     Future Labs/Procedures Expected by Expires    Basic metabolic panel  2/16/2017 2/16/2018    CT Urogram Abd Pelvis W WO  2/16/2017 2/16/2018      Language Assistance Services     ATTENTION: Language assistance services are available, free of charge. Please call [phone].      ATENCIÓN: Si habla lali, tiene a marmolejo disposición servicios gratuitos de asistencia lingüística. Lljennifer al [phone].     CHÚ Ý: N?u b?n nói Ti?ng Vi?t, có các d?ch v? h? tr? ngôn ng? mi?n phí dành cho b?n. G?i s? [phone].         [facility] complies with applicable " Federal civil rights laws and does not discriminate on the basis of race, color, national origin, age, disability, or sex.

## 2022-09-20 ENCOUNTER — HOSPITAL ENCOUNTER (OUTPATIENT)
Dept: RADIOLOGY | Facility: HOSPITAL | Age: 87
Discharge: HOME | End: 2022-09-20
Attending: FAMILY MEDICINE
Payer: MEDICARE

## 2022-09-20 DIAGNOSIS — R19.00 PELVIC MASS IN FEMALE: ICD-10-CM

## 2022-09-21 ENCOUNTER — LAB VISIT (OUTPATIENT)
Dept: LAB | Facility: HOSPITAL | Age: 87
End: 2022-09-21
Attending: FAMILY MEDICINE
Payer: MEDICARE

## 2022-09-21 DIAGNOSIS — E87.1 HYPOSMOLALITY SYNDROME: Primary | ICD-10-CM

## 2022-09-21 LAB
ANION GAP SERPL CALC-SCNC: 7 MMOL/L (ref 8–16)
BUN SERPL-MCNC: 7 MG/DL (ref 10–30)
CALCIUM SERPL-MCNC: 10.7 MG/DL (ref 8.7–10.5)
CHLORIDE SERPL-SCNC: 103 MMOL/L (ref 95–110)
CO2 SERPL-SCNC: 28 MMOL/L (ref 23–29)
CREAT SERPL-MCNC: 0.7 MG/DL (ref 0.5–1.4)
EST. GFR  (NO RACE VARIABLE): >60 ML/MIN/1.73 M^2
GLUCOSE SERPL-MCNC: 95 MG/DL (ref 70–110)
POTASSIUM SERPL-SCNC: 4.2 MMOL/L (ref 3.5–5.1)
SODIUM SERPL-SCNC: 138 MMOL/L (ref 136–145)

## 2022-09-21 PROCEDURE — 80048 BASIC METABOLIC PNL TOTAL CA: CPT | Performed by: FAMILY MEDICINE

## 2022-09-27 DIAGNOSIS — D53.9 MACROCYTIC ANEMIA: Primary | ICD-10-CM

## 2022-09-27 DIAGNOSIS — K92.2 GASTROINTESTINAL HEMORRHAGE, UNSPECIFIED GASTROINTESTINAL HEMORRHAGE TYPE: ICD-10-CM

## 2022-09-27 NOTE — TELEPHONE ENCOUNTER
----- Message from Javier Mejia sent at 9/27/2022 10:03 AM CDT -----  Contact: self  Type: Needs Medical Advice  Who Called: Patient   Best Call Back Number: [phone] (Ms. Thurston)   Additional Information: Pt went to urgent care on Saturday and states they told her she is anemic and needs b-12 shots. The pts daughter states s he is going to fax over results to the office. Plz call and confirm that those fax were received once sent. Thanks

## 2022-09-28 ENCOUNTER — LAB VISIT (OUTPATIENT)
Dept: LAB | Facility: HOSPITAL | Age: 87
End: 2022-09-28
Attending: FAMILY MEDICINE
Payer: MEDICARE

## 2022-09-28 ENCOUNTER — TELEPHONE (OUTPATIENT)
Dept: FAMILY MEDICINE | Facility: CLINIC | Age: 87
End: 2022-09-28
Payer: MEDICARE

## 2022-09-28 DIAGNOSIS — E87.1 HYPOSMOLALITY SYNDROME: Primary | ICD-10-CM

## 2022-09-28 LAB
ANION GAP SERPL CALC-SCNC: 7 MMOL/L (ref 8–16)
BUN SERPL-MCNC: 9 MG/DL (ref 10–30)
CALCIUM SERPL-MCNC: 10.2 MG/DL (ref 8.7–10.5)
CHLORIDE SERPL-SCNC: 104 MMOL/L (ref 95–110)
CO2 SERPL-SCNC: 24 MMOL/L (ref 23–29)
CREAT SERPL-MCNC: 0.8 MG/DL (ref 0.5–1.4)
EST. GFR  (NO RACE VARIABLE): >60 ML/MIN/1.73 M^2
GLUCOSE SERPL-MCNC: 87 MG/DL (ref 70–110)
POTASSIUM SERPL-SCNC: 4.6 MMOL/L (ref 3.5–5.1)
SODIUM SERPL-SCNC: 135 MMOL/L (ref 136–145)

## 2022-09-28 PROCEDURE — 80048 BASIC METABOLIC PNL TOTAL CA: CPT | Performed by: FAMILY MEDICINE

## 2022-09-28 NOTE — TELEPHONE ENCOUNTER
Patient's daughter (Bertrand) returned call to office. Advised of information in attached message from Dr. Fulton. States will come to office within the next couple of days to  hemoccult cards. Lab appointment scheduled for the date of 9-30-22 per Mrs. Thurston's request.

## 2022-09-28 NOTE — TELEPHONE ENCOUNTER
Call placed to patient's daughter (Bertrand). No answer received. Left message, including date/time of call, requesting return call to office.

## 2022-09-28 NOTE — TELEPHONE ENCOUNTER
She has been mildly anemic with larger than normal red blood cells suggestive of B12 deficiency and has had hemoglobins in the upper 11 range for about three years.  Dr. Steele did a B12 level last year and it was normal.  There was no deficiency at that time when her cells suggested there would be.  Her hemoglobin has dropped from 11.7 on August 30 to 8.8 on September 24, a three point drop in 3-1/2 weeks strongly suggest bleeding internally.  It takes about two years to deplete the body's B12 supply and it would take months if not a year to create that degree of change if it was purely due to a B12 deficiency.  I suggest that we get hemoccults x3 immediately.  We will check a B12, folate, and reticulocyte count as well.    She is on Eliquis and aspirin which puts her at high risk of bleeding.  We need to get the hemoccults right away so we can stop those if it is necessary.  She is already taking Prilosec 20 mg daily, I suggest we increase it to 40 mg immediately.  I would have her stop the aspirin now, it takes longer for that to clear out of her system than the Eliquis and she is on low-dose Eliquis.    Orders in for Hemoccult x3, (not a fit kit), iron levels, reticulocyte count, folate level, and B12 levels.

## 2022-09-29 RX ORDER — FAMOTIDINE 40 MG/1
40 TABLET, FILM COATED ORAL DAILY
Qty: 30 TABLET | Refills: 11
Start: 2022-09-29 | End: 2024-03-17

## 2022-09-29 NOTE — TELEPHONE ENCOUNTER
Please sign mar change from Omeprazole to Pepcid 40mg-changed by Dr. Stephen. I cancelled lab appt and faxed orders to [facility] at [phone].

## 2022-09-29 NOTE — TELEPHONE ENCOUNTER
No new care gaps identified.  [facility] Embedded Care Gaps. Reference number: [id number]. 9/29/2022   10:05:41 AM BASHIR

## 2022-10-04 ENCOUNTER — TELEPHONE (OUTPATIENT)
Dept: FAMILY MEDICINE | Facility: CLINIC | Age: 87
End: 2022-10-04
Payer: MEDICARE

## 2022-10-04 NOTE — TELEPHONE ENCOUNTER
Patient's daughter (Bertrand)  patient was seen at a clinic on [facility] prior to seeing Dr. Fulton on 9-6-22. States urine was collected. And patient advised her she received a call on yesterday from this clinic advising she has a uti and need to take Famotidine. (Please refer to documentation from MICHELLE Landaverde in encounter dated 9-27-22 regarding Famotidine/Omeprazole). Writer advised Mrs. Thurston Famotidine is prescribed for GERD. Also advised Mrs. Thurston of documentation regarding this medication as mentioned above. Advised Mrs Thurston to follow up with the clinic patient was seen at; to confirm what call was regarding, as it is unlikely regarding uti, as 1 month time has elapsed.

## 2022-10-04 NOTE — TELEPHONE ENCOUNTER
----- Message from Gabrielle Maysry sent at 10/4/2022 10:57 AM CDT -----  .Type:  Patient Call Back    Who Called: ARSENIO PT DAUGHTER       Does the patient know what this is regarding?: PT WENT TO CLINIC FOR UTI SHE WAS GIVEN MEDICATION SHE WANTS TO SPEAK WITH THE OFFICE ABOUT IT      Would the patient rather a call back YES     Best Call Back Number: [phone]    Additional Information: Thank You

## 2022-10-05 ENCOUNTER — LAB VISIT (OUTPATIENT)
Dept: LAB | Facility: HOSPITAL | Age: 87
End: 2022-10-05
Attending: FAMILY MEDICINE
Payer: MEDICARE

## 2022-10-05 DIAGNOSIS — D53.9 SIMPLE CHRONIC ANEMIA: Primary | ICD-10-CM

## 2022-10-05 LAB
CTP QC/QA: YES
FECAL OCCULT BLOOD, POC: NEGATIVE
FERRITIN SERPL-MCNC: 207 NG/ML (ref 20–300)
FOLATE SERPL-MCNC: 18.5 NG/ML (ref 4–24)
IRON SERPL-MCNC: 84 UG/DL (ref 30–160)
IRON SERPL-MCNC: 84 UG/DL (ref 30–160)
RETICS/RBC NFR AUTO: 2.4 % (ref 0.5–2.5)
SATURATED IRON: 23 % (ref 20–50)
TOTAL IRON BINDING CAPACITY: 364 UG/DL (ref 250–450)
TRANSFERRIN SERPL-MCNC: 246 MG/DL (ref 200–375)
VIT B12 SERPL-MCNC: 528 PG/ML (ref 210–950)

## 2022-10-05 PROCEDURE — 85045 AUTOMATED RETICULOCYTE COUNT: CPT | Performed by: FAMILY MEDICINE

## 2022-10-05 PROCEDURE — 82607 VITAMIN B-12: CPT | Performed by: FAMILY MEDICINE

## 2022-10-05 PROCEDURE — 82746 ASSAY OF FOLIC ACID SERUM: CPT | Performed by: FAMILY MEDICINE

## 2022-10-05 PROCEDURE — 82728 ASSAY OF FERRITIN: CPT | Performed by: FAMILY MEDICINE

## 2022-10-05 PROCEDURE — 84466 ASSAY OF TRANSFERRIN: CPT | Performed by: FAMILY MEDICINE

## 2022-10-06 ENCOUNTER — TELEPHONE (OUTPATIENT)
Dept: FAMILY MEDICINE | Facility: CLINIC | Age: 87
End: 2022-10-06
Payer: MEDICARE

## 2022-10-06 DIAGNOSIS — D64.9 ANEMIA, UNSPECIFIED TYPE: Primary | ICD-10-CM

## 2022-10-06 NOTE — TELEPHONE ENCOUNTER
Call placed to patient's daughter (Bertrand) for notification. Mrs. Thurston verbalized understanding.

## 2022-10-06 NOTE — TELEPHONE ENCOUNTER
Patient's daughter (Bertrand) notified. Verbalized understanding. Lab order for CBC faxed to [facility] ([phone]) for follow up in 6 weeks. Mrs. Thurston is requesting to notify Dr. Fulton patient had CT of Abdomen performed during hospital encounter on 8-26-22 and was advised she have a mass which requires further follow up of US which is scheduled for the date of 10-18-22. Will send follow up message to Dr. Fulton for notification.

## 2022-10-06 NOTE — TELEPHONE ENCOUNTER
----- Message from Alexus Devi sent at 10/6/2022 10:25 AM CDT -----  Contact: Mihaela Thurston  Type: Patient Call Back         Who called: Mihaela Thurston         What is the request in detail: returning a missed call from Genoveva; states it is regarding to Dr. Fulton; please advise        Best call back number: [phone]         Additional Information:            Thank You

## 2022-10-06 NOTE — TELEPHONE ENCOUNTER
----- Message from Dale Fulton MD sent at 10/6/2022  4:21 PM CDT -----   The reticulocytes are normal.  These are  blood cells and it tells us that the bone marrow is making blood.    The iron levels, B12 and folate levels are all normal.  The bone marrow has the materials it needs to make blood.    I would recommend checking a CBC again in about six weeks.  If the blood count continues to decline we will need to have the GI department check her stomach for possible ulcers and perhaps check the colon  also if necessary.    Orders are in for CBC     Patient notified by e-mail

## 2022-10-06 NOTE — TELEPHONE ENCOUNTER
----- Message from Dale Fulton MD sent at 10/5/2022  7:27 PM CDT -----  The Hemoccult tests are negative with no sign of active bleeding, the blood test she did are still pending at this time,   none of those have been done.    Patient notified by a male

## 2022-10-10 ENCOUNTER — TELEPHONE (OUTPATIENT)
Dept: OBSTETRICS AND GYNECOLOGY | Facility: CLINIC | Age: 87
End: 2022-10-10
Payer: MEDICARE

## 2022-10-10 DIAGNOSIS — I10 ESSENTIAL HYPERTENSION: ICD-10-CM

## 2022-10-10 DIAGNOSIS — E78.2 MIXED HYPERLIPIDEMIA: ICD-10-CM

## 2022-10-10 DIAGNOSIS — E03.9 ACQUIRED HYPOTHYROIDISM: ICD-10-CM

## 2022-10-10 DIAGNOSIS — E87.1 HYPONATREMIA: Primary | ICD-10-CM

## 2022-10-10 RX ORDER — SIMVASTATIN 20 MG/1
20 TABLET, FILM COATED ORAL NIGHTLY
Qty: 90 TABLET | Refills: 1 | Status: SHIPPED | OUTPATIENT
Start: 2022-10-10 | End: 2023-06-19

## 2022-10-10 RX ORDER — ATENOLOL 50 MG/1
50 TABLET ORAL 2 TIMES DAILY
Qty: 180 TABLET | Refills: 3 | Status: SHIPPED | OUTPATIENT
Start: 2022-10-10 | End: 2023-10-31 | Stop reason: SDUPTHER

## 2022-10-10 RX ORDER — LEVOTHYROXINE SODIUM 112 UG/1
56 TABLET ORAL DAILY
Qty: 45 TABLET | Refills: 1 | Status: SHIPPED | OUTPATIENT
Start: 2022-10-10 | End: 2023-05-18

## 2022-10-10 NOTE — TELEPHONE ENCOUNTER
Okay to cease fluid restriction.  Check BMP with her next lab, I had a CBC to be done in six weeks but I do not see the appointment for the lab scheduled.

## 2022-10-10 NOTE — TELEPHONE ENCOUNTER
----- Message from Mirela Arambula sent at 10/10/2022  3:43 PM CDT -----  Type: Needs Medical Advice  Who Called:  pt   Symptoms (please be specific):  pt said she wants to know if she can get of restricted water and no one can seam to answer her question and she would like to speak the dr--please call and advise  Best Call Back Number: [phone]    Additional Information: thank you

## 2022-10-10 NOTE — TELEPHONE ENCOUNTER
Refill Decision Note   Ama Lang  is requesting a refill authorization.  Brief Assessment and Rationale for Refill:  Approve     Medication Therapy Plan:  Acceptable use per [facility] protocols (levothyroxine and various); risks minimal with normal thyroid labs, TSH WNL    Medication Reconciliation Completed: No   Comments:     No Care Gaps recommended.     Note composed:5:43 PM 10/10/2022

## 2022-10-10 NOTE — TELEPHONE ENCOUNTER
No new care gaps identified.  [facility] Embedded Care Gaps. Reference number: [id number]. 10/10/2022   10:02:07 AM BASHIR

## 2022-10-10 NOTE — TELEPHONE ENCOUNTER
----- Message from Mirela Arambula sent at 10/10/2022  3:43 PM CDT -----  Type: Needs Medical Advice  Who Called:  pt   Symptoms (please be specific):  pt said she wants to know if she can get of restricted water and no one can seam to answer her question and she would like to speak the dr--please call and advise  Best Call Back Number: [phone]    Additional Information: thank you

## 2022-10-11 NOTE — TELEPHONE ENCOUNTER
Patient notified. Patient has home health previous lab orders were faxed to [facility] to be drawn in 6 weeks. Call placed to [facility]; was notified labs are scheduled for November 13. Will fax order for BMP to [facility] to add to next lab draw.

## 2022-10-18 ENCOUNTER — HOSPITAL ENCOUNTER (OUTPATIENT)
Dept: RADIOLOGY | Facility: HOSPITAL | Age: 87
Discharge: HOME | End: 2022-10-18
Attending: FAMILY MEDICINE
Payer: MEDICARE

## 2022-10-18 PROCEDURE — 76856 US EXAM PELVIC COMPLETE: CPT | Mod: TC,PO

## 2022-10-19 ENCOUNTER — DOCUMENT SCAN (OUTPATIENT)
Dept: HOME HEALTH SERVICES | Facility: HOSPITAL | Age: 87
End: 2022-10-19
Payer: MEDICARE

## 2022-10-20 ENCOUNTER — TELEPHONE (OUTPATIENT)
Dept: FAMILY MEDICINE | Facility: CLINIC | Age: 87
End: 2022-10-20
Payer: MEDICARE

## 2022-10-20 DIAGNOSIS — N83.8 OVARIAN MASS, LEFT: Primary | ICD-10-CM

## 2022-10-20 NOTE — TELEPHONE ENCOUNTER
----- Message from Cinthia Tee sent at 10/20/2022  9:41 AM CDT -----  Regarding: referral  Name of Who is Calling: ELIAS CAGLE [[id number]] Bertrand (daughter)      What is the request in detail: Daughter is requesting a referral to Dr. Ingrid Vidal in [place]. There was a growth found on her ovaries and she needs the GYN referral. Fax [phone]      Can the clinic reply by [facility]: no      What Number to Call Back [facility]: [phone]

## 2022-10-21 NOTE — TELEPHONE ENCOUNTER
Referral faxed. Call placed to patient's daughter (Bertrand) for notification. Patient verbalized understanding.

## 2022-10-21 NOTE — TELEPHONE ENCOUNTER
Excellent choice.  Dr. Vidal helped me with a similar problem earlier this year.  The consult order is in.

## 2022-11-01 PROCEDURE — G0179 MD RECERTIFICATION HHA PT: HCPCS | Mod: ,,, | Performed by: FAMILY MEDICINE

## 2022-11-01 PROCEDURE — G0179 PR HOME HEALTH MD RECERTIFICATION: ICD-10-PCS | Mod: ,,, | Performed by: FAMILY MEDICINE

## 2022-11-03 ENCOUNTER — EXTERNAL HOME HEALTH (OUTPATIENT)
Dept: HOME HEALTH SERVICES | Facility: HOSPITAL | Age: 87
End: 2022-11-03
Payer: MEDICARE

## 2022-11-03 ENCOUNTER — PATIENT OUTREACH (OUTPATIENT)
Dept: HOME HEALTH SERVICES | Facility: HOSPITAL | Age: 87
End: 2022-11-03
Payer: MEDICARE

## 2022-11-16 ENCOUNTER — LAB VISIT (OUTPATIENT)
Dept: LAB | Facility: HOSPITAL | Age: 87
End: 2022-11-16
Attending: FAMILY MEDICINE
Payer: MEDICARE

## 2022-11-16 DIAGNOSIS — E87.1 HYPOSMOLALITY SYNDROME: Primary | ICD-10-CM

## 2022-11-16 DIAGNOSIS — D64.9 ANEMIA, UNSPECIFIED: ICD-10-CM

## 2022-11-16 LAB
ANION GAP SERPL CALC-SCNC: 5 MMOL/L (ref 8–16)
BASOPHILS # BLD AUTO: 0.06 K/UL (ref 0–0.2)
BASOPHILS NFR BLD: 2.1 % (ref 0–1.9)
BUN SERPL-MCNC: 16 MG/DL (ref 10–30)
CALCIUM SERPL-MCNC: 10.1 MG/DL (ref 8.7–10.5)
CHLORIDE SERPL-SCNC: 102 MMOL/L (ref 95–110)
CO2 SERPL-SCNC: 28 MMOL/L (ref 23–29)
CREAT SERPL-MCNC: 0.8 MG/DL (ref 0.5–1.4)
DIFFERENTIAL METHOD: ABNORMAL
EOSINOPHIL # BLD AUTO: 0.1 K/UL (ref 0–0.5)
EOSINOPHIL NFR BLD: 2.9 % (ref 0–8)
ERYTHROCYTE [DISTWIDTH] IN BLOOD BY AUTOMATED COUNT: 13.9 % (ref 11.5–14.5)
EST. GFR  (NO RACE VARIABLE): >60 ML/MIN/1.73 M^2
GLUCOSE SERPL-MCNC: 101 MG/DL (ref 70–110)
HCT VFR BLD AUTO: 31 % (ref 37–48.5)
HGB BLD-MCNC: 10.2 G/DL (ref 12–16)
IMM GRANULOCYTES # BLD AUTO: 0.05 K/UL (ref 0–0.04)
IMM GRANULOCYTES NFR BLD AUTO: 1.8 % (ref 0–0.5)
LYMPHOCYTES # BLD AUTO: 1 K/UL (ref 1–4.8)
LYMPHOCYTES NFR BLD: 35.7 % (ref 18–48)
MCH RBC QN AUTO: 37.2 PG (ref 27–31)
MCHC RBC AUTO-ENTMCNC: 32.9 G/DL (ref 32–36)
MCV RBC AUTO: 113 FL (ref 82–98)
MONOCYTES # BLD AUTO: 0.4 K/UL (ref 0.3–1)
MONOCYTES NFR BLD: 13.6 % (ref 4–15)
NEUTROPHILS # BLD AUTO: 1.2 K/UL (ref 1.8–7.7)
NEUTROPHILS NFR BLD: 43.9 % (ref 38–73)
NRBC BLD-RTO: 0 /100 WBC
PLATELET # BLD AUTO: 111 K/UL (ref 150–450)
PMV BLD AUTO: 14.1 FL (ref 9.2–12.9)
POTASSIUM SERPL-SCNC: 4.5 MMOL/L (ref 3.5–5.1)
RBC # BLD AUTO: 2.74 M/UL (ref 4–5.4)
SODIUM SERPL-SCNC: 135 MMOL/L (ref 136–145)
WBC # BLD AUTO: 2.8 K/UL (ref 3.9–12.7)

## 2022-11-16 PROCEDURE — 80048 BASIC METABOLIC PNL TOTAL CA: CPT | Performed by: FAMILY MEDICINE

## 2022-11-16 PROCEDURE — 36415 COLL VENOUS BLD VENIPUNCTURE: CPT | Performed by: FAMILY MEDICINE

## 2022-11-16 PROCEDURE — 85025 COMPLETE CBC W/AUTO DIFF WBC: CPT | Performed by: FAMILY MEDICINE

## 2022-11-23 ENCOUNTER — TELEPHONE (OUTPATIENT)
Dept: FAMILY MEDICINE | Facility: CLINIC | Age: 87
End: 2022-11-23
Payer: MEDICARE

## 2022-11-23 DIAGNOSIS — D64.9 ANEMIA, UNSPECIFIED TYPE: Primary | ICD-10-CM

## 2022-11-23 DIAGNOSIS — D61.818 PANCYTOPENIA: ICD-10-CM

## 2022-11-23 NOTE — TELEPHONE ENCOUNTER
If she does not know if she is taking what she has already been prescribed then adding more medications will not solve the problem.  I think she needs a prescription for assisted living and supervision.  Is she using a medication box set up a week at a time?   She would be able to look and see if the pills are still there and would know if she did not take them.  She should not be working out of the pharmacy bottles.

## 2022-11-23 NOTE — TELEPHONE ENCOUNTER
----- Message from Dale Fulton MD sent at 11/17/2022  9:52 PM CST -----  The sodium level is holding steady compared to September.  No improvement but it has not dropped anymore either.  The blood sugar is in the high normal range and the rest of the chemistry panel looks good with good kidney functions.  No changes are needed here.      The blood count is dropping again the white cells and platelets are also low so this may be blood loss or it may be a bone marrow problem.  I would suggest a consult with GI and Hematology.    Patient notified by e-mail

## 2022-11-23 NOTE — TELEPHONE ENCOUNTER
Lab results discussed with patient and daughter Bertrand White. They would like Dr. Jones for gi. Will take Dr. Pierce recommendation on hematology. Patient prefers to wait until after the first of the year to address this.

## 2022-11-23 NOTE — TELEPHONE ENCOUNTER
--Call placed to patient's daughter (Bertrand) for notification, verbalized understanding.  Advised Mrs. Thurston hematology scheduled their appointments independently, and will be reaching out to patient to assist with scheduling. External referral faxed to Dr. Jones.     --Mrs. Thurston requesting to send a message to Dr. Fulton notifying him patient was seen today in office by Dr. Ingrid Vidal. States blood pressure was assessed twice with the following readings:    173/126  180/100    While reviewing blood pressure medications on file for patient. Patient confirmed she takes Lisinopril daily, Amlodipine daily, and Atenolol daily. Current order for Atenolol states take twice daily. Patient states she was advised to only take Atenolol once daily. Patient reports headache now. States she was dizzy earlier today prior to appointment with Dr. Vidal. Patient states she took Lisinopril this morning, and was unable to confirm if she took any of the other blood pressure medication this morning. Mrs. Thurston is requesting to send a message to Dr. Fulton regarding.

## 2022-11-25 ENCOUNTER — TELEPHONE (OUTPATIENT)
Dept: HEMATOLOGY/ONCOLOGY | Facility: CLINIC | Age: 87
End: 2022-11-25
Payer: MEDICARE

## 2022-11-25 NOTE — TELEPHONE ENCOUNTER
Spoke to Mrs Lang's daughter Bertrand regarding referral to Hematology she stated her mother would like to wait and schedule in [place] she was advised that we will defer and call her back then she verbalized understanding to all

## 2022-11-29 NOTE — TELEPHONE ENCOUNTER
Call placed to patient's daughter (Bertrand) for notification. Mrs. Thurston verbalized understanding.  States she has been trying to get patient to use a pill box for years, and patient declines. States she believes this is due to patient thinking she is losing an aspect of independence. Also states patient will not agree to assisted living. States patient has a daughter who lives in the home with her, but is not really much help around the house. States the live in daughter is there and will be able to call for assistance if patient falls, but other than that is not much help in patient's care. Mrs. Thurston states she will tell patient Dr. Fulton thinks it's best for her to use a pill box, and see if she will reconsider, and use a pill box. Will send follow up message to Dr. Fulton for notification.

## 2022-12-14 ENCOUNTER — TELEPHONE (OUTPATIENT)
Dept: FAMILY MEDICINE | Facility: CLINIC | Age: 87
End: 2022-12-14
Payer: MEDICARE

## 2022-12-14 NOTE — TELEPHONE ENCOUNTER
----- Message from Aarti Alvarez sent at 12/14/2022 11:20 AM CST -----  Type: Patient Call Back         Who called:Katie Levin Pt Home health Nurse          What is the request in detail:  Pt has a fever of 101 and diarrhea and she throw up , nurse give the pt tylenol to bring fever down          Can the clinic reply by [facility]? No          Would the patient rather a call back or a response via [facility]? Call back          Best call back number:[phone]         Additional Information:           Thank You

## 2022-12-14 NOTE — TELEPHONE ENCOUNTER
Spoke to Katie- patient had 4 diarrhea stools today and vomited once- fever 101- advised on clear fluids and bland diet- Tylenol every 4 hrs prn. Observe - go to ED if worsens.

## 2022-12-31 PROCEDURE — G0179 MD RECERTIFICATION HHA PT: HCPCS | Mod: ,,, | Performed by: FAMILY MEDICINE

## 2022-12-31 PROCEDURE — G0179 PR HOME HEALTH MD RECERTIFICATION: ICD-10-PCS | Mod: ,,, | Performed by: FAMILY MEDICINE

## 2023-01-04 ENCOUNTER — TELEPHONE (OUTPATIENT)
Dept: HEMATOLOGY/ONCOLOGY | Facility: CLINIC | Age: 88
End: 2023-01-04
Payer: MEDICARE

## 2023-01-04 NOTE — TELEPHONE ENCOUNTER
Spoke to Mrs Rickey daughter regarding referral to Hematology she stated her mother is just getting over COVID and not ready to schedule she asked that I call back in a month will defer until then

## 2023-01-10 ENCOUNTER — EXTERNAL HOME HEALTH (OUTPATIENT)
Dept: HOME HEALTH SERVICES | Facility: HOSPITAL | Age: 88
End: 2023-01-10
Payer: MEDICARE

## 2023-01-10 ENCOUNTER — PATIENT OUTREACH (OUTPATIENT)
Dept: HOME HEALTH SERVICES | Facility: HOSPITAL | Age: 88
End: 2023-01-10
Payer: MEDICARE

## 2023-01-17 ENCOUNTER — TELEPHONE (OUTPATIENT)
Dept: FAMILY MEDICINE | Facility: CLINIC | Age: 88
End: 2023-01-17
Payer: MEDICARE

## 2023-01-21 ENCOUNTER — DOCUMENT SCAN (OUTPATIENT)
Dept: HOME HEALTH SERVICES | Facility: HOSPITAL | Age: 88
End: 2023-01-21
Payer: MEDICARE

## 2023-01-23 DIAGNOSIS — R19.00 PELVIC LUMP: Primary | ICD-10-CM

## 2023-01-25 ENCOUNTER — DOCUMENT SCAN (OUTPATIENT)
Dept: HOME HEALTH SERVICES | Facility: HOSPITAL | Age: 88
End: 2023-01-25
Payer: MEDICARE

## 2023-02-03 ENCOUNTER — TELEPHONE (OUTPATIENT)
Dept: HEMATOLOGY/ONCOLOGY | Facility: CLINIC | Age: 88
End: 2023-02-03
Payer: MEDICARE

## 2023-02-03 NOTE — TELEPHONE ENCOUNTER
Called patient regarding referral to Hematology spoke to her daughter appt declined advised to call when ready to schedule she verbalized understanding

## 2023-02-09 ENCOUNTER — EXTERNAL HOME HEALTH (OUTPATIENT)
Dept: HOME HEALTH SERVICES | Facility: HOSPITAL | Age: 88
End: 2023-02-09
Payer: MEDICARE

## 2023-03-01 PROCEDURE — G0179 MD RECERTIFICATION HHA PT: HCPCS | Mod: ,,, | Performed by: FAMILY MEDICINE

## 2023-03-01 PROCEDURE — G0179 PR HOME HEALTH MD RECERTIFICATION: ICD-10-PCS | Mod: ,,, | Performed by: FAMILY MEDICINE

## 2023-03-02 ENCOUNTER — TELEPHONE (OUTPATIENT)
Dept: FAMILY MEDICINE | Facility: CLINIC | Age: 88
End: 2023-03-02
Payer: MEDICARE

## 2023-03-02 DIAGNOSIS — N30.00 ACUTE CYSTITIS WITHOUT HEMATURIA: Primary | ICD-10-CM

## 2023-03-02 NOTE — TELEPHONE ENCOUNTER
Mirela [facility] Staff  Caller: Unspecified (Today,  1:19 PM)    ----- Message from Mirela Arambula sent at 3/2/2023  1:19 PM CST -----  Type: Needs Medical Advice  Who Called:  Dania Home Health Nurse  Symptoms (please be specific):  said pt just finished her abx for a UTI and she still not feeling right and she wanted to know if she can get a order for a UA she can get it and drop it off--please call and advise  Best Call Back Number:  [phone]  Additional Information: thank you

## 2023-03-02 NOTE — TELEPHONE ENCOUNTER
Dania (nurse) with [facility] states patient was prescribed antibiotics for uti at urgent care. Patient completed antibiotic on yesterday. States she continues to have frequent urination. Also reports discomfort in abdomen and private area. Requesting to know if an order for urinalysis can be placed to see if uti still present. Please advise. Thank you.

## 2023-03-03 ENCOUNTER — LAB VISIT (OUTPATIENT)
Dept: LAB | Facility: HOSPITAL | Age: 88
End: 2023-03-03
Attending: FAMILY MEDICINE
Payer: MEDICARE

## 2023-03-03 DIAGNOSIS — N30.00 ACUTE CYSTITIS WITHOUT HEMATURIA: ICD-10-CM

## 2023-03-03 LAB
BILIRUB UR QL STRIP: NEGATIVE
CLARITY UR: CLEAR
COLOR UR: YELLOW
GLUCOSE UR QL STRIP: NEGATIVE
HGB UR QL STRIP: NEGATIVE
KETONES UR QL STRIP: NEGATIVE
LEUKOCYTE ESTERASE UR QL STRIP: NEGATIVE
NITRITE UR QL STRIP: NEGATIVE
PH UR STRIP: 8 [PH] (ref 5–8)
PROT UR QL STRIP: NEGATIVE
SP GR UR STRIP: 1.01 (ref 1–1.03)
URN SPEC COLLECT METH UR: NORMAL
UROBILINOGEN UR STRIP-ACNC: NEGATIVE EU/DL

## 2023-03-03 PROCEDURE — 81003 URINALYSIS AUTO W/O SCOPE: CPT | Performed by: FAMILY MEDICINE

## 2023-03-03 PROCEDURE — 87086 URINE CULTURE/COLONY COUNT: CPT | Performed by: FAMILY MEDICINE

## 2023-03-03 NOTE — TELEPHONE ENCOUNTER
I need records, name of the antibiotic, where did she go, I need urine results, records, and especially culture results if they did one.

## 2023-03-03 NOTE — TELEPHONE ENCOUNTER
Orders are in for urinalysis and culture.  Patient did have a positive urinary tract infection on urinalysis and culture grew Klebsiella oxytoca.  She was treated with Macrobid and the organism was sensitive to the Macrobid.  Patient still having symptoms and had home health drop off a urine specimen at the hospital lab.  Cultures placed for stat urinalysis and repeat culture

## 2023-03-05 LAB
BACTERIA UR CULT: NORMAL
BACTERIA UR CULT: NORMAL

## 2023-03-09 ENCOUNTER — EXTERNAL HOME HEALTH (OUTPATIENT)
Dept: HOME HEALTH SERVICES | Facility: HOSPITAL | Age: 88
End: 2023-03-09
Payer: MEDICARE

## 2023-03-09 ENCOUNTER — PATIENT OUTREACH (OUTPATIENT)
Dept: HOME HEALTH SERVICES | Facility: HOSPITAL | Age: 88
End: 2023-03-09
Payer: MEDICARE

## 2023-03-22 ENCOUNTER — TELEPHONE (OUTPATIENT)
Dept: FAMILY MEDICINE | Facility: CLINIC | Age: 88
End: 2023-03-22
Payer: MEDICARE

## 2023-03-22 ENCOUNTER — LAB VISIT (OUTPATIENT)
Dept: LAB | Facility: HOSPITAL | Age: 88
End: 2023-03-22
Attending: FAMILY MEDICINE
Payer: MEDICARE

## 2023-03-22 DIAGNOSIS — N83.209 OVARIAN CYST: Primary | ICD-10-CM

## 2023-03-22 DIAGNOSIS — R35.0 URINARY FREQUENCY: Primary | ICD-10-CM

## 2023-03-22 DIAGNOSIS — N30.00 ACUTE CYSTITIS WITHOUT HEMATURIA: Primary | ICD-10-CM

## 2023-03-22 DIAGNOSIS — R30.0 BURNING WITH URINATION: ICD-10-CM

## 2023-03-22 DIAGNOSIS — R35.0 URINARY FREQUENCY: ICD-10-CM

## 2023-03-22 LAB
BACTERIA #/AREA URNS HPF: ABNORMAL /HPF
BILIRUB UR QL STRIP: NEGATIVE
CLARITY UR: ABNORMAL
COLOR UR: YELLOW
GLUCOSE UR QL STRIP: NEGATIVE
HGB UR QL STRIP: NEGATIVE
HYALINE CASTS #/AREA URNS LPF: 8 /LPF
KETONES UR QL STRIP: NEGATIVE
LEUKOCYTE ESTERASE UR QL STRIP: ABNORMAL
MICROSCOPIC COMMENT: ABNORMAL
NITRITE UR QL STRIP: NEGATIVE
PH UR STRIP: 7 [PH] (ref 5–8)
PROT UR QL STRIP: ABNORMAL
RBC #/AREA URNS HPF: 2 /HPF (ref 0–4)
SP GR UR STRIP: 1 (ref 1–1.03)
SQUAMOUS #/AREA URNS HPF: 0 /HPF
URN SPEC COLLECT METH UR: ABNORMAL
UROBILINOGEN UR STRIP-ACNC: NEGATIVE EU/DL
WBC #/AREA URNS HPF: >100 /HPF (ref 0–5)

## 2023-03-22 PROCEDURE — 87086 URINE CULTURE/COLONY COUNT: CPT | Performed by: FAMILY MEDICINE

## 2023-03-22 PROCEDURE — 87077 CULTURE AEROBIC IDENTIFY: CPT | Performed by: FAMILY MEDICINE

## 2023-03-22 PROCEDURE — 81001 URINALYSIS AUTO W/SCOPE: CPT | Performed by: FAMILY MEDICINE

## 2023-03-22 PROCEDURE — 87186 SC STD MICRODIL/AGAR DIL: CPT | Performed by: FAMILY MEDICINE

## 2023-03-22 RX ORDER — AMOXICILLIN AND CLAVULANATE POTASSIUM 500; 125 MG/1; MG/1
1 TABLET, FILM COATED ORAL 2 TIMES DAILY
Qty: 14 TABLET | Refills: 0 | Status: SHIPPED | OUTPATIENT
Start: 2023-03-22 | End: 2023-03-29

## 2023-03-22 NOTE — TELEPHONE ENCOUNTER
Patient with urinary burning and frequency- back and abdominal discomfort-afebrile- she has [facility] coming out today. Orders for ua and culture faxed to  at [phone].

## 2023-03-22 NOTE — TELEPHONE ENCOUNTER
----- Message from Adonay Faith sent at 3/22/2023  7:11 AM CDT -----  Contact: Self  Type:  Same Day Appointment Request    Caller is requesting a same day appointment.  Caller declined first available appointment listed below.    Name of Caller:patient  When is the first available appointment?N/A  Symptoms:frequent urination and pain  Best Call Back Number:[phone]     Additional Information: States she would like to be seen today because of the frequent urination and pain - please advise - thank you

## 2023-03-24 ENCOUNTER — TELEPHONE (OUTPATIENT)
Dept: FAMILY MEDICINE | Facility: CLINIC | Age: 88
End: 2023-03-24
Payer: MEDICARE

## 2023-03-24 DIAGNOSIS — B96.89 UTI DUE TO KLEBSIELLA SPECIES: Primary | ICD-10-CM

## 2023-03-24 DIAGNOSIS — N39.0 UTI DUE TO KLEBSIELLA SPECIES: Primary | ICD-10-CM

## 2023-03-24 LAB — BACTERIA UR CULT: ABNORMAL

## 2023-03-24 RX ORDER — NITROFURANTOIN 25; 75 MG/1; MG/1
100 CAPSULE ORAL 2 TIMES DAILY
Qty: 14 CAPSULE | Refills: 0 | Status: SHIPPED | OUTPATIENT
Start: 2023-03-24 | End: 2023-03-31

## 2023-03-24 NOTE — TELEPHONE ENCOUNTER
----- Message from Genoveva Petty MA sent at 3/24/2023  4:00 PM CDT -----  Patient notified by phone of results and verbalized understanding.   She states that she feels about the same.  She does feel tired, does not have fever.

## 2023-03-24 NOTE — TELEPHONE ENCOUNTER
Called patient, left message on recorder that I am calling in Macrobid 100 mg twice daily for seven days to replace the Augmentin.  Prescriptions sent to [facility]

## 2023-03-27 NOTE — TELEPHONE ENCOUNTER
I called the patient in regards to her prescription that was called in, patient expressed verbal understanding.    · Med Name & Dose: actos   · Last refill was 4/9/18Number of Refills sent: 3  · Quantity of medication given 90 day supply  · Last visit for that condition 11/19/18  · Date of next appt: 5/20/19  Date of any Lab results pertaining to medication:   Hemoglobin A1C (%)   Date Value   11/16/2018 6.8 (H)   ·   ·   Refilled.

## 2023-04-06 ENCOUNTER — HOSPITAL ENCOUNTER (OUTPATIENT)
Dept: RADIOLOGY | Facility: HOSPITAL | Age: 88
Discharge: HOME | End: 2023-04-06
Attending: OBSTETRICS & GYNECOLOGY
Payer: MEDICARE

## 2023-04-06 DIAGNOSIS — N83.209 OVARIAN CYST: ICD-10-CM

## 2023-04-06 PROCEDURE — 76856 US EXAM PELVIC COMPLETE: CPT | Mod: TC,PO

## 2023-04-19 ENCOUNTER — DOCUMENT SCAN (OUTPATIENT)
Dept: HOME HEALTH SERVICES | Facility: HOSPITAL | Age: 88
End: 2023-04-19
Payer: MEDICARE

## 2023-05-04 DIAGNOSIS — I10 ESSENTIAL HYPERTENSION: ICD-10-CM

## 2023-05-04 RX ORDER — LISINOPRIL 40 MG/1
TABLET ORAL
Qty: 90 TABLET | Refills: 1 | Status: SHIPPED | OUTPATIENT
Start: 2023-05-04 | End: 2023-10-31 | Stop reason: SDUPTHER

## 2023-05-04 NOTE — TELEPHONE ENCOUNTER
Care Due:                  Date            Visit Type   Department     Provider  --------------------------------------------------------------------------------                                [facility]  Last Visit: 09-      FOLLOW UP    PRACTICE       Dale Fulton  Next Visit: None Scheduled  None         None Found                                                            Last  Test          Frequency    Reason                     Performed    Due Date  --------------------------------------------------------------------------------    Lipid Panel.  12 months..  simvastatin..............  03-   03-    TSH.........  12 months..  levothyroxine............  03- 03-    [facility] Embedded Care Due Messages. Reference number: [id number].   5/04/2023 11:50:27 AM CDT

## 2023-05-05 NOTE — TELEPHONE ENCOUNTER
Refill Routing Note   Medication(s) are not appropriate for processing by [facility] for the following reason(s):      Drug-disease interaction    [facility] action(s):  Defer Labs due     Medication Therapy Plan: Drug-Disease: lisinopriL and Hyponatremia      Appointments  past 12m or future 3m with PCP    Date Provider   Last Visit   9/6/2022 Dale Fulton MD   Next Visit   Visit date not found Dale Fulton MD   ED visits in past 90 days: 0        Note composed:7:18 PM 05/04/2023

## 2023-05-05 NOTE — TELEPHONE ENCOUNTER
Refill Decision Note   Ama Lang  is requesting a refill authorization.  Brief Assessment and Rationale for Refill:  Approve     Medication Therapy Plan:       Medication Reconciliation Completed: No   Comments:     Provider Staff:     Action is required for this patient.   Please see care gap opportunities below in Care Due Message.     Thanks!  [facility]     Appointments      Date Provider   Last Visit   9/6/2022 Dale Fulton MD   Next Visit   Visit date not found Dale Fulton MD     Note composed:8:27 PM 05/04/2023           Note composed:8:27 PM 05/04/2023

## 2023-05-18 DIAGNOSIS — I10 ESSENTIAL HYPERTENSION: ICD-10-CM

## 2023-05-18 DIAGNOSIS — E78.49 OTHER HYPERLIPIDEMIA: Primary | ICD-10-CM

## 2023-05-18 DIAGNOSIS — E03.9 ACQUIRED HYPOTHYROIDISM: ICD-10-CM

## 2023-05-18 RX ORDER — LEVOTHYROXINE SODIUM 112 UG/1
TABLET ORAL
Qty: 45 TABLET | Refills: 0 | Status: SHIPPED | OUTPATIENT
Start: 2023-05-18 | End: 2023-08-21

## 2023-05-18 NOTE — TELEPHONE ENCOUNTER
Refill Routing Note   Medication(s) are not appropriate for processing by [facility] for the following reason(s):      Required labs outdated    [facility] action(s):  Defer None identified            Appointments  past 12m or future 3m with PCP    Date Provider   Last Visit   9/6/2022 Dale Fulton MD   Next Visit   Visit date not found Dale Fulton MD   ED visits in past 90 days: 0        Note composed:2:32 PM 05/18/2023

## 2023-05-18 NOTE — TELEPHONE ENCOUNTER
No care due was identified.  [facility] Embedded Care Due Messages. Reference number: [id number].   5/18/2023 12:31:35 PM CDT

## 2023-05-19 NOTE — TELEPHONE ENCOUNTER
Spoke with Vannesa with [facility]  orders given to draw labs at her next home health visit.  She states that it will be one day next week.

## 2023-06-14 DIAGNOSIS — E78.2 MIXED HYPERLIPIDEMIA: ICD-10-CM

## 2023-06-14 NOTE — TELEPHONE ENCOUNTER
Refill Routing Note   Medication(s) are not appropriate for processing by [facility] for the following reason(s):      Required labs outdated    [facility] action(s):  Defer Care Due:  Appointment due  Labs due          Appointments  past 12m or future 3m with PCP    Date Provider   Last Visit   9/6/2022 Dale Fulton MD   Next Visit   Visit date not found Dale Fulton MD   ED visits in past 90 days: 0        Note composed:12:29 PM 06/14/2023

## 2023-06-14 NOTE — TELEPHONE ENCOUNTER
Care Due:                  Date            Visit Type   Department     Provider  --------------------------------------------------------------------------------                                [facility]  Last Visit: 09-      FOLLOW UP    PRACTICE       Dale Fulton  Next Visit: None Scheduled  None         None Found                                                            Last  Test          Frequency    Reason                     Performed    Due Date  --------------------------------------------------------------------------------    Office Visit  12 months..  atenoloL, famotidine,      09- 09-                             lisinopriL, simvastatin..    CMP.........  12 months..  simvastatin..............  08- 08-    [facility] Embedded Care Due Messages. Reference number: [id number].   6/14/2023 9:23:03 AM CDT

## 2023-06-15 NOTE — TELEPHONE ENCOUNTER
May 19, 2023  UMER Hendrickson     8:57 AM  Note  Spoke with Vannesa with [facility]  orders given to draw labs at her next home health visit.  She states that it will be one day next week.            Call placed to [facility]. Spoke to Vannesa who states patient was discharged from their services on 4-26-23. Call placed to patient for notification of need to perform labs prior to refill of medication. Lab appointment scheduled for the date of 6-16-23. Patient agreed to appointment date, time, and location.  Location confirmed at the time of call (including address and which entrance to enter for check in).  Will send follow up message to Dr. Fulton for notification.

## 2023-06-15 NOTE — TELEPHONE ENCOUNTER
See refill note May 18, 2023.  Home health was supposed to draw labs on her last month.  We are still waiting

## 2023-06-16 ENCOUNTER — LAB VISIT (OUTPATIENT)
Dept: LAB | Facility: HOSPITAL | Age: 88
End: 2023-06-16
Attending: FAMILY MEDICINE
Payer: MEDICARE

## 2023-06-16 DIAGNOSIS — I10 ESSENTIAL HYPERTENSION: ICD-10-CM

## 2023-06-16 DIAGNOSIS — E78.49 OTHER HYPERLIPIDEMIA: ICD-10-CM

## 2023-06-16 DIAGNOSIS — E03.9 ACQUIRED HYPOTHYROIDISM: ICD-10-CM

## 2023-06-16 PROCEDURE — 80053 COMPREHEN METABOLIC PANEL: CPT | Performed by: FAMILY MEDICINE

## 2023-06-16 PROCEDURE — 84443 ASSAY THYROID STIM HORMONE: CPT | Performed by: FAMILY MEDICINE

## 2023-06-16 PROCEDURE — 36415 COLL VENOUS BLD VENIPUNCTURE: CPT | Mod: PO | Performed by: FAMILY MEDICINE

## 2023-06-16 PROCEDURE — 80061 LIPID PANEL: CPT | Performed by: FAMILY MEDICINE

## 2023-06-17 LAB
ALBUMIN SERPL BCP-MCNC: 4.2 G/DL (ref 3.5–5.2)
ALP SERPL-CCNC: 65 U/L (ref 55–135)
ALT SERPL W/O P-5'-P-CCNC: 11 U/L (ref 10–44)
ANION GAP SERPL CALC-SCNC: 7 MMOL/L (ref 8–16)
AST SERPL-CCNC: 27 U/L (ref 10–40)
BILIRUB SERPL-MCNC: 0.8 MG/DL (ref 0.1–1)
BUN SERPL-MCNC: 9 MG/DL (ref 10–30)
CALCIUM SERPL-MCNC: 10.6 MG/DL (ref 8.7–10.5)
CHLORIDE SERPL-SCNC: 103 MMOL/L (ref 95–110)
CHOLEST SERPL-MCNC: 134 MG/DL (ref 120–199)
CHOLEST/HDLC SERPL: 2 {RATIO} (ref 2–5)
CO2 SERPL-SCNC: 28 MMOL/L (ref 23–29)
CREAT SERPL-MCNC: 0.7 MG/DL (ref 0.5–1.4)
EST. GFR  (NO RACE VARIABLE): >60 ML/MIN/1.73 M^2
GLUCOSE SERPL-MCNC: 91 MG/DL (ref 70–110)
HDLC SERPL-MCNC: 66 MG/DL (ref 40–75)
HDLC SERPL: 49.3 % (ref 20–50)
LDLC SERPL CALC-MCNC: 54 MG/DL (ref 63–159)
NONHDLC SERPL-MCNC: 68 MG/DL
POTASSIUM SERPL-SCNC: 4.3 MMOL/L (ref 3.5–5.1)
PROT SERPL-MCNC: 6.6 G/DL (ref 6–8.4)
SODIUM SERPL-SCNC: 138 MMOL/L (ref 136–145)
TRIGL SERPL-MCNC: 70 MG/DL (ref 30–150)
TSH SERPL DL<=0.005 MIU/L-ACNC: 3.71 UIU/ML (ref 0.4–4)

## 2023-06-19 DIAGNOSIS — J30.9 ALLERGIC RHINITIS, UNSPECIFIED SEASONALITY, UNSPECIFIED TRIGGER: ICD-10-CM

## 2023-06-19 RX ORDER — SIMVASTATIN 20 MG/1
20 TABLET, FILM COATED ORAL NIGHTLY
Qty: 90 TABLET | Refills: 3 | Status: SHIPPED | OUTPATIENT
Start: 2023-06-19

## 2023-06-19 RX ORDER — LEVOCETIRIZINE DIHYDROCHLORIDE 5 MG/1
5 TABLET, FILM COATED ORAL NIGHTLY
Qty: 30 TABLET | Refills: 11 | Status: CANCELLED | OUTPATIENT
Start: 2023-06-19 | End: 2024-06-18

## 2023-06-19 NOTE — TELEPHONE ENCOUNTER
Simvastatin refill is already in another refill encounter. Called daughter Bertrand- appt made 10/31/23 at 9:00 with Dr. Fulton.

## 2023-06-19 NOTE — TELEPHONE ENCOUNTER
----- Message from Esther Veliz sent at 6/19/2023  9:30 AM CDT -----  Regarding: refill  Contact: patient  Type:  RX Refill Request    Who Called:  Patient  Refill or New Rx:  refill  RX Name and Strength:  Simvastatin and levocetirizine   How is the patient currently taking it? (ex. 1XDay):    Is this a 30 day or 90 day RX:    Preferred Pharmacy with phone number:      [facility] - [place], [place] - [address] [address] [facility] [address]  [place] [address]  Phone: [phone] Fax: [phone]      Local or Mail Order:    Ordering Provider:    Best Call Back Number:  [phone] (work)    Additional Information:  Please call one the refill been sent. Thanks!

## 2023-06-19 NOTE — TELEPHONE ENCOUNTER
No care due was identified.  [facility] Embedded Care Due Messages. Reference number: [id number].   6/19/2023 12:30:48 PM CDT

## 2023-06-20 ENCOUNTER — TELEPHONE (OUTPATIENT)
Dept: FAMILY MEDICINE | Facility: CLINIC | Age: 88
End: 2023-06-20
Payer: MEDICARE

## 2023-06-20 DIAGNOSIS — J30.9 ALLERGIC RHINITIS, UNSPECIFIED SEASONALITY, UNSPECIFIED TRIGGER: ICD-10-CM

## 2023-06-20 NOTE — TELEPHONE ENCOUNTER
No care due was identified.  [facility] Embedded Care Due Messages. Reference number: [id number].   6/20/2023 10:40:17 AM CDT

## 2023-06-20 NOTE — TELEPHONE ENCOUNTER
----- Message from Jeanne Boeckelman, MA sent at 6/20/2023  1:34 PM CDT -----  I called the patient in regards to her lab results, the patient states that she is taking a mutlivitamin that has 130mg of calcium its a one a day and she takes it 1 xdaily. Patient states that she is also taking vitamind d3 1 time daily

## 2023-06-20 NOTE — TELEPHONE ENCOUNTER
That is not an excessive amount of calcium.  Does she use calcium antacids, Tums or Rolaids?  How about large amounts of milk?

## 2023-06-20 NOTE — TELEPHONE ENCOUNTER
Patient has runny nose and sneezing and feeling tired- asking Dr. Fulton to take over prescribing Xyzal. Bettie Ortiz NP denied it yesterday.

## 2023-06-20 NOTE — TELEPHONE ENCOUNTER
----- Message from Alyssa Freeman sent at 6/20/2023  9:38 AM CDT -----  Contact: Patient's daughter  Type: Needs Medical Advice    Who Called:  Patient's daughter  What is this regarding?:  Pt is out of her cholesterol and another Rx and needs them refilled. Please call the daughter back about the statues of her refills.  Best Call Back Number:  [phone]  Additional Information:  Please call the patient's daughter, Ms. Thurston, back at the phone number listed above to advise. Thank you!

## 2023-06-21 RX ORDER — LEVOCETIRIZINE DIHYDROCHLORIDE 5 MG/1
5 TABLET, FILM COATED ORAL NIGHTLY
Qty: 30 TABLET | Refills: 11 | Status: SHIPPED | OUTPATIENT
Start: 2023-06-21 | End: 2024-06-20

## 2023-06-21 NOTE — TELEPHONE ENCOUNTER
"--Patient states she very rarely takes Tums, and does not use Rolaids. Patient states she does not drink milk, and is "off all dairy."  Patient states she does eat on occasion yogurt (Brand name Silk).  States this yogurt states it does not contain daily.     --Patient states she previously requested refill of Levocetirizine for allergies. States she has been out of this medication for 4 days and desperately need refill. Order noted to be pending in a separate encounter on 6-20-23.   "

## 2023-06-21 NOTE — TELEPHONE ENCOUNTER
It does not appear that calcium intake is a problem.  The Xyzal/levetiracetam was sent in to [facility].  This morning at 9:15 a.m..  We have a confirmation that they received it from the pharmacy computer.  It should be ready to .

## 2023-07-06 ENCOUNTER — TELEPHONE (OUTPATIENT)
Dept: FAMILY MEDICINE | Facility: CLINIC | Age: 88
End: 2023-07-06
Payer: MEDICARE

## 2023-07-06 NOTE — TELEPHONE ENCOUNTER
Spoke to Bertrand- advised her patient should follow up with urgent care as I have no appt available today. She says patient has been up for last 3 nights with urinary incontinency. Urgent care wants her in for lab work.

## 2023-07-06 NOTE — TELEPHONE ENCOUNTER
----- Message from Saud Ruiz sent at 7/6/2023 10:18 AM CDT -----  Regarding: advice  Contact: ELIAS CAGLE [[id number]]  Type: Needs Medical Advice  Who Called:  Bertrand, daughter    Symptoms (please be specific):  UTI    How long has patient had these symptoms:  na    Pharmacy name and phone #:    [facility] - [place], [place] - [address] [phone]  [place] [address]  Phone: [phone] Fax: [phone]      Best Call Back Number: [phone]    Additional Information: Went to  on 7/3. UC gave ABX but not helping. UC is wanting more Labs. Please call to advise.

## 2023-07-24 NOTE — TELEPHONE ENCOUNTER
----- Message from Maria Esther Garvin, Patient Care Assistant sent at 1/17/2023  8:56 AM CST -----  Contact: Home Health Nurse  Type: Needs Medical Advice    Who Called: Home Health Nurse  Best Call Back Number: [phone]  Inquiry/Question: Pt recently had a fall, no significant injuries but bruising to face. Please advise. Thank you~          
Katie [facility] states patient does currently have bruising to arms and legs, but is unsure if this is due to recent fall, as patient normally has bruising noted to extremities. States she will contact patient's daughter, and ask for her to have patient look upward to confirm patient can look upward with both eyes evenly. States she will call back with this information.   
Katie with [facility]  ([phone]) reports patient had a fall on 1-12-23. [facility] [place] [facility] patient was outside walking from her neighbor's house where she stumbled over a stump falling face forward. [facility] [place] patient stated she did not hit her face on anything, but there is bruising noted to face. [facility] [place] there are no open areas, cuts, or scrapes noted to face. [facility] [place] patient did not go to hospital at time of fall. [facility] [place] patient's daughter (Bertrand) is aware of fall and does not believe patient needs to be seen related to the fall. Please be advised.   
Return call placed to [place] with [facility]. No answer. Left message requesting return call to office.       Per Dr. Fulton  Does she have matching bruises on the back of the hands or forearms?  Often in a fall the hands or thrown up to protect the face and the face actually impact on the hands.  If she does not feel she needs to be seen we can not force her.  If the bruises around the cheek area below the eye make sure that she can look upward with both eyes evenly, an orbital blowout fracture can trap the muscles and not allow the eye to move upwards.  
take pain meds as prescribed as needed for severe pain, take laxatives with pain meds to prevent constipation

## 2023-08-04 ENCOUNTER — TELEPHONE (OUTPATIENT)
Dept: FAMILY MEDICINE | Facility: CLINIC | Age: 88
End: 2023-08-04
Payer: MEDICARE

## 2023-08-04 NOTE — TELEPHONE ENCOUNTER
Spoke with Zhanna   Last office visit 9/6/2022  Instructed she would need an appointment for an evaluation. She will schedule with another provider.  Next appointment 10/31/2023 with Dr Fulton.       ----- Message from Haley Fuchs sent at 8/4/2023  3:20 PM CDT -----  Regarding: Needs Medical Order/appt  Contact: Zhanna with [facility] at [phone]  Type: Needs Medical Order  Who Called:  Zhanna with [facility] at [phone] at fax #[phone]    Additional Information: Daughter states that patient was seen at the clinic, but is requesting to have home health reinstated. Order is necessary to reinstate. Patient would also need a sooner appointment than October with pcp. Please call and advise. Thank you

## 2023-08-21 DIAGNOSIS — E03.9 ACQUIRED HYPOTHYROIDISM: ICD-10-CM

## 2023-08-21 RX ORDER — LEVOTHYROXINE SODIUM 112 UG/1
56 TABLET ORAL DAILY
Qty: 45 TABLET | Refills: 0 | Status: SHIPPED | OUTPATIENT
Start: 2023-08-21 | End: 2023-10-31 | Stop reason: SDUPTHER

## 2023-08-21 NOTE — TELEPHONE ENCOUNTER
No care due was identified.  [facility] Embedded Care Due Messages. Reference number: [id number].   8/21/2023 9:20:27 AM CDT

## 2023-08-22 NOTE — TELEPHONE ENCOUNTER
Refill Decision Note   [place]  is requesting a refill authorization.  Brief Assessment and Rationale for Refill:  Approve     Medication Therapy Plan:       Medication Reconciliation Completed: No    Comments:     No Care Gaps recommended.     Note composed:7:32 PM 08/21/2023

## 2023-10-31 ENCOUNTER — OFFICE VISIT (OUTPATIENT)
Dept: FAMILY MEDICINE | Facility: CLINIC | Age: 88
End: 2023-10-31
Attending: FAMILY MEDICINE
Payer: MEDICARE

## 2023-10-31 VITALS
WEIGHT: 133.06 LBS | HEART RATE: 103 BPM | OXYGEN SATURATION: 95 % | SYSTOLIC BLOOD PRESSURE: 138 MMHG | BODY MASS INDEX: 22.71 KG/M2 | RESPIRATION RATE: 17 BRPM | TEMPERATURE: 99 F | DIASTOLIC BLOOD PRESSURE: 78 MMHG | HEIGHT: 64 IN

## 2023-10-31 DIAGNOSIS — E78.49 OTHER HYPERLIPIDEMIA: ICD-10-CM

## 2023-10-31 DIAGNOSIS — I48.20 CHRONIC ATRIAL FIBRILLATION: ICD-10-CM

## 2023-10-31 DIAGNOSIS — E03.9 ACQUIRED HYPOTHYROIDISM: ICD-10-CM

## 2023-10-31 DIAGNOSIS — B02.9 HERPES ZOSTER WITHOUT COMPLICATION: ICD-10-CM

## 2023-10-31 DIAGNOSIS — I10 ESSENTIAL HYPERTENSION: Primary | ICD-10-CM

## 2023-10-31 DIAGNOSIS — D70.8 OTHER NEUTROPENIA: ICD-10-CM

## 2023-10-31 DIAGNOSIS — M81.8 OTHER OSTEOPOROSIS WITHOUT CURRENT PATHOLOGICAL FRACTURE: ICD-10-CM

## 2023-10-31 DIAGNOSIS — D69.6 THROMBOCYTOPENIA, UNSPECIFIED: ICD-10-CM

## 2023-10-31 DIAGNOSIS — E21.0 PRIMARY HYPERPARATHYROIDISM: ICD-10-CM

## 2023-10-31 PROCEDURE — 99214 PR OFFICE/OUTPT VISIT, EST, LEVL IV, 30-39 MIN: ICD-10-PCS | Mod: S$PBB,,, | Performed by: FAMILY MEDICINE

## 2023-10-31 PROCEDURE — G0008 ADMIN INFLUENZA VIRUS VAC: HCPCS | Mod: PBBFAC,PN

## 2023-10-31 PROCEDURE — 99999 PR PBB SHADOW E&M-EST. PATIENT-LVL V: CPT | Mod: PBBFAC,,, | Performed by: FAMILY MEDICINE

## 2023-10-31 PROCEDURE — 99999 PR PBB SHADOW E&M-EST. PATIENT-LVL V: ICD-10-PCS | Mod: PBBFAC,,, | Performed by: FAMILY MEDICINE

## 2023-10-31 PROCEDURE — 99214 OFFICE O/P EST MOD 30 MIN: CPT | Mod: S$PBB,,, | Performed by: FAMILY MEDICINE

## 2023-10-31 PROCEDURE — 99215 OFFICE O/P EST HI 40 MIN: CPT | Mod: PBBFAC,PN,25 | Performed by: FAMILY MEDICINE

## 2023-10-31 PROCEDURE — 99999PBSHW FLU VACCINE - QUADRIVALENT - ADJUVANTED: ICD-10-PCS | Mod: PBBFAC,,,

## 2023-10-31 PROCEDURE — 99999PBSHW FLU VACCINE - QUADRIVALENT - ADJUVANTED: Mod: PBBFAC,,,

## 2023-10-31 RX ORDER — VALACYCLOVIR HYDROCHLORIDE 1 G/1
1000 TABLET, FILM COATED ORAL 3 TIMES DAILY
Qty: 21 TABLET | Refills: 0 | Status: SHIPPED | OUTPATIENT
Start: 2023-10-31 | End: 2023-11-07

## 2023-10-31 RX ORDER — LISINOPRIL 40 MG/1
40 TABLET ORAL DAILY
Qty: 90 TABLET | Refills: 3 | Status: SHIPPED | OUTPATIENT
Start: 2023-10-31

## 2023-10-31 RX ORDER — LEVOTHYROXINE SODIUM 112 UG/1
56 TABLET ORAL DAILY
Qty: 45 TABLET | Refills: 3 | Status: SHIPPED | OUTPATIENT
Start: 2023-10-31

## 2023-10-31 RX ORDER — OMEPRAZOLE 20 MG/1
1 CAPSULE, DELAYED RELEASE ORAL DAILY
COMMUNITY

## 2023-10-31 RX ORDER — AMLODIPINE BESYLATE 2.5 MG/1
2.5 TABLET ORAL 2 TIMES DAILY
Qty: 180 TABLET | Refills: 3
Start: 2023-10-31

## 2023-10-31 RX ORDER — NITROFURANTOIN 25; 75 MG/1; MG/1
100 CAPSULE ORAL EVERY 12 HOURS
COMMUNITY
Start: 2023-10-26 | End: 2024-03-27

## 2023-10-31 RX ORDER — MINERAL OIL
180 ENEMA (ML) RECTAL
COMMUNITY
Start: 2023-10-10 | End: 2023-12-09

## 2023-10-31 RX ORDER — ATENOLOL 50 MG/1
50 TABLET ORAL 2 TIMES DAILY
Qty: 180 TABLET | Refills: 3 | Status: SHIPPED | OUTPATIENT
Start: 2023-10-31

## 2023-10-31 RX ORDER — GUAIFENESIN 600 MG/1
1200 TABLET, EXTENDED RELEASE ORAL 2 TIMES DAILY
COMMUNITY

## 2024-01-11 DIAGNOSIS — Z00.00 ENCOUNTER FOR MEDICARE ANNUAL WELLNESS EXAM: ICD-10-CM

## 2024-02-05 ENCOUNTER — OFFICE VISIT (OUTPATIENT)
Dept: URGENT CARE | Facility: CLINIC | Age: 89
End: 2024-02-05
Payer: MEDICARE

## 2024-02-05 VITALS
HEIGHT: 64 IN | TEMPERATURE: 98 F | DIASTOLIC BLOOD PRESSURE: 81 MMHG | RESPIRATION RATE: 20 BRPM | BODY MASS INDEX: 22.71 KG/M2 | HEART RATE: 72 BPM | OXYGEN SATURATION: 97 % | WEIGHT: 133 LBS | SYSTOLIC BLOOD PRESSURE: 178 MMHG

## 2024-02-05 DIAGNOSIS — N39.0 URINARY TRACT INFECTION WITHOUT HEMATURIA, SITE UNSPECIFIED: ICD-10-CM

## 2024-02-05 DIAGNOSIS — R35.0 FREQUENCY OF URINATION: Primary | ICD-10-CM

## 2024-02-05 LAB
BILIRUB UR QL STRIP: NEGATIVE
GLUCOSE UR QL STRIP: NEGATIVE
KETONES UR QL STRIP: NEGATIVE
LEUKOCYTE ESTERASE UR QL STRIP: POSITIVE
PH, POC UA: 6
POC BLOOD, URINE: POSITIVE
POC NITRATES, URINE: NEGATIVE
PROT UR QL STRIP: POSITIVE
SP GR UR STRIP: 1.01 (ref 1–1.03)
UROBILINOGEN UR STRIP-ACNC: 0.2 (ref 0.1–1.1)

## 2024-02-05 PROCEDURE — 99214 OFFICE O/P EST MOD 30 MIN: CPT | Mod: S$GLB,,, | Performed by: NURSE PRACTITIONER

## 2024-02-05 PROCEDURE — 81003 URINALYSIS AUTO W/O SCOPE: CPT | Mod: QW,S$GLB,, | Performed by: NURSE PRACTITIONER

## 2024-02-05 RX ORDER — DOXYCYCLINE HYCLATE 100 MG
100 TABLET ORAL 2 TIMES DAILY
Qty: 20 TABLET | Refills: 0 | Status: SHIPPED | OUTPATIENT
Start: 2024-02-05 | End: 2024-03-27

## 2024-02-05 NOTE — PROGRESS NOTES
"Subjective:      Patient ID: Ama Lang is a 89 y.o. female.    Vitals:  height is 5' 4" (1.626 m) and weight is 60.3 kg (133 lb). Her temperature is 97.9 °F (36.6 °C). Her blood pressure is 178/81 (abnormal) and her pulse is 72. Her respiration is 20 and oxygen saturation is 97%.     Chief Complaint: Urinary Tract Infection    UTI symptoms.  Patient notes recent onset.  Notes has had similar problem previously.  Reviewed with her the history of urinary tract infection in December of this year and previous.  Patient notes some sweats.  No fever chills body aches.  No nausea vomiting.  Does note some pelvic discomfort lower back pain.    Urinary Tract Infection   Episode onset: x's 3 days ago. The problem has been gradually worsening. She is Not sexually active. Associated symptoms include flank pain, nausea and urgency. She has tried nothing for the symptoms. Her past medical history is significant for recurrent UTIs.       Gastrointestinal:  Positive for nausea.   Genitourinary:  Positive for urgency and flank pain.      Objective:     Physical Exam   Pulmonary/Chest: Effort normal and breath sounds normal.   Abdominal: Normal appearance. Soft. flat abdomen There is no left CVA tenderness and no right CVA tenderness.   Neurological: She is alert.   Vitals reviewed.      Assessment:     1. Frequency of urination    2. Urinary tract infection without hematuria, site unspecified        Plan:       Frequency of urination  -     POCT Urinalysis, Dipstick, Automated, W/O Scope  -     Urine culture    Urinary tract infection without hematuria, site unspecified  -     Urine culture    Other orders  -     doxycycline (VIBRA-TABS) 100 MG tablet; Take 1 tablet (100 mg total) by mouth 2 (two) times daily.  Dispense: 20 tablet; Refill: 0                Point of service urinalysis UTI per my interpretation.  Prescription  Patient has seen Urology and Gynecology previously.  Has had rounds of antibiotics in the past.  No " doxycycline in the last 90 days.

## 2024-02-07 LAB
BACTERIA UR CULT: NO GROWTH
BACTERIA UR CULT: NORMAL

## 2024-03-17 ENCOUNTER — CLINICAL SUPPORT (OUTPATIENT)
Dept: URGENT CARE | Facility: CLINIC | Age: 89
End: 2024-03-17
Payer: MEDICARE

## 2024-03-17 ENCOUNTER — TELEPHONE (OUTPATIENT)
Dept: URGENT CARE | Facility: CLINIC | Age: 89
End: 2024-03-17

## 2024-03-17 VITALS
WEIGHT: 133 LBS | HEART RATE: 93 BPM | SYSTOLIC BLOOD PRESSURE: 158 MMHG | OXYGEN SATURATION: 96 % | RESPIRATION RATE: 19 BRPM | TEMPERATURE: 99 F | BODY MASS INDEX: 22.71 KG/M2 | DIASTOLIC BLOOD PRESSURE: 88 MMHG | HEIGHT: 64 IN

## 2024-03-17 DIAGNOSIS — N30.01 ACUTE CYSTITIS WITH HEMATURIA: Primary | ICD-10-CM

## 2024-03-17 DIAGNOSIS — N39.44 NOCTURNAL ENURESIS: ICD-10-CM

## 2024-03-17 DIAGNOSIS — R39.9 UTI SYMPTOMS: ICD-10-CM

## 2024-03-17 DIAGNOSIS — N39.0 RECURRENT UTI (URINARY TRACT INFECTION): ICD-10-CM

## 2024-03-17 LAB
BILIRUB UR QL STRIP: NEGATIVE
GLUCOSE UR QL STRIP: NEGATIVE
KETONES UR QL STRIP: NEGATIVE
LEUKOCYTE ESTERASE UR QL STRIP: POSITIVE
PH, POC UA: 6.5
POC BLOOD, URINE: POSITIVE
POC NITRATES, URINE: NEGATIVE
PROT UR QL STRIP: NEGATIVE
SP GR UR STRIP: 1 (ref 1–1.03)
UROBILINOGEN UR STRIP-ACNC: 0.2 (ref 0.1–1.1)

## 2024-03-17 PROCEDURE — 81003 URINALYSIS AUTO W/O SCOPE: CPT | Mod: QW,S$GLB,,

## 2024-03-17 PROCEDURE — 99214 OFFICE O/P EST MOD 30 MIN: CPT | Mod: S$GLB,,,

## 2024-03-17 RX ORDER — CEFUROXIME AXETIL 500 MG/1
500 TABLET ORAL 2 TIMES DAILY
Qty: 14 TABLET | Refills: 0 | Status: SHIPPED | OUTPATIENT
Start: 2024-03-17 | End: 2024-03-24

## 2024-03-17 NOTE — TELEPHONE ENCOUNTER
Pt was seen at  today for recurrent UTIs. Is there any way for the patient to get a close follow up with Dr. Fulton? I see that her next appointment isn't until 11/2024. Thank you so much !

## 2024-03-17 NOTE — PROGRESS NOTES
"Subjective:      Patient ID: Ama Lang is a 89 y.o. female.    Vitals:  height is 5' 4" (1.626 m) and weight is 60.3 kg (133 lb). Her oral temperature is 98.5 °F (36.9 °C). Her blood pressure is 158/88 (abnormal) and her pulse is 93. Her respiration is 19 and oxygen saturation is 96%.     Chief Complaint: Urinary Tract Infection    Urinary Tract Infection   This is a recurrent problem. The current episode started in the past 7 days. The problem occurs every urination. The problem has been gradually worsening. The quality of the pain is described as shooting, burning and aching. The pain is at a severity of 7/10. The pain is moderate. There has been no fever. Associated symptoms include chills, flank pain, frequency, hematuria, hesitancy, nausea and urgency. Associated symptoms comments: Itchy skin . She has tried nothing for the symptoms. Her past medical history is significant for recurrent UTIs.       Constitution: Positive for chills. Negative for fatigue and fever.   HENT: Negative.     Neck: neck negative.   Cardiovascular: Negative.    Respiratory: Negative.     Gastrointestinal:  Positive for nausea.   Genitourinary:  Positive for frequency, urgency, flank pain, bladder incontinence and hematuria. Negative for urine decreased.   Neurological: Negative.    Psychiatric/Behavioral: Negative.        Objective:     Physical Exam   Constitutional: She is oriented to person, place, and time. She appears well-developed.   HENT:   Head: Normocephalic and atraumatic.   Ears:   Right Ear: External ear normal.   Left Ear: External ear normal.   Nose: Nose normal. No nasal deformity. No epistaxis.   Mouth/Throat: Oropharynx is clear and moist and mucous membranes are normal. Mucous membranes are moist.   Eyes: Conjunctivae and lids are normal.   Neck: Trachea normal and phonation normal. Neck supple.   Cardiovascular: Normal rate.   Pulmonary/Chest: Effort normal. No respiratory distress.   Abdominal: Normal " appearance and bowel sounds are normal. She exhibits no distension. Soft. There is no abdominal tenderness.   Neurological: She is alert and oriented to person, place, and time. She displays no weakness.   Skin: Skin is warm, dry and intact.   Psychiatric: Her speech is normal and behavior is normal. Mood, judgment and thought content normal.   Nursing note and vitals reviewed.      Assessment:     1. Acute cystitis with hematuria    2. UTI symptoms    3. Recurrent UTI (urinary tract infection)    4. Nocturnal enuresis        Plan:       Acute cystitis with hematuria  -     cefUROXime (CEFTIN) 500 MG tablet; Take 1 tablet (500 mg total) by mouth 2 (two) times daily. for 7 days  Dispense: 14 tablet; Refill: 0    UTI symptoms  -     POCT Urinalysis, Dipstick, Automated, W/O Scope  -     CULTURE, URINE    Recurrent UTI (urinary tract infection)  -     CULTURE, URINE    Nocturnal enuresis      UA: abnormal  Culture sent    Discussed medication with patient and daughter (kenny) who acknowledges understanding and is agreeable to POC. Follow up with primary care. Increase fluid intake. Red flags for ER discussed.

## 2024-03-22 LAB
BACTERIA UR CULT: ABNORMAL
BACTERIA UR CULT: ABNORMAL
OTHER ANTIBIOTIC SUSC ISLT: ABNORMAL

## 2024-03-27 ENCOUNTER — OFFICE VISIT (OUTPATIENT)
Dept: FAMILY MEDICINE | Facility: CLINIC | Age: 89
End: 2024-03-27
Payer: MEDICARE

## 2024-03-27 VITALS
DIASTOLIC BLOOD PRESSURE: 80 MMHG | SYSTOLIC BLOOD PRESSURE: 138 MMHG | BODY MASS INDEX: 23.14 KG/M2 | HEIGHT: 64 IN | OXYGEN SATURATION: 94 % | HEART RATE: 71 BPM | TEMPERATURE: 98 F | WEIGHT: 135.56 LBS

## 2024-03-27 DIAGNOSIS — R35.0 URINARY FREQUENCY: Primary | ICD-10-CM

## 2024-03-27 LAB
BILIRUBIN, UA POC OHS: NEGATIVE
BLOOD, UA POC OHS: NEGATIVE
CLARITY, UA POC OHS: CLEAR
COLOR, UA POC OHS: YELLOW
GLUCOSE, UA POC OHS: NEGATIVE
KETONES, UA POC OHS: NEGATIVE
LEUKOCYTES, UA POC OHS: NEGATIVE
NITRITE, UA POC OHS: NEGATIVE
PH, UA POC OHS: 7
PROTEIN, UA POC OHS: NEGATIVE
SPECIFIC GRAVITY, UA POC OHS: 1.02
UROBILINOGEN, UA POC OHS: 0.2

## 2024-03-27 PROCEDURE — 99215 OFFICE O/P EST HI 40 MIN: CPT | Mod: PBBFAC,PO | Performed by: FAMILY MEDICINE

## 2024-03-27 PROCEDURE — 81003 URINALYSIS AUTO W/O SCOPE: CPT | Mod: PBBFAC,PO | Performed by: FAMILY MEDICINE

## 2024-03-27 PROCEDURE — 99999PBSHW POCT URINALYSIS(INSTRUMENT): Mod: PBBFAC,,,

## 2024-03-27 PROCEDURE — 99213 OFFICE O/P EST LOW 20 MIN: CPT | Mod: S$PBB,,, | Performed by: FAMILY MEDICINE

## 2024-03-27 PROCEDURE — 99999 PR PBB SHADOW E&M-EST. PATIENT-LVL V: CPT | Mod: PBBFAC,,, | Performed by: FAMILY MEDICINE

## 2024-03-27 NOTE — PROGRESS NOTES
Subjective:       Patient ID: Ama Lang is a 89 y.o. female.    Chief Complaint: No chief complaint on file.    New to me patient here for UC visit.  Urine frequency yesterday; non e today; no pain or fever or flank pain.  Two recent UTI's - finished Abx about 5-6 days ago    Review of Systems   Constitutional:  Negative for fever.   Respiratory:  Negative for shortness of breath.    Cardiovascular:  Negative for chest pain.   Gastrointestinal:  Negative for abdominal pain and nausea.   Skin:  Negative for rash.   All other systems reviewed and are negative.      Objective:      Physical Exam  Constitutional:       General: She is not in acute distress.     Appearance: She is well-developed.   Cardiovascular:      Rate and Rhythm: Normal rate. Rhythm irregularly irregular.      Heart sounds: Murmur heard.      Systolic murmur is present with a grade of 1/6.   Pulmonary:      Effort: Pulmonary effort is normal.      Breath sounds: Normal breath sounds.   Abdominal:      Tenderness: There is no abdominal tenderness. There is no right CVA tenderness or left CVA tenderness.         Assessment:       1. Urinary frequency        Plan:       Urinary frequency  -     POCT Urinalysis(Instrument) - totally normal.      Patient Instructions   Push fluids intake.  Drink plenty of water.     Contact your PCP if any worsening or for any new concerns as we discussed.

## 2024-04-26 ENCOUNTER — HOSPITAL ENCOUNTER (EMERGENCY)
Facility: HOSPITAL | Age: 89
Discharge: HOME | End: 2024-04-26
Attending: STUDENT IN AN ORGANIZED HEALTH CARE EDUCATION/TRAINING PROGRAM
Payer: MEDICARE

## 2024-04-26 VITALS
BODY MASS INDEX: 23.05 KG/M2 | TEMPERATURE: 98 F | OXYGEN SATURATION: 97 % | SYSTOLIC BLOOD PRESSURE: 148 MMHG | WEIGHT: 135 LBS | RESPIRATION RATE: 16 BRPM | HEART RATE: 88 BPM | DIASTOLIC BLOOD PRESSURE: 77 MMHG | HEIGHT: 64 IN
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
## 2024-04-26 DIAGNOSIS — S09.90XA INJURY OF HEAD, INITIAL ENCOUNTER: ICD-10-CM

## 2024-04-26 DIAGNOSIS — S22.42XA CLOSED FRACTURE OF MULTIPLE RIBS OF LEFT SIDE, INITIAL ENCOUNTER: ICD-10-CM

## 2024-04-26 PROCEDURE — 90471 IMMUNIZATION ADMIN: CPT | Performed by: STUDENT IN AN ORGANIZED HEALTH CARE EDUCATION/TRAINING PROGRAM

## 2024-04-26 PROCEDURE — 99285 EMERGENCY DEPT VISIT HI MDM: CPT | Mod: 25

## 2024-04-26 PROCEDURE — 90715 TDAP VACCINE 7 YRS/> IM: CPT | Performed by: STUDENT IN AN ORGANIZED HEALTH CARE EDUCATION/TRAINING PROGRAM

## 2024-04-26 PROCEDURE — 63600175 PHARM REV CODE 636 W HCPCS: Performed by: STUDENT IN AN ORGANIZED HEALTH CARE EDUCATION/TRAINING PROGRAM

## 2024-04-26 RX ADMIN — CLOSTRIDIUM TETANI TOXOID ANTIGEN (FORMALDEHYDE INACTIVATED), CORYNEBACTERIUM DIPHTHERIAE TOXOID ANTIGEN (FORMALDEHYDE INACTIVATED), BORDETELLA PERTUSSIS TOXOID ANTIGEN (GLUTARALDEHYDE INACTIVATED), BORDETELLA PERTUSSIS FILAMENTOUS HEMAGGLUTININ ANTIGEN (FORMALDEHYDE INACTIVATED), BORDETELLA PERTUSSIS PERTACTIN ANTIGEN, AND BORDETELLA PERTUSSIS FIMBRIAE 2/3 ANTIGEN 0.5 ML: 5; 2; 2.5; 5; 3; 5 INJECTION, SUSPENSION INTRAMUSCULAR at 04:04

## 2024-04-26 NOTE — ED PROVIDER NOTES
Encounter Date: 4/26/2024       History     Chief Complaint   Patient presents with    Fall     Fell two day ago, has visible hematoma on left side of face and having pain on left side (rib area) and left leg and arm - patient is on blood thinners       Patient is a 89-year-old female with history of hypertension, hyperlipidemia, hypothyroidism, AFib on reduced dose Eliquis presenting with left-sided rib pain and bruising to the face 2 days following mechanical fall.  Patient tripped on a step getting into her house and landed on her left side of face and chest.  Since that time she has been ambulatory but does report some pain.  She sustained several lacerations/abrasions to her skin that redressed with Band-Aids by a home nurse.  Patient reports chest pain on inspiration and tenderness to her left side.  Denies any loss of consciousness, confusion, fever, headache, change in vision, nausea, vomiting.    HPI  Review of patient's allergies indicates:   Allergen Reactions    Montelukast Other (See Comments)     Depleted sodium     Ciprofloxacin     Hydrochlorothiazide Other (See Comments)     Low sodium     Past Medical History:   Diagnosis Date    *Atrial fibrillation     Dr. Mullen    Cancer     skin cancer arms     Cataract     Diverticulitis     Hyperlipidemia     Hypertension     Hypothyroidism      Past Surgical History:   Procedure Laterality Date    COLONOSCOPY  12/18/2003    Dr. Santoyo    CYSTOSCOPY  2008    Dr. Nguyen    CYSTOSCOPY N/A 1/20/2021    Procedure: CYSTOSCOPY;  Surgeon: Kuhshbu Robbins MD;  Location: [facility];  Service: Urology;  Laterality: N/A;    DILATION AND CURETTAGE OF UTERUS      EYE SURGERY      skin cancer bilateral arms  08/2019    Dr Soler skin cancer both arms     TUBAL LIGATION       Family History   Problem Relation Name Age of Onset    Cancer Mother          ovarian    Heart disease Father      Cerritos's disease Father      Macular degeneration Father      Heart disease  Brother      Cancer Sister          blood    Cancer Brother      Comanche's disease Daughter 1     Early death Daughter 1     Arthritis Sister          spine    Carpal tunnel syndrome Son      Hypertension Son      Fibromyalgia Daughter 3     Comanche's disease Daughter 3     Arthritis Daughter 3         x2    Kidney cancer Neg Hx      Prostate cancer Neg Hx      Urolithiasis Neg Hx       Social History     Tobacco Use    Smoking status: Never    Smokeless tobacco: Never   Substance Use Topics    Alcohol use: No    Drug use: No     Review of Systems  As noted above  Physical Exam     Initial Vitals [04/26/24 1146]   BP Pulse Resp Temp SpO2   126/74 90 18 97.7 °F (36.5 °C) 95 %      MAP       --         Physical Exam    Constitutional: She appears well-developed and well-nourished.   HENT:   Hematoma to the left side of the face.   Eyes: Conjunctivae and EOM are normal. Pupils are equal, round, and reactive to light.   Neck:   No midline neck tenderness.  Full range of motion is present.   Normal range of motion.  Cardiovascular:  Normal rate.           No murmur heard.  Pulmonary/Chest: Breath sounds normal. No respiratory distress. She has no wheezes. She has no rales.   Pain noted on inspiration.  Chest wall tenderness to the left chest.   Abdominal: Abdomen is soft. She exhibits no distension. There is no abdominal tenderness.   Musculoskeletal:         General: No tenderness or edema. Normal range of motion.      Cervical back: Normal range of motion.     Neurological: She is alert and oriented to person, place, and time.   Skin: Capillary refill takes less than 2 seconds.   Abrasion to left anterior knee, left elbow, right wrist.  Wounds dressed with Steri-Strips and well approximated.         ED Course   Procedures  Labs Reviewed - No data to display       Imaging Results              XR Ribs Min 3 views w/PA Chest Left (Final result)  Result time 04/26/24 13:43:24      Final result by Tristna De Leon,  MD (04/26/24 13:43:24)                   Impression:      1.  No acute cardiac or pulmonary process.    2.  Findings suggestive of hairline nondisplaced left-sided rib fractures anterior laterally.      Electronically signed by: Tristan De Leon  Date:    04/26/2024  Time:    13:43               Narrative:    CLINICAL HISTORY:  ([id number])89 y/o  (2/11/1928) F    fall;    TECHNIQUE:  (A#[id number], exam time 4/26/2024 13:29)    XR RIBS MIN 3 VIEWS W/ PA CHEST LEFT [id number]    COMPARISON:  Radiograph from 08/26/2022.    FINDINGS:  The lungs are clear. Costophrenic angles are seen without effusion. No pneumothorax is identified. The cardiac silhouette is moderately enlarged. Atheromatous calcifications are seen at the aortic arch. There is an indentation to the left 5-7 ribs anteriorly and 9th rib laterally suggesting a nondisplaced rib fracture.  The visualized upper abdomen is unremarkable.                                       CT Head Without Contrast (Final result)  Result time 04/26/24 12:47:47      Final result by Huy Posadas MD (04/26/24 12:47:47)                   Impression:      No CT evidence of acute intracranial pathology.    Mild white matter microangiopathic changes.      Electronically signed by: Huy Posadas  Date:    04/26/2024  Time:    12:47               Narrative:    EXAMINATION:  CT HEAD WITHOUT CONTRAST    CLINICAL HISTORY:  Head trauma, minor (Age >= 65y);    TECHNIQUE:  CMS Mandated Quality Data-CT Radiation Dose-436    All CT scans at this facility dose modulation, iterative reconstruction, and or weight-based dosing when appropriate to reduce radiation dose to as low as reasonably achievable.    COMPARISON:  08/26/2022    FINDINGS:  Negative for acute intracranial hemorrhage, midline shift, or mass effect.  Ventricles and sulci are normal in size and stable compared to prior.  Falcine calcification is evident.  Mild periventricular white matter hypoattenuation consistent with  microangiopathic change.  Cerebellar hemispheres and brainstem are unremarkable.  Atherosclerotic calcification of intracranial carotid and vertebral arteries.    No calvarial lesion or fracture.  Small volume mastoid fluid bilaterally.  Visualized paranasal sinuses are clear.                                       Medications   Tdap vaccine injection 0.5 mL (has no administration in time range)     Medical Decision Making  89-year-old female with history of AFib on Eliquis presenting left-sided rib pain and hematoma to the face after fall 2 days ago.  Currently ambulatory and at baseline.  Denies headache, confusion, nausea, vomiting.  Vital signs here are stable.  Exam as above without any evidence of neuro deficits.  Differential includes intracranial hemorrhage, subdural hematoma, rib fracture, hemothorax, pneumothorax.  CT head without any evidence of intracranial hemorrhage.  X-ray with rib series shows possible fractures to multiple left ribs.  Lungs are otherwise clear without pneumothorax or hemothorax appreciated.  Patient with no increased work of breathing on exam with good oxygen saturation.  Wounds appear clean and dry.  Will place Band-Aid on left elbow wound.  Tdap was updated.  Provided incentive spirometer.  Strict return precautions were provided including fever, increased work of breathing, any other concerns.Patient will take Tylenol and ibuprofen for pain control.    Maverick Pradhan MD  Emergency Medicine      Risk  Prescription drug management.                                      Clinical Impression:  Final diagnoses:  [W19.XXXA] Fall, initial encounter (Primary)  [S09.90XA] Injury of head, initial encounter  [S22.42XA] Closed fracture of multiple ribs of left side, initial encounter          ED Disposition Condition    Discharge Stable          ED Prescriptions    None       Follow-up Information       Follow up With Specialties Details Why Contact Info Additional Information    [place]  [facility] - Emergency Dept Emergency Medicine  As needed, If symptoms worsen including fever, altered mental status, any other concerns [address] [address]  [phone] 1st floor    Dale Fulton MD Family Medicine   [address]  [place] [address]  [phone]       Dale Fulton MD Family Medicine In 3 days For wound reassessment and routine follow-up [address]  [facility] [address]  [phone]                Maverick Pradhan MD  04/26/24 1611

## 2024-04-29 ENCOUNTER — TELEPHONE (OUTPATIENT)
Dept: FAMILY MEDICINE | Facility: CLINIC | Age: 89
End: 2024-04-29
Payer: MEDICARE

## 2024-06-13 DIAGNOSIS — E78.2 MIXED HYPERLIPIDEMIA: ICD-10-CM

## 2024-06-13 DIAGNOSIS — J30.9 ALLERGIC RHINITIS, UNSPECIFIED SEASONALITY, UNSPECIFIED TRIGGER: ICD-10-CM

## 2024-06-13 RX ORDER — LEVOCETIRIZINE DIHYDROCHLORIDE 5 MG/1
5 TABLET, FILM COATED ORAL NIGHTLY
Qty: 90 TABLET | Refills: 1 | Status: SHIPPED | OUTPATIENT
Start: 2024-06-13

## 2024-06-13 RX ORDER — SIMVASTATIN 20 MG/1
20 TABLET, FILM COATED ORAL NIGHTLY
Qty: 90 TABLET | Refills: 0 | Status: SHIPPED | OUTPATIENT
Start: 2024-06-13

## 2024-06-13 NOTE — TELEPHONE ENCOUNTER
Refill Routing Note   Medication(s) are not appropriate for processing by [facility] for the following reason(s):        Required labs outdated    [facility] action(s):  Defer  Approve   Requires labs : Yes      Medication Therapy Plan: Acute care/admission documentation reviewed. No change in therapy. Ok to approve. FOV 11/4/24    Pharmacist review requested: Yes   Extended chart review required: Yes     Appointments  past 12m or future 3m with PCP    Date Provider   Last Visit   10/31/2023 Dale Fulton MD   Next Visit   11/1/2024 Dale Fulton MD   ED visits in past 90 days: 1        Note composed:3:27 PM 06/13/2024

## 2024-06-13 NOTE — TELEPHONE ENCOUNTER
Care Due:                  Date            Visit Type   Department     Provider  --------------------------------------------------------------------------------                                SAME DAY -                              ESTABLISHED   [facility]  Last Visit: 03-      PATIENT      MEDICINE       Dale Michelle                              EP -                              PRIMARY      [facility]  Next Visit: 11-      CARE ([facility])   PRACTICE       Dale Fulton                                                            Last  Test          Frequency    Reason                     Performed    Due Date  --------------------------------------------------------------------------------    CBC.........  12 months..  valACYclovir.............  11- 11-    CMP.........  12 months..  lisinopriL, simvastatin,   06- 06-                             valACYclovir.............    Lipid Panel.  12 months..  simvastatin..............  06- 06-    TSH.........  12 months..  levothyroxine............  06- 06-    [facility] Embedded Care Due Messages. Reference number: [id number].   6/13/2024 9:23:40 AM CDT

## 2024-06-13 NOTE — TELEPHONE ENCOUNTER
Refill Routing Note   Medication(s) are not appropriate for processing by [facility] for the following reason(s):        Required labs outdated: Zocor  Drug-drug interaction: Xyzal    [facility] action(s):  Defer     Requires labs : Yes      Medication Therapy Plan: Duplicate Therapy: fexofenadine, levocetirizine; Acute care/admission documentation reviewed. No change in therapy. Ok to approve.    Pharmacist review requested: Yes   Extended chart review required: Yes     Appointments  past 12m or future 3m with PCP    Date Provider   Last Visit   10/31/2023 Dale Fulton MD   Next Visit   11/1/2024 Dale Fulton MD   ED visits in past 90 days: 1        Note composed:10:34 AM 06/13/2024

## 2024-07-30 ENCOUNTER — LAB VISIT (OUTPATIENT)
Dept: LAB | Facility: HOSPITAL | Age: 89
End: 2024-07-30
Attending: NURSE PRACTITIONER
Payer: MEDICARE

## 2024-07-30 DIAGNOSIS — I48.20 CHRONIC A-FIB: ICD-10-CM

## 2024-07-30 DIAGNOSIS — I10 ESSENTIAL HYPERTENSION: Primary | ICD-10-CM

## 2024-07-30 DIAGNOSIS — E78.00 PURE HYPERCHOLESTEROLEMIA: ICD-10-CM

## 2024-07-30 LAB
ALBUMIN SERPL BCP-MCNC: 4 G/DL (ref 3.5–5.2)
ALP SERPL-CCNC: 75 U/L (ref 55–135)
ALT SERPL W/O P-5'-P-CCNC: 11 U/L (ref 10–44)
ANION GAP SERPL CALC-SCNC: 8 MMOL/L (ref 8–16)
AST SERPL-CCNC: 22 U/L (ref 10–40)
BASOPHILS # BLD AUTO: 0.05 K/UL (ref 0–0.2)
BASOPHILS NFR BLD: 1.9 % (ref 0–1.9)
BILIRUB SERPL-MCNC: 0.8 MG/DL (ref 0.1–1)
BUN SERPL-MCNC: 13 MG/DL (ref 10–30)
CALCIUM SERPL-MCNC: 10.2 MG/DL (ref 8.7–10.5)
CHLORIDE SERPL-SCNC: 103 MMOL/L (ref 95–110)
CHOLEST SERPL-MCNC: 123 MG/DL (ref 120–199)
CHOLEST/HDLC SERPL: 2.1 {RATIO} (ref 2–5)
CO2 SERPL-SCNC: 26 MMOL/L (ref 23–29)
CREAT SERPL-MCNC: 0.7 MG/DL (ref 0.5–1.4)
DIFFERENTIAL METHOD BLD: ABNORMAL
EOSINOPHIL # BLD AUTO: 0.1 K/UL (ref 0–0.5)
EOSINOPHIL NFR BLD: 2.7 % (ref 0–8)
ERYTHROCYTE [DISTWIDTH] IN BLOOD BY AUTOMATED COUNT: 14.7 % (ref 11.5–14.5)
EST. GFR  (NO RACE VARIABLE): >60 ML/MIN/1.73 M^2
GLUCOSE SERPL-MCNC: 97 MG/DL (ref 70–110)
HCT VFR BLD AUTO: 30.3 % (ref 37–48.5)
HDLC SERPL-MCNC: 60 MG/DL (ref 40–75)
HDLC SERPL: 48.8 % (ref 20–50)
HGB BLD-MCNC: 10 G/DL (ref 12–16)
IMM GRANULOCYTES # BLD AUTO: 0 K/UL (ref 0–0.04)
IMM GRANULOCYTES NFR BLD AUTO: 0 % (ref 0–0.5)
LDLC SERPL CALC-MCNC: 52.8 MG/DL (ref 63–159)
LYMPHOCYTES # BLD AUTO: 0.9 K/UL (ref 1–4.8)
LYMPHOCYTES NFR BLD: 35.3 % (ref 18–48)
MCH RBC QN AUTO: 36.4 PG (ref 27–31)
MCHC RBC AUTO-ENTMCNC: 33 G/DL (ref 32–36)
MCV RBC AUTO: 110 FL (ref 82–98)
MONOCYTES # BLD AUTO: 0.4 K/UL (ref 0.3–1)
MONOCYTES NFR BLD: 14.3 % (ref 4–15)
NEUTROPHILS # BLD AUTO: 1.2 K/UL (ref 1.8–7.7)
NEUTROPHILS NFR BLD: 45.8 % (ref 38–73)
NONHDLC SERPL-MCNC: 63 MG/DL
NRBC BLD-RTO: 0 /100 WBC
PLATELET # BLD AUTO: 124 K/UL (ref 150–450)
PMV BLD AUTO: ABNORMAL FL (ref 9.2–12.9)
POTASSIUM SERPL-SCNC: 4 MMOL/L (ref 3.5–5.1)
PROT SERPL-MCNC: 6.7 G/DL (ref 6–8.4)
RBC # BLD AUTO: 2.75 M/UL (ref 4–5.4)
SODIUM SERPL-SCNC: 137 MMOL/L (ref 136–145)
TRIGL SERPL-MCNC: 51 MG/DL (ref 30–150)
TSH SERPL DL<=0.005 MIU/L-ACNC: 1.89 UIU/ML (ref 0.4–4)
WBC # BLD AUTO: 2.58 K/UL (ref 3.9–12.7)

## 2024-07-30 PROCEDURE — 80061 LIPID PANEL: CPT | Performed by: NURSE PRACTITIONER

## 2024-07-30 PROCEDURE — 36415 COLL VENOUS BLD VENIPUNCTURE: CPT | Mod: PO | Performed by: NURSE PRACTITIONER

## 2024-07-30 PROCEDURE — 85025 COMPLETE CBC W/AUTO DIFF WBC: CPT | Performed by: NURSE PRACTITIONER

## 2024-07-30 PROCEDURE — 84443 ASSAY THYROID STIM HORMONE: CPT | Performed by: NURSE PRACTITIONER

## 2024-07-30 PROCEDURE — 80053 COMPREHEN METABOLIC PANEL: CPT | Performed by: NURSE PRACTITIONER

## 2024-08-12 ENCOUNTER — TELEPHONE (OUTPATIENT)
Dept: FAMILY MEDICINE | Facility: CLINIC | Age: 89
End: 2024-08-12
Payer: MEDICARE

## 2024-08-12 NOTE — TELEPHONE ENCOUNTER
The thyroid level looks good with no change in the 112 mcg levothyroxine/Synthroid taking 1/2 daily.    The cholesterol levels are all in goal range and relatively stable with the total cholesterol of 123, goal is less than 200.  The triglycerides are 51 with a goal of less than 150, her HDL/good cholesterol is 60 with a goal of greater than 40, and the LDL/bad cholesterol is 52.8 with a goal of less than 100.    The blood count is still anemic, slightly worse than the last 1 November 2022.  This is accompanied by low platelets which are improved and a low white blood cell count which is slightly worse than last time.  We put in a referral for Hematology after the last labs for pancytopenia of unknown cause but she declined to schedule it.    The chemistry panel looks good with a normal blood sugar, normal kidney functions, normal liver panel, and normal electrolytes.    Everything looks good except for the blood count and that is affecting all cell lines, white cells, red cells, and platelets.  The cause is unknown.  I would still recommend having her see Hematology.

## 2024-08-12 NOTE — TELEPHONE ENCOUNTER
----- Message from May Delong sent at 8/12/2024  9:17 AM CDT -----  Contact: pt [phone]  Type:  Test Results    Who Called:  Pt   Name of Test (Lab/Mammo/Etc):  Labs   Date of Test:  07/30  Ordering Provider:  Juan Mullen   Where the test was performed:  [facility]   Best Call Back Number:  [phone]    Additional Information:  Pt stated Dr Mullen is requesting pcp review labs/ Pls call back and advise

## 2024-08-14 NOTE — TELEPHONE ENCOUNTER
Spoke with daughter Heather    Lab results and recommendations given   Verbalizes understanding   Refusing referral to hematology at this time.

## 2024-09-09 DIAGNOSIS — E78.2 MIXED HYPERLIPIDEMIA: ICD-10-CM

## 2024-09-09 RX ORDER — SIMVASTATIN 20 MG/1
20 TABLET, FILM COATED ORAL NIGHTLY
Qty: 90 TABLET | Refills: 2 | Status: SHIPPED | OUTPATIENT
Start: 2024-09-09

## 2024-09-09 NOTE — TELEPHONE ENCOUNTER
Refill Routing Note   Medication(s) are not appropriate for processing by [facility] for the following reason(s):        ED/Hospital Visit since last OV with provider    [facility] action(s):  Defer               Appointments  past 12m or future 3m with PCP    Date Provider   Last Visit   10/31/2023 Dale Fulton MD   Next Visit   11/1/2024 Dale Fulton MD   ED visits in past 90 days: 0        Note composed:3:54 PM 09/09/2024

## 2024-09-19 DIAGNOSIS — I48.20 CHRONIC ATRIAL FIBRILLATION: ICD-10-CM

## 2024-09-19 NOTE — TELEPHONE ENCOUNTER
Refill Routing Note   Medication(s) are not appropriate for processing by [facility] for the following reason(s):        Outside of protocol    [facility] action(s):  Route               Appointments  past 12m or future 3m with PCP    Date Provider   Last Visit   Visit date not found Dale Fulton MD   Next Visit   11/1/2024 Dale Fulton MD   ED visits in past 90 days: 0        Note composed:2:43 PM 09/19/2024

## 2024-09-20 NOTE — TELEPHONE ENCOUNTER
Refill Routing Note   Medication(s) are not appropriate for processing by [facility] for the following reason(s):        Outside of protocol    [facility] action(s):  Route               Appointments  past 12m or future 3m with PCP    Date Provider   Last Visit   Visit date not found Dale Fulton MD   Next Visit   9/19/2024 Dale Fulton MD   ED visits in past 90 days: 0        Note composed:9:15 PM 09/19/2024

## 2024-09-25 RX ORDER — APIXABAN 2.5 MG/1
2.5 TABLET, FILM COATED ORAL 2 TIMES DAILY
Qty: 180 TABLET | Refills: 0 | Status: SHIPPED | OUTPATIENT
Start: 2024-09-25

## 2024-10-28 ENCOUNTER — OFFICE VISIT (OUTPATIENT)
Dept: URGENT CARE | Facility: CLINIC | Age: 89
End: 2024-10-28
Payer: MEDICARE

## 2024-10-28 VITALS
SYSTOLIC BLOOD PRESSURE: 145 MMHG | RESPIRATION RATE: 16 BRPM | HEART RATE: 81 BPM | OXYGEN SATURATION: 95 % | WEIGHT: 135 LBS | DIASTOLIC BLOOD PRESSURE: 85 MMHG | HEIGHT: 64 IN | BODY MASS INDEX: 23.05 KG/M2 | TEMPERATURE: 98 F

## 2024-10-28 DIAGNOSIS — R09.82 POST-NASAL DRIP: ICD-10-CM

## 2024-10-28 DIAGNOSIS — N30.01 ACUTE CYSTITIS WITH HEMATURIA: Primary | ICD-10-CM

## 2024-10-28 DIAGNOSIS — R30.0 DYSURIA: ICD-10-CM

## 2024-10-28 LAB
BILIRUB UR QL STRIP: NEGATIVE
GLUCOSE UR QL STRIP: NEGATIVE
KETONES UR QL STRIP: NEGATIVE
LEUKOCYTE ESTERASE UR QL STRIP: POSITIVE
PH, POC UA: 6
POC BLOOD, URINE: POSITIVE
POC NITRATES, URINE: NEGATIVE
PROT UR QL STRIP: POSITIVE
SP GR UR STRIP: 1.02 (ref 1–1.03)
UROBILINOGEN UR STRIP-ACNC: ABNORMAL (ref 0.1–1.1)

## 2024-10-28 PROCEDURE — 81003 URINALYSIS AUTO W/O SCOPE: CPT | Mod: QW,S$GLB,, | Performed by: NURSE PRACTITIONER

## 2024-10-28 PROCEDURE — 99213 OFFICE O/P EST LOW 20 MIN: CPT | Mod: S$GLB,,, | Performed by: NURSE PRACTITIONER

## 2024-10-28 RX ORDER — CEFUROXIME AXETIL 250 MG/1
250 TABLET ORAL 2 TIMES DAILY
Qty: 10 TABLET | Refills: 0 | Status: SHIPPED | OUTPATIENT
Start: 2024-10-28 | End: 2024-11-01 | Stop reason: SDUPTHER

## 2024-10-28 RX ORDER — FLUTICASONE PROPIONATE 50 MCG
SPRAY, SUSPENSION (ML) NASAL
Qty: 11.1 ML | Refills: 1 | Status: SHIPPED | OUTPATIENT
Start: 2024-10-28 | End: 2024-12-04

## 2024-11-01 ENCOUNTER — LAB VISIT (OUTPATIENT)
Dept: LAB | Facility: HOSPITAL | Age: 89
End: 2024-11-01
Attending: FAMILY MEDICINE
Payer: MEDICARE

## 2024-11-01 ENCOUNTER — OFFICE VISIT (OUTPATIENT)
Dept: FAMILY MEDICINE | Facility: CLINIC | Age: 89
End: 2024-11-01
Attending: FAMILY MEDICINE
Payer: MEDICARE

## 2024-11-01 VITALS
HEART RATE: 79 BPM | TEMPERATURE: 99 F | SYSTOLIC BLOOD PRESSURE: 128 MMHG | BODY MASS INDEX: 21.42 KG/M2 | DIASTOLIC BLOOD PRESSURE: 72 MMHG | OXYGEN SATURATION: 97 % | HEIGHT: 64 IN | WEIGHT: 125.44 LBS

## 2024-11-01 DIAGNOSIS — I48.20 CHRONIC ATRIAL FIBRILLATION: ICD-10-CM

## 2024-11-01 DIAGNOSIS — E03.9 ACQUIRED HYPOTHYROIDISM: ICD-10-CM

## 2024-11-01 DIAGNOSIS — D64.9 ANEMIA, UNSPECIFIED TYPE: ICD-10-CM

## 2024-11-01 DIAGNOSIS — R53.83 FATIGUE, UNSPECIFIED TYPE: ICD-10-CM

## 2024-11-01 DIAGNOSIS — E55.9 HYPOVITAMINOSIS D: ICD-10-CM

## 2024-11-01 DIAGNOSIS — E21.0 PRIMARY HYPERPARATHYROIDISM: ICD-10-CM

## 2024-11-01 DIAGNOSIS — D69.6 THROMBOCYTOPENIA, UNSPECIFIED: ICD-10-CM

## 2024-11-01 DIAGNOSIS — N30.00 ACUTE CYSTITIS WITHOUT HEMATURIA: Primary | ICD-10-CM

## 2024-11-01 DIAGNOSIS — I10 ESSENTIAL HYPERTENSION: ICD-10-CM

## 2024-11-01 DIAGNOSIS — K43.9 SPIGELIAN HERNIA: ICD-10-CM

## 2024-11-01 DIAGNOSIS — E78.49 OTHER HYPERLIPIDEMIA: ICD-10-CM

## 2024-11-01 LAB
25(OH)D3+25(OH)D2 SERPL-MCNC: 50 NG/ML (ref 30–96)
ALBUMIN SERPL BCP-MCNC: 4.1 G/DL (ref 3.5–5.2)
ALP SERPL-CCNC: 101 U/L (ref 40–150)
ALT SERPL W/O P-5'-P-CCNC: 12 U/L (ref 10–44)
ANION GAP SERPL CALC-SCNC: 8 MMOL/L (ref 8–16)
AST SERPL-CCNC: 25 U/L (ref 10–40)
BACTERIA UR CULT: ABNORMAL
BACTERIA UR CULT: ABNORMAL
BASOPHILS # BLD AUTO: 0.07 K/UL (ref 0–0.2)
BASOPHILS NFR BLD: 1.7 % (ref 0–1.9)
BILIRUB SERPL-MCNC: 0.6 MG/DL (ref 0.1–1)
BNP SERPL-MCNC: 146 PG/ML (ref 0–99)
BUN SERPL-MCNC: 8 MG/DL (ref 10–30)
CALCIUM SERPL-MCNC: 10.4 MG/DL (ref 8.7–10.5)
CHLORIDE SERPL-SCNC: 101 MMOL/L (ref 95–110)
CO2 SERPL-SCNC: 26 MMOL/L (ref 23–29)
CREAT SERPL-MCNC: 0.7 MG/DL (ref 0.5–1.4)
DIFFERENTIAL METHOD BLD: ABNORMAL
EOSINOPHIL # BLD AUTO: 0.1 K/UL (ref 0–0.5)
EOSINOPHIL NFR BLD: 1.7 % (ref 0–8)
ERYTHROCYTE [DISTWIDTH] IN BLOOD BY AUTOMATED COUNT: 15.8 % (ref 11.5–14.5)
EST. GFR  (NO RACE VARIABLE): >60 ML/MIN/1.73 M^2
GLUCOSE SERPL-MCNC: 96 MG/DL (ref 70–110)
HCT VFR BLD AUTO: 32.1 % (ref 37–48.5)
HGB BLD-MCNC: 10.8 G/DL (ref 12–16)
IMM GRANULOCYTES # BLD AUTO: 0.03 K/UL (ref 0–0.04)
IMM GRANULOCYTES NFR BLD AUTO: 0.7 % (ref 0–0.5)
IRON SERPL-MCNC: 116 UG/DL (ref 30–160)
LYMPHOCYTES # BLD AUTO: 1 K/UL (ref 1–4.8)
LYMPHOCYTES NFR BLD: 23.6 % (ref 18–48)
MCH RBC QN AUTO: 37 PG (ref 27–31)
MCHC RBC AUTO-ENTMCNC: 33.6 G/DL (ref 32–36)
MCV RBC AUTO: 110 FL (ref 82–98)
MONOCYTES # BLD AUTO: 0.6 K/UL (ref 0.3–1)
MONOCYTES NFR BLD: 13.7 % (ref 4–15)
NEUTROPHILS # BLD AUTO: 2.4 K/UL (ref 1.8–7.7)
NEUTROPHILS NFR BLD: 58.6 % (ref 38–73)
NRBC BLD-RTO: 0 /100 WBC
OTHER ANTIBIOTIC SUSC ISLT: ABNORMAL
PLATELET # BLD AUTO: 202 K/UL (ref 150–450)
PMV BLD AUTO: 14.2 FL (ref 9.2–12.9)
POTASSIUM SERPL-SCNC: 4.2 MMOL/L (ref 3.5–5.1)
PROT SERPL-MCNC: 7.1 G/DL (ref 6–8.4)
RBC # BLD AUTO: 2.92 M/UL (ref 4–5.4)
SATURATED IRON: 34 % (ref 20–50)
SODIUM SERPL-SCNC: 135 MMOL/L (ref 136–145)
T4 FREE SERPL-MCNC: 0.85 NG/DL (ref 0.71–1.51)
TOTAL IRON BINDING CAPACITY: 337 UG/DL (ref 250–450)
TRANSFERRIN SERPL-MCNC: 228 MG/DL (ref 200–375)
TSH SERPL DL<=0.005 MIU/L-ACNC: 4.27 UIU/ML (ref 0.4–4)
WBC # BLD AUTO: 4.02 K/UL (ref 3.9–12.7)

## 2024-11-01 PROCEDURE — 85025 COMPLETE CBC W/AUTO DIFF WBC: CPT | Performed by: FAMILY MEDICINE

## 2024-11-01 PROCEDURE — 83880 ASSAY OF NATRIURETIC PEPTIDE: CPT | Performed by: FAMILY MEDICINE

## 2024-11-01 PROCEDURE — 99214 OFFICE O/P EST MOD 30 MIN: CPT | Mod: PBBFAC,PN | Performed by: FAMILY MEDICINE

## 2024-11-01 PROCEDURE — 82306 VITAMIN D 25 HYDROXY: CPT | Performed by: FAMILY MEDICINE

## 2024-11-01 PROCEDURE — 84439 ASSAY OF FREE THYROXINE: CPT | Performed by: FAMILY MEDICINE

## 2024-11-01 PROCEDURE — 36415 COLL VENOUS BLD VENIPUNCTURE: CPT | Mod: PO | Performed by: FAMILY MEDICINE

## 2024-11-01 PROCEDURE — 80053 COMPREHEN METABOLIC PANEL: CPT | Performed by: FAMILY MEDICINE

## 2024-11-01 PROCEDURE — 83540 ASSAY OF IRON: CPT | Performed by: FAMILY MEDICINE

## 2024-11-01 PROCEDURE — 99999 PR PBB SHADOW E&M-EST. PATIENT-LVL IV: CPT | Mod: PBBFAC,,, | Performed by: FAMILY MEDICINE

## 2024-11-01 PROCEDURE — 84443 ASSAY THYROID STIM HORMONE: CPT | Performed by: FAMILY MEDICINE

## 2024-11-01 RX ORDER — CEFUROXIME AXETIL 250 MG/1
250 TABLET ORAL 2 TIMES DAILY
Qty: 10 TABLET | Refills: 0 | Status: SHIPPED | OUTPATIENT
Start: 2024-11-01 | End: 2024-11-06

## 2024-11-22 DIAGNOSIS — I10 ESSENTIAL HYPERTENSION: ICD-10-CM

## 2024-11-22 RX ORDER — LISINOPRIL 40 MG/1
40 TABLET ORAL DAILY
Qty: 90 TABLET | Refills: 3 | Status: SHIPPED | OUTPATIENT
Start: 2024-11-22

## 2024-11-22 NOTE — TELEPHONE ENCOUNTER
No care due was identified.  [facility] Embedded Care Due Messages. Reference number: [id number].   11/22/2024 12:02:23 PM CST

## 2024-11-22 NOTE — TELEPHONE ENCOUNTER
Refill Decision Note   Ama Lang  is requesting a refill authorization.  Brief Assessment and Rationale for Refill:  Approve     Medication Therapy Plan:        Comments:     Note composed:3:47 PM 11/22/2024

## 2024-11-23 ENCOUNTER — OFFICE VISIT (OUTPATIENT)
Dept: URGENT CARE | Facility: CLINIC | Age: 89
End: 2024-11-23
Payer: MEDICARE

## 2024-11-23 VITALS
RESPIRATION RATE: 18 BRPM | BODY MASS INDEX: 21.34 KG/M2 | OXYGEN SATURATION: 94 % | TEMPERATURE: 99 F | HEIGHT: 64 IN | DIASTOLIC BLOOD PRESSURE: 80 MMHG | WEIGHT: 125 LBS | SYSTOLIC BLOOD PRESSURE: 148 MMHG | HEART RATE: 89 BPM

## 2024-11-23 DIAGNOSIS — R35.0 URINARY FREQUENCY: ICD-10-CM

## 2024-11-23 DIAGNOSIS — N30.01 ACUTE CYSTITIS WITH HEMATURIA: Primary | ICD-10-CM

## 2024-11-23 DIAGNOSIS — R30.0 DYSURIA: ICD-10-CM

## 2024-11-23 LAB
BILIRUB UR QL STRIP: NEGATIVE
GLUCOSE UR QL STRIP: NEGATIVE
KETONES UR QL STRIP: NEGATIVE
LEUKOCYTE ESTERASE UR QL STRIP: POSITIVE
PH, POC UA: 6
POC BLOOD, URINE: POSITIVE
POC NITRATES, URINE: NEGATIVE
PROT UR QL STRIP: POSITIVE
SP GR UR STRIP: 1.01 (ref 1–1.03)
UROBILINOGEN UR STRIP-ACNC: NORMAL (ref 0.1–1.1)

## 2024-11-23 PROCEDURE — 99214 OFFICE O/P EST MOD 30 MIN: CPT | Mod: S$GLB,,,

## 2024-11-23 PROCEDURE — 81003 URINALYSIS AUTO W/O SCOPE: CPT | Mod: QW,S$GLB,,

## 2024-11-23 RX ORDER — AMOXICILLIN AND CLAVULANATE POTASSIUM 875; 125 MG/1; MG/1
1 TABLET, FILM COATED ORAL EVERY 12 HOURS
Qty: 14 TABLET | Refills: 0 | Status: SHIPPED | OUTPATIENT
Start: 2024-11-23 | End: 2024-11-30

## 2024-11-23 NOTE — PROGRESS NOTES
"Subjective:      Patient ID: Ama Lang is a 89 y.o. female.    Vitals:  height is 5' 4" (1.626 m) and weight is 56.7 kg (125 lb). Her oral temperature is 98.6 °F (37 °C). Her blood pressure is 148/80 (abnormal) and her pulse is 89. Her respiration is 18 and oxygen saturation is 94% (abnormal).     Chief Complaint: Dysuria    89-year-old female presents for evaluation with daughter.  Patient reports symptoms initially started 2 Fridays ago and she was seen by primary care who suggested the pain she was having was related to a hernia.  Patient reports she has frequent UTIs and believes at this time she has a UTI again.  Patient denies fever, chills, body aches but reports dysuria, urinary frequency.  Denies gross hematuria.  Patient has a past medical history of AFib, hypertension, recurrent UTIs.  She is on Eliquis.    Dysuria   This is a recurrent problem. Episode onset: " a few days" The problem occurs every urination. The quality of the pain is described as aching. The pain is at a severity of 4/10. The pain is mild. There has been no fever. Associated symptoms include frequency, hesitancy and urgency. Pertinent negatives include no chills, flank pain or hematuria. She has tried nothing for the symptoms. Her past medical history is significant for recurrent UTIs.       Constitution: Negative for chills, fatigue and fever.   HENT: Negative.     Cardiovascular: Negative.    Gastrointestinal: Negative.    Genitourinary:  Positive for dysuria, frequency and urgency. Negative for flank pain and hematuria.   Musculoskeletal: Negative.    Neurological: Negative.    Psychiatric/Behavioral: Negative.        Objective:     Physical Exam   Constitutional: She is oriented to person, place, and time. She appears well-developed.   HENT:   Head: Normocephalic and atraumatic.   Ears:   Right Ear: External ear normal.   Left Ear: External ear normal.   Nose: Nose normal. No nasal deformity. No epistaxis.   Mouth/Throat: " Oropharynx is clear and moist and mucous membranes are normal.   Eyes: Lids are normal.   Neck: Trachea normal and phonation normal. Neck supple.   Cardiovascular: Normal rate.   Pulmonary/Chest: Effort normal. No respiratory distress.   Abdominal: Normal appearance. She exhibits no distension. Soft. There is no abdominal tenderness.   Musculoskeletal: Normal range of motion.         General: Normal range of motion.   Neurological: She is alert and oriented to person, place, and time. She displays no weakness.   Skin: Skin is warm, dry and intact.   Psychiatric: Her speech is normal and behavior is normal. Mood, judgment and thought content normal.   Nursing note and vitals reviewed.      Assessment:     1. Acute cystitis with hematuria    2. Dysuria    3. Urinary frequency        Plan:       Acute cystitis with hematuria  -     CULTURE, URINE  -     amoxicillin-clavulanate 875-125mg (AUGMENTIN) 875-125 mg per tablet; Take 1 tablet by mouth every 12 (twelve) hours. for 7 days  Dispense: 14 tablet; Refill: 0    Dysuria  -     POCT Urinalysis, Dipstick, Manual, W/O Scope    Urinary frequency      Discussed urology referral with patient and her daughter.  Patient has seen Urology in the past and would like to defer going to see them again.  Urine culture sent.  Urinalysis abnormal.    Discussed medication with patient who acknowledges understanding and is agreeable to POC. Follow up with primary care. Increase fluid intake. Red flags for ER discussed.

## 2024-11-28 DIAGNOSIS — I10 ESSENTIAL HYPERTENSION: ICD-10-CM

## 2024-11-28 NOTE — TELEPHONE ENCOUNTER
Refill Routing Note   Medication(s) are not appropriate for processing by [facility] for the following reason(s):        Required vitals abnormal    [facility] action(s):  Defer               Appointments  past 12m or future 3m with PCP    Date Provider   Last Visit   11/1/2024 Dale Fulton MD   Next Visit   Visit date not found Dale Fulton MD   ED visits in past 90 days: 0        Note composed:11:43 AM 11/28/2024

## 2024-11-28 NOTE — TELEPHONE ENCOUNTER
No care due was identified.  [facility] Embedded Care Due Messages. Reference number: [id number].   11/28/2024 8:01:47 AM CST

## 2024-11-29 DIAGNOSIS — J30.9 ALLERGIC RHINITIS, UNSPECIFIED SEASONALITY, UNSPECIFIED TRIGGER: ICD-10-CM

## 2024-11-29 DIAGNOSIS — E03.9 ACQUIRED HYPOTHYROIDISM: ICD-10-CM

## 2024-11-29 RX ORDER — ATENOLOL 50 MG/1
50 TABLET ORAL 2 TIMES DAILY
Qty: 180 TABLET | Refills: 1 | Status: SHIPPED | OUTPATIENT
Start: 2024-11-29

## 2024-11-29 RX ORDER — LEVOCETIRIZINE DIHYDROCHLORIDE 5 MG/1
5 TABLET, FILM COATED ORAL NIGHTLY
Qty: 90 TABLET | Refills: 3 | Status: SHIPPED | OUTPATIENT
Start: 2024-11-29

## 2024-11-29 NOTE — TELEPHONE ENCOUNTER
No care due was identified.  [facility] Embedded Care Due Messages. Reference number: [id number].   11/29/2024 8:04:18 AM CST

## 2024-11-29 NOTE — TELEPHONE ENCOUNTER
Refill Routing Note   Medication(s) are not appropriate for processing by [facility] for the following reason(s):        Required labs abnormal    [facility] action(s):  Approve  Defer             Appointments  past 12m or future 3m with PCP    Date Provider   Last Visit   11/1/2024 Dale Fulton MD   Next Visit   Visit date not found Dale Fulton MD   ED visits in past 90 days: 0        Note composed:2:51 PM 11/29/2024

## 2024-11-30 LAB
BACTERIA UR CULT: ABNORMAL
BACTERIA UR CULT: ABNORMAL
OTHER ANTIBIOTIC SUSC ISLT: ABNORMAL

## 2024-12-02 RX ORDER — LEVOTHYROXINE SODIUM 112 UG/1
56 TABLET ORAL
Qty: 45 TABLET | Refills: 3 | Status: SHIPPED | OUTPATIENT
Start: 2024-12-02

## 2024-12-05 ENCOUNTER — TELEPHONE (OUTPATIENT)
Dept: URGENT CARE | Facility: CLINIC | Age: 89
End: 2024-12-05
Payer: MEDICARE

## 2024-12-05 ENCOUNTER — OFFICE VISIT (OUTPATIENT)
Dept: URGENT CARE | Facility: CLINIC | Age: 89
End: 2024-12-05
Payer: MEDICARE

## 2024-12-05 VITALS
RESPIRATION RATE: 20 BRPM | OXYGEN SATURATION: 96 % | DIASTOLIC BLOOD PRESSURE: 67 MMHG | BODY MASS INDEX: 21.34 KG/M2 | SYSTOLIC BLOOD PRESSURE: 112 MMHG | WEIGHT: 125 LBS | TEMPERATURE: 99 F | HEART RATE: 80 BPM | HEIGHT: 64 IN

## 2024-12-05 DIAGNOSIS — Z87.440 HISTORY OF RECURRENT UTIS: ICD-10-CM

## 2024-12-05 DIAGNOSIS — R35.0 URINARY FREQUENCY: Primary | ICD-10-CM

## 2024-12-05 DIAGNOSIS — N30.00 ACUTE CYSTITIS WITHOUT HEMATURIA: ICD-10-CM

## 2024-12-05 LAB
BILIRUB UR QL STRIP: NEGATIVE
GLUCOSE UR QL STRIP: NEGATIVE
KETONES UR QL STRIP: POSITIVE
LEUKOCYTE ESTERASE UR QL STRIP: POSITIVE
PH, POC UA: 6.5
POC BLOOD, URINE: POSITIVE
POC NITRATES, URINE: NEGATIVE
PROT UR QL STRIP: POSITIVE
SP GR UR STRIP: 1.01 (ref 1–1.03)
UROBILINOGEN UR STRIP-ACNC: ABNORMAL (ref 0.1–1.1)

## 2024-12-05 PROCEDURE — 99214 OFFICE O/P EST MOD 30 MIN: CPT | Mod: S$GLB,,, | Performed by: NURSE PRACTITIONER

## 2024-12-05 PROCEDURE — 81003 URINALYSIS AUTO W/O SCOPE: CPT | Mod: QW,S$GLB,, | Performed by: NURSE PRACTITIONER

## 2024-12-05 RX ORDER — CEPHALEXIN 500 MG/1
500 CAPSULE ORAL EVERY 12 HOURS
Qty: 14 CAPSULE | Refills: 0 | Status: SHIPPED | OUTPATIENT
Start: 2024-12-05 | End: 2024-12-12

## 2024-12-05 NOTE — PATIENT INSTRUCTIONS
Keflex twice a day for 7 days  Increase fluid intake significantly to help flush urinary system    For your recurrent UTIs:  Behavioral changes to help decrease UTI recurrences   -increase water intake (6 to 8 bottles water per day)   -don't hold urine, void every 2 to 3 hours   -prevent constipation (miralax capful daily if necessary) to have a bowel movement daily and keep stools soft  -cut down on caffeine,drink mainly water  -no douching   -void immediately after sexual intercourse  -wipe front to back      OTC meds to try (go to the pharmacist and ask where they are, they are over the counter)  -Use lactobacillus probiotic in capsule form in vagina once nightly for 10 days if you feel like you have an infection coming on   -cranberry pills 500mg tabs TID, can buy over the counter  -take a probiotic daily       Referral was placed to Urology, someone should be calling you to schedule an appointment

## 2024-12-05 NOTE — PROGRESS NOTES
"Subjective:      Patient ID: Ama Lang is a 89 y.o. female.    Vitals:  height is 5' 4" (1.626 m) and weight is 56.7 kg (125 lb). Her temperature is 98.5 °F (36.9 °C). Her blood pressure is 112/67 and her pulse is 80. Her respiration is 20 and oxygen saturation is 96%.     Chief Complaint: Urinary Frequency    Pt c/o urinary frequency, dysuria, back pain x2 weeks. Pt was seen 11/23/24 for uti and finished all her medications but her problem still persists.     Urinary Frequency   Associated symptoms include frequency. Pertinent negatives include no chills, flank pain, hematuria or urgency.       Constitution: Negative for chills and fever.   Gastrointestinal:  Negative for abdominal pain.   Genitourinary:  Positive for frequency. Negative for urgency, flank pain, hematuria and pelvic pain.   Musculoskeletal:  Negative for back pain.      Objective:     Physical Exam   Constitutional: She is oriented to person, place, and time.  Non-toxic appearance. She does not appear ill. No distress.   HENT:   Head: Normocephalic and atraumatic.   Nose: Nose normal.   Mouth/Throat: Mucous membranes are moist.   Eyes: Conjunctivae are normal.   Cardiovascular: Normal rate.   Pulmonary/Chest: Effort normal. No respiratory distress.   Abdominal: Normal appearance.   Neurological: no focal deficit. She is alert and oriented to person, place, and time.   Skin: Skin is warm and dry. Capillary refill takes 2 to 3 seconds.   Psychiatric: Her behavior is normal. Mood normal.   Nursing note and vitals reviewed.      Assessment:     1. Urinary frequency    2. Acute cystitis without hematuria    3. History of recurrent UTIs      UA: 200 rbc's, 500 wbc's, 15 ketones, 100 protein.  Urine culture pending    Last GFR documented greater than 60  Plan:       Urinary frequency  -     POCT Urinalysis, Dipstick, Manual, W/O Scope    Acute cystitis without hematuria  -     CULTURE, URINE  -     Ambulatory referral/consult to Urology  -     " cephALEXin (KEFLEX) 500 MG capsule; Take 1 capsule (500 mg total) by mouth every 12 (twelve) hours. for 7 days  Dispense: 14 capsule; Refill: 0    History of recurrent UTIs  -     Ambulatory referral/consult to Urology

## 2024-12-05 NOTE — TELEPHONE ENCOUNTER
Patient's Daughter Bertrand called for urine cx results.    I returned call and spoke with Bertrand, she stated Patient went to [facility] due to her still not feeling well.

## 2024-12-12 ENCOUNTER — TELEPHONE (OUTPATIENT)
Dept: URGENT CARE | Facility: CLINIC | Age: 89
End: 2024-12-12
Payer: MEDICARE

## 2024-12-12 DIAGNOSIS — B96.20 E. COLI UTI (URINARY TRACT INFECTION): Primary | ICD-10-CM

## 2024-12-12 DIAGNOSIS — N39.0 E. COLI UTI (URINARY TRACT INFECTION): Primary | ICD-10-CM

## 2024-12-12 NOTE — TELEPHONE ENCOUNTER
----- Message from Balbir Estevez NP sent at 12/12/2024  9:19 AM CST -----  Attempted to contact patient/daughter via provided phone number to advise of positive urine culture results for E coli.  Patient should continue antibiotics as prescribed as Keflex as susceptible to bacteria.  Patient should follow-up with Urology and PCP or return to clinic as needed.  Unable to reach individual.  Voicemail left.    Daughter notified of results and states patient is feeling much better.  Daughter has no further questions or concerns at this point.

## 2025-01-04 ENCOUNTER — OFFICE VISIT (OUTPATIENT)
Dept: URGENT CARE | Facility: CLINIC | Age: OVER 89
End: 2025-01-04
Payer: MEDICARE

## 2025-01-04 VITALS
TEMPERATURE: 98 F | BODY MASS INDEX: 21.34 KG/M2 | DIASTOLIC BLOOD PRESSURE: 76 MMHG | HEIGHT: 64 IN | WEIGHT: 125 LBS | RESPIRATION RATE: 15 BRPM | OXYGEN SATURATION: 96 % | SYSTOLIC BLOOD PRESSURE: 128 MMHG | HEART RATE: 67 BPM

## 2025-01-04 DIAGNOSIS — Z79.01 LONG TERM CURRENT USE OF ANTICOAGULANT: ICD-10-CM

## 2025-01-04 DIAGNOSIS — Z86.79 HISTORY OF HYPERTENSION: ICD-10-CM

## 2025-01-04 DIAGNOSIS — N30.00 ACUTE CYSTITIS WITHOUT HEMATURIA: ICD-10-CM

## 2025-01-04 DIAGNOSIS — N39.0 RECURRENT UTI: ICD-10-CM

## 2025-01-04 DIAGNOSIS — R30.0 DYSURIA: Primary | ICD-10-CM

## 2025-01-04 LAB
BILIRUB UR QL STRIP: NEGATIVE
GLUCOSE UR QL STRIP: NEGATIVE
KETONES UR QL STRIP: NEGATIVE
LEUKOCYTE ESTERASE UR QL STRIP: POSITIVE
PH, POC UA: 6
POC BLOOD, URINE: POSITIVE
POC NITRATES, URINE: NEGATIVE
PROT UR QL STRIP: POSITIVE
SP GR UR STRIP: 1.02 (ref 1–1.03)
UROBILINOGEN UR STRIP-ACNC: 0.2 (ref 0.1–1.1)

## 2025-01-04 PROCEDURE — 99214 OFFICE O/P EST MOD 30 MIN: CPT | Mod: S$GLB,,, | Performed by: NURSE PRACTITIONER

## 2025-01-04 PROCEDURE — 81003 URINALYSIS AUTO W/O SCOPE: CPT | Mod: QW,S$GLB,, | Performed by: NURSE PRACTITIONER

## 2025-01-04 RX ORDER — DOXYCYCLINE 100 MG/1
100 CAPSULE ORAL 2 TIMES DAILY
Qty: 20 CAPSULE | Refills: 0 | Status: SHIPPED | OUTPATIENT
Start: 2025-01-04 | End: 2025-01-14

## 2025-01-04 NOTE — PROGRESS NOTES
"Subjective:      Patient ID: Ama Lang is a 89 y.o. female.    Vitals:  height is 5' 4" (1.626 m) and weight is 56.7 kg (125 lb). Her oral temperature is 97.9 °F (36.6 °C). Her blood pressure is 128/76 and her pulse is 67. Her respiration is 15 and oxygen saturation is 96%.     Chief Complaint: Urinary Tract Infection    Urinary Tract Infection   This is a new problem. The current episode started today. The problem occurs every urination. The problem has been unchanged. The quality of the pain is described as burning. The pain is severe. There has been no fever. She is Not sexually active. There is No history of pyelonephritis. Associated symptoms include flank pain, frequency and urgency. Pertinent negatives include no chills, hematuria, nausea or vomiting. She has tried increased fluids for the symptoms. The treatment provided no relief.       Constitution: Negative for chills and fever.   Gastrointestinal:  Negative for abdominal pain, nausea and vomiting.   Genitourinary:  Positive for dysuria, frequency, urgency and flank pain. Negative for hematuria and pelvic pain.   Musculoskeletal:  Negative for back pain.   Neurological:  Negative for disorientation and altered mental status.   Psychiatric/Behavioral:  Negative for altered mental status, disorientation and confusion.       Objective:     Physical Exam   Constitutional: She is oriented to person, place, and time. She is cooperative.  Non-toxic appearance. She does not appear ill. No distress. well-groomedawake  HENT:   Head: Normocephalic and atraumatic.   Nose: Nose normal.   Mouth/Throat: Mucous membranes are moist.   Eyes: Conjunctivae are normal.   Cardiovascular: Normal rate.   Pulmonary/Chest: Effort normal. No respiratory distress.   Abdominal: Normal appearance. There is no left CVA tenderness and no right CVA tenderness.   Neurological: no focal deficit. She is alert and oriented to person, place, and time.   Skin: Skin is warm, dry and not " diaphoretic. Capillary refill takes 2 to 3 seconds.   Psychiatric: Her behavior is normal. Mood normal.   Nursing note and vitals reviewed.    Assessment:     1. Dysuria    2. Acute cystitis without hematuria    3. Recurrent UTI    4. History of hypertension    5. Long term current use of anticoagulant        Plan:       Dysuria  -     POCT Urinalysis, Dipstick, Automated, W/O Scope    Acute cystitis without hematuria  -     Urine Culture High Risk  -     doxycycline (MONODOX) 100 MG capsule; Take 1 capsule (100 mg total) by mouth 2 (two) times daily. for 10 days  Dispense: 20 capsule; Refill: 0    Recurrent UTI    History of hypertension    Long term current use of anticoagulant

## 2025-01-04 NOTE — PATIENT INSTRUCTIONS
Doxycycline twice a day for 10 days.  Always take with food and a full glass of water and do not lay down for 1 hour after taking each dose.  Protect your skin against the sun as doxycycline is well known for causing excessive skin sun sensitivity resulting in severe sunburn, rash, blistering.    Urine collected today was sent out to the lab for culture expect results in 3-7 days.  You can be looking on the [facility] MyChart for results or someone from the clinic will be contacting you once we have received results and had time to review them    Increase fluid intake significantly to help flush urinary system  Return to clinic or seek medical re-evaluation or go to the emergency department if symptoms worsen or fail to improve after 48-72 hours the above treatment plan  Referral was placed to Urology.  Someone should be calling you to schedule an appointment    For your recurrent UTIs:  Behavioral changes to help decrease UTI recurrences   -increase water intake (6 to 8 bottles water per day)   -don't hold urine, void every 2 to 3 hours   -prevent constipation (miralax capful daily if necessary) to have a bowel movement daily and keep stools soft  -cut down on caffeine,drink mainly water  -no douching   -void immediately after sexual intercourse  -wipe front to back      OTC meds to try (go to the pharmacist and ask where they are, they are over the counter)  -Use lactobacillus probiotic in capsule form in vagina once nightly for 10 days if you feel like you have an infection coming on   -cranberry pills 500mg tabs TID, can buy over the counter  -take a probiotic daily

## 2025-01-06 NOTE — PROGRESS NOTES
Subjective:       Patient ID: Ama Lang is a 89 y.o. female.    Chief Complaint: Annual Exam (Pt states that she is here for her annual exam )    89-year-old female coming in for annual exam and follow-up of multiple medical problems.  She has been doing well since last seen following hospitalization for hyponatremia a year ago.  Her cardiologist has recently increased her amlodipine from 2.5 mg once daily to b.i.d. and she remains on Eliquis, to Efra, lisinopril, Zocor, 1/2 of a Synthroid 112 mcg daily, and Prilosec.  She is on a temporary course of Macrobid given to her at urgent care for urinary tract infection and has one pill left.  She has discontinued Fosamax and Flonase but continues using Astelin PRN.  She denies any other complaints but on questioning she does report some itching in the area of the left scapula.  She is unable to tell me how long this has been going on.    Past Medical History:  No date: *Atrial fibrillation      Comment:  Dr. Mullen  No date: Cancer      Comment:  skin cancer arms   No date: Cataract  No date: Diverticulitis  No date: Hyperlipidemia  No date: Hypertension  No date: Hypothyroidism    Past Surgical History:  12/18/2003: COLONOSCOPY      Comment:  Dr. Santoyo  2008: CYSTOSCOPY      Comment:  Dr. Nguyen  1/20/2021: CYSTOSCOPY; N/A      Comment:  Procedure: CYSTOSCOPY;  Surgeon: Khushbu Robbins MD;               Location: [facility];  Service: Urology;  Laterality: N/A;  No date: DILATION AND CURETTAGE OF UTERUS  No date: EYE SURGERY  08/2019: skin cancer bilateral arms      Comment:  Dr Soler skin cancer both arms   No date: TUBAL LIGATION    Review of patient's family history indicates:  Problem: Cancer      Relation: Mother          Age of Onset: (Not Specified)          Comment: ovarian  Problem: Heart disease      Relation: Father          Age of Onset: (Not Specified)  Problem: [place]'s disease      Relation: Father          Age of Onset: (Not  Writer was about to return call to patient but see that patient is currently in the ED. Will return call once discharged.    Specified)  Problem: Macular degeneration      Relation: Father          Age of Onset: (Not Specified)  Problem: Heart disease      Relation: Brother          Age of Onset: (Not Specified)  Problem: Cancer      Relation: Sister          Age of Onset: (Not Specified)          Comment: blood  Problem: Cancer      Relation: Brother          Age of Onset: (Not Specified)  Problem: Petroleum's disease      Relation: Daughter          Age of Onset: (Not Specified)  Problem: Early death      Relation: Daughter          Age of Onset: (Not Specified)  Problem: Arthritis      Relation: Sister          Age of Onset: (Not Specified)          Comment: spine  Problem: Carpal tunnel syndrome      Relation: Son          Age of Onset: (Not Specified)  Problem: Hypertension      Relation: Son          Age of Onset: (Not Specified)  Problem: Fibromyalgia      Relation: Daughter          Age of Onset: (Not Specified)  Problem: Petroleum's disease      Relation: Daughter          Age of Onset: (Not Specified)  Problem: Arthritis      Relation: Daughter          Age of Onset: (Not Specified)          Comment: x2  Problem: Kidney cancer      Relation: Neg Hx          Age of Onset: (Not Specified)  Problem: Prostate cancer      Relation: Neg Hx          Age of Onset: (Not Specified)  Problem: Urolithiasis      Relation: Neg Hx          Age of Onset: (Not Specified)    Social History    Tobacco Use      Smoking status: Never      Smokeless tobacco: Never    Alcohol use: No    Drug use: No    Current Outpatient Medications on File Prior to Visit:  azelastine (ASTELIN) 137 mcg (0.1 %) nasal spray, 1 spray (137 mcg total) by Nasal route 2 (two) times daily as needed for Rhinitis., Disp: 30 mL, Rfl: 11  estradioL (ESTRACE) 0.01 % (0.1 mg/gram) vaginal cream, Place 2 g vaginally twice a week. Place into vagina daily for two weeks and then spread out to twice a week., Disp: 16 g, Rfl: 11  fexofenadine (ALLEGRA) 180 MG tablet, Take 180 mg by  mouth., Disp: , Rfl:   guaiFENesin (MUCINEX) 600 mg 12 hr tablet, Take 1,200 mg by mouth 2 (two) times daily., Disp: , Rfl:   Lactobacillus rhamnosus GG (CULTURELLE) 10 billion cell capsule, Take 1 capsule by mouth once daily., Disp: , Rfl:   levocetirizine (XYZAL) 5 MG tablet, Take 1 tablet (5 mg total) by mouth every evening., Disp: 30 tablet, Rfl: 11  multivitamin (THERAGRAN) per tablet, Take 1 tablet by mouth daily as needed. , Disp: , Rfl:   nitrofurantoin, macrocrystal-monohydrate, (MACROBID) 100 MG capsule, Take 100 mg by mouth every 12 (twelve) hours., Disp: , Rfl:   omeprazole (PRILOSEC) 20 MG capsule, Take 1 capsule by mouth once daily., Disp: , Rfl:   simvastatin (ZOCOR) 20 MG tablet, TAKE 1 TABLET (20 MG TOTAL) BY MOUTH EVERY EVENING., Disp: 90 tablet, Rfl: 3  triamcinolone acetonide 0.025% (KENALOG) 0.025 % cream, Apply topically 2 (two) times daily., Disp: , Rfl:   VIT C/VIT E/LUTEIN/MIN/OMEGA-3 (OCUVITE ORAL), Take 1 tablet by mouth once daily., Disp: , Rfl:   vitamin E 400 UNIT capsule, Take 400 Units by mouth once daily., Disp: , Rfl:   [DISCONTINUED] amLODIPine (NORVASC) 2.5 MG tablet, Take 1 tablet (2.5 mg total) by mouth once daily. (Patient taking differently: Take 2.5 mg by mouth 2 (two) times daily.), Disp: 90 tablet, Rfl: 3  [DISCONTINUED] apixaban (ELIQUIS) 2.5 mg Tab, Take 2.5 mg by mouth 2 (two) times daily. , Disp: , Rfl:   [DISCONTINUED] atenoloL (TENORMIN) 50 MG tablet, Take 1 tablet (50 mg total) by mouth 2 (two) times daily., Disp: 180 tablet, Rfl: 3  [DISCONTINUED] levothyroxine (SYNTHROID) 112 MCG tablet, Take 0.5 tablets (56 mcg total) by mouth once daily., Disp: 45 tablet, Rfl: 0  [DISCONTINUED] lisinopriL (PRINIVIL,ZESTRIL) 40 MG tablet, TAKE ONE TABLET BY MOUTH EVERY DAY, Disp: 90 tablet, Rfl: 1  famotidine (PEPCID) 40 MG tablet, Take 1 tablet (40 mg total) by mouth once daily., Disp: 30 tablet, Rfl: 11  fluticasone propionate (FLONASE) 50 mcg/actuation nasal spray, USE TWO  SPRAYS IN EACH NOSTRIL ONCE A DAY (Patient not taking: Reported on 10/31/2023), Disp: 48 g, Rfl: 1  [DISCONTINUED] alendronate (FOSAMAX) 70 MG tablet, Take 1 tablet (70 mg total) by mouth every 7 days. (Patient not taking: Reported on 10/31/2023), Disp: 12 tablet, Rfl: 3  [DISCONTINUED] aspirin (ECOTRIN) 81 MG EC tablet, Take 81 mg by mouth once daily., Disp: , Rfl:     No current facility-administered medications on file prior to visit.          Review of Systems   Constitutional:  Negative for chills, diaphoresis, fatigue, fever and unexpected weight change.   HENT:  Negative for congestion, ear pain, hearing loss, postnasal drip, sinus pressure and sneezing.    Eyes:  Negative for itching and visual disturbance.   Respiratory:  Negative for cough, chest tightness, shortness of breath and wheezing.    Cardiovascular:  Negative for chest pain, palpitations and leg swelling.   Gastrointestinal:  Negative for abdominal pain, blood in stool, constipation, diarrhea, nausea and vomiting.   Genitourinary:  Positive for frequency (Improving post treatment for UTI). Negative for dysuria and hematuria.        Dr. Vidal is following her for a 5.1 cm right ovarian cyst, slightly complex but appears benign   Musculoskeletal:  Negative for arthralgias, back pain, joint swelling and myalgias.   Skin:  Positive for rash.   Neurological:  Negative for dizziness and headaches.   Hematological:  Negative for adenopathy.   Psychiatric/Behavioral:  Negative for sleep disturbance. The patient is not nervous/anxious.        Objective:      Physical Exam  Vitals and nursing note reviewed.   Constitutional:       General: She is not in acute distress.     Appearance: Normal appearance. She is well-developed and normal weight. She is not ill-appearing, toxic-appearing or diaphoretic.      Comments: Fair blood pressure control   Irregular rate and rhythm with borderline tachycardia consistent with atrial fib   Normal weight with a BMI of  22.8 she is up 6.3 lb from her September 6, 2022 last visit   HENT:      Head: Normocephalic and atraumatic.      Right Ear: Tympanic membrane, ear canal and external ear normal. There is no impacted cerumen.      Left Ear: Tympanic membrane, ear canal and external ear normal. There is no impacted cerumen.      Ears:      Comments: Hearing aid left side only, no hearing on right side     Nose: Nose normal. No congestion or rhinorrhea.      Mouth/Throat:      Mouth: Mucous membranes are moist.      Pharynx: Oropharynx is clear. No oropharyngeal exudate or posterior oropharyngeal erythema.   Eyes:      General: No scleral icterus.        Right eye: No discharge.         Left eye: No discharge.      Extraocular Movements: Extraocular movements intact.      Conjunctiva/sclera: Conjunctivae normal.      Pupils: Pupils are equal, round, and reactive to light.   Neck:      Thyroid: No thyromegaly.      Vascular: No carotid bruit or JVD.   Cardiovascular:      Rate and Rhythm: Tachycardia present. Rhythm irregular.      Heart sounds: Normal heart sounds. No murmur heard.     No friction rub. No gallop.   Pulmonary:      Effort: Pulmonary effort is normal. No respiratory distress.      Breath sounds: Normal breath sounds. No stridor. No wheezing, rhonchi or rales.   Chest:      Chest wall: No tenderness.   Abdominal:      General: Bowel sounds are normal. There is no distension.      Palpations: Abdomen is soft. There is no mass.      Tenderness: There is no abdominal tenderness. There is no guarding or rebound.      Hernia: No hernia is present.   Musculoskeletal:         General: No tenderness. Normal range of motion.      Cervical back: Normal range of motion and neck supple. No rigidity or tenderness.      Right lower leg: No edema (Trace only).      Left lower leg: No edema (Trace only).   Lymphadenopathy:      Cervical: No cervical adenopathy.   Skin:     General: Skin is warm and dry.      Coloration: Skin is not  "jaundiced or pale.      Findings: Erythema and rash present. No bruising. Rash is papular and vesicular.          Neurological:      General: No focal deficit present.      Mental Status: She is alert and oriented to person, place, and time. Mental status is at baseline.      Cranial Nerves: No cranial nerve deficit.      Sensory: No sensory deficit.      Motor: No weakness.      Coordination: Coordination normal.      Gait: Gait normal.      Deep Tendon Reflexes: Reflexes are normal and symmetric. Reflexes normal.   Psychiatric:         Mood and Affect: Mood normal.         Behavior: Behavior normal.         Thought Content: Thought content normal.         Judgment: Judgment normal.         Assessment:       1. Essential hypertension    2. Other hyperlipidemia    3. Chronic atrial fibrillation    4. Herpes zoster without complication    5. Acquired hypothyroidism    6. Primary hyperparathyroidism    7. Thrombocytopenia, unspecified    8. Other neutropenia    9. Other osteoporosis without current pathological fracture    10. BMI 22.0-22.9, adult        Plan:       1. Essential hypertension  Adequate control no changes  - amLODIPine (NORVASC) 2.5 MG tablet; Take 1 tablet (2.5 mg total) by mouth 2 (two) times daily.  Dispense: 180 tablet; Refill: 3  - atenoloL (TENORMIN) 50 MG tablet; Take 1 tablet (50 mg total) by mouth 2 (two) times daily.  Dispense: 180 tablet; Refill: 3  - lisinopriL (PRINIVIL,ZESTRIL) 40 MG tablet; Take 1 tablet (40 mg total) by mouth once daily.  Dispense: 90 tablet; Refill: 3    2. Other hyperlipidemia  Lab Results   Component Value Date    CHOL 134 06/16/2023    CHOL 126 03/10/2022    CHOL 142 03/26/2021     Lab Results   Component Value Date    HDL 66 06/16/2023    HDL 60 03/10/2022    HDL 65 03/26/2021     Lab Results   Component Value Date    LDLCALC 54.0 (L) 06/16/2023    LDLCALC 55.0 (L) 03/10/2022    LDLCALC 66.6 03/26/2021     No results found for: "DLDL"  Lab Results   Component Value " Date    TRIG 70 06/16/2023    TRIG 55 03/10/2022    TRIG 52 03/26/2021       f1   Lab Results   Component Value Date    CHOLHDL 49.3 06/16/2023    CHOLHDL 47.6 03/10/2022    CHOLHDL 45.8 03/26/2021     Adequate control with no changes needed in simvastatin 20 mg HS    3. Chronic atrial fibrillation  Stable, followed by Cardiology  Refill on Eliquis sent to pharmacy  - apixaban (ELIQUIS) 2.5 mg Tab; Take 1 tablet (2.5 mg total) by mouth 2 (two) times daily.  Dispense: 180 tablet; Refill: 3    4. Herpes zoster without complication  Very early, mildly symptomatic.  Valtrex 1000 mg 3 times daily for seven days sent to pharmacy.  Patient should consider getting the Shingrix vaccine starting in about six months  - valACYclovir (VALTREX) 1000 MG tablet; Take 1 tablet (1,000 mg total) by mouth 3 (three) times daily. for 7 days  Dispense: 21 tablet; Refill: 0    5. Acquired hypothyroidism  Lab Results   Component Value Date    TSH 3.705 06/16/2023     Good control no changes needed, refill sent to pharmacy  - levothyroxine (SYNTHROID) 112 MCG tablet; Take 0.5 tablets (56 mcg total) by mouth once daily.  Dispense: 45 tablet; Refill: 3    6. Primary hyperparathyroidism  Lab Results   Component Value Date    CALCIUM 10.6 (H) 06/16/2023    PHOS 2.9 11/27/2018     Fair control    7. Thrombocytopenia, unspecified  Lab Results   Component Value Date     (L) 11/16/2022     Stable    8. Other neutropenia  Lab Results   Component Value Date    WBC 2.80 (L) 11/16/2022     Stable    9. Other osteoporosis without current pathological fracture  Patient discontinued Fosamax 70 mg    10. BMI 22.0-22.9, adult  Good weight, encouraged  a little bit more to give her a cushion in case of illness and improve life expectancy

## 2025-01-16 ENCOUNTER — OFFICE VISIT (OUTPATIENT)
Dept: UROLOGY | Facility: CLINIC | Age: OVER 89
End: 2025-01-16
Payer: MEDICARE

## 2025-01-16 VITALS — HEIGHT: 64 IN | BODY MASS INDEX: 21.07 KG/M2 | WEIGHT: 123.44 LBS

## 2025-01-16 DIAGNOSIS — N39.0 RECURRENT UTI: Primary | ICD-10-CM

## 2025-01-16 LAB
BILIRUBIN, UA POC OHS: NEGATIVE
BLOOD, UA POC OHS: NEGATIVE
CLARITY, UA POC OHS: CLEAR
COLOR, UA POC OHS: YELLOW
GLUCOSE, UA POC OHS: NEGATIVE
KETONES, UA POC OHS: NEGATIVE
LEUKOCYTES, UA POC OHS: NEGATIVE
NITRITE, UA POC OHS: NEGATIVE
PH, UA POC OHS: 6
PROTEIN, UA POC OHS: NEGATIVE
SPECIFIC GRAVITY, UA POC OHS: 1.01
UROBILINOGEN, UA POC OHS: 0.2

## 2025-01-16 PROCEDURE — 99999 PR PBB SHADOW E&M-EST. PATIENT-LVL III: CPT | Mod: PBBFAC,,, | Performed by: UROLOGY

## 2025-01-16 PROCEDURE — 81003 URINALYSIS AUTO W/O SCOPE: CPT | Mod: PBBFAC,PO | Performed by: UROLOGY

## 2025-01-16 PROCEDURE — 99213 OFFICE O/P EST LOW 20 MIN: CPT | Mod: PBBFAC,PO | Performed by: UROLOGY

## 2025-01-16 PROCEDURE — 99999PBSHW POCT URINALYSIS(INSTRUMENT): Mod: PBBFAC,,,

## 2025-01-16 PROCEDURE — 99204 OFFICE O/P NEW MOD 45 MIN: CPT | Mod: S$PBB,,, | Performed by: UROLOGY

## 2025-01-16 RX ORDER — ESTRADIOL 0.1 MG/G
2 CREAM VAGINAL
Qty: 42.5 G | Refills: 4 | Status: SHIPPED | OUTPATIENT
Start: 2025-01-16 | End: 2026-01-16

## 2025-01-16 RX ORDER — CEPHALEXIN 250 MG/1
250 CAPSULE ORAL DAILY
Qty: 90 CAPSULE | Refills: 0 | Status: SHIPPED | OUTPATIENT
Start: 2025-01-16 | End: 2025-04-16

## 2025-01-16 NOTE — PROGRESS NOTES
[facility] Urology New Patient/H&P:    Ama Lang is a 89 y.o. female who presents for recurrent UTI.    Patient with a history of atrial fibrillation, skin cancer, diverticulitis, HTN and HLD who presents for recurrent UTI.     She has had several UTIs over the past several years treated at urgent care. States that she develops dysuria, frequency and suprapubic discomfort.     Urine dipstick negative today. CT abdomen pelvis without contrast on 8/26/22 with a 4.2 x 4.7 cm left adnexal cystic lesion of undetermined etiology. Due to size, further evaluation with pelvic ultrasound, which can be performed as an outpatient, is recommended. Left renal atrophy.    On review of records, she underwent cystoscopy with Dr. Robbins on 1/20/21 which revealed moderate trabeculations and vaginal atrophy. She was prescribed estrace cream, but unsure if she used it for a long time.     Denies any fever, chills, gross hematuria, flank pain, bone pain, unintentional weight loss,  trauma or history of  malignancy.       Urine culture  E. Coli  1/4/25  E. Coli  12/5/24  Proteus 11/23/24  E. Coli  10/28/24  E. Coli  3/17/24  No growth 2/5/24  Klebsiella 3/22/23   E. Coli  11/10/21  Multi org 10/5/21  Klebsiella 6/24/21    Past Medical History:   Diagnosis Date    *Atrial fibrillation     Dr. Mullen    Cancer     skin cancer arms     Cataract     Diverticulitis     Hyperlipidemia     Hypertension     Hypothyroidism        Past Surgical History:   Procedure Laterality Date    COLONOSCOPY  12/18/2003    Dr. Santoyo    CYSTOSCOPY  2008    Dr. Nguyen    CYSTOSCOPY N/A 1/20/2021    Procedure: CYSTOSCOPY;  Surgeon: Khushbu Robbins MD;  Location: [facility] OR;  Service: Urology;  Laterality: N/A;    DILATION AND CURETTAGE OF UTERUS      EYE SURGERY      skin cancer bilateral arms  08/2019    Dr Soler skin cancer both arms     TUBAL LIGATION         Family History   Problem Relation Name Age of Onset    Cancer Mother           "ovarian    Heart disease Father      [place]'s disease Father      Macular degeneration Father      Heart disease Brother      Cancer Sister          blood    Cancer Brother      [place]'s disease Daughter 1     Early death Daughter 1     Arthritis Sister          spine    Carpal tunnel syndrome Son      Hypertension Son      Fibromyalgia Daughter 3     [place]'s disease Daughter 3     Arthritis Daughter 3         x2    Kidney cancer Neg Hx      Prostate cancer Neg Hx      Urolithiasis Neg Hx         Review of patient's allergies indicates:   Allergen Reactions    Montelukast Other (See Comments)     Depleted sodium     Ciprofloxacin     Hydrochlorothiazide Other (See Comments)     Low sodium       Medications Reviewed: see MAR    PHYSICAL EXAM:    There were no vitals filed for this visit.  Body mass index is 21.19 kg/m². Weight: 56 kg (123 lb 7.3 oz) Height: 5' 4" (162.6 cm)       General: Alert, cooperative, no distress, appears stated age  Abdomen: Soft, non-tender, no CVA tenderness, non-distended      LABS:    Recent Results (from the past 2 weeks)   Urine Culture High Risk    Collection Time: 01/04/25  3:53 PM    Specimen: Urine, Clean Catch    URINE  Send normal res   Result Value Ref Range    Urine Culture, Routine Final report (A)     Result 1 Comment (A)     Antimicrobial Susceptibility Comment    POCT Urinalysis, Dipstick, Automated, W/O Scope    Collection Time: 01/04/25  4:53 PM   Result Value Ref Range    POC Blood, Urine Positive (A) Negative    POC Bilirubin, Urine Negative Negative    POC Urobilinogen, Urine 0.2 0.1 - 1.1    POC Ketones, Urine Negative Negative    POC Protein, Urine Positive (A) Negative    POC Nitrates, Urine Negative Negative    POC Glucose, Urine Negative Negative    pH, UA 6.0     POC Specific Gravity, Urine 1.025 1.003 - 1.029    POC Leukocytes, Urine Positive (A) Negative   POCT Urinalysis(Instrument)    Collection Time: 01/16/25 11:35 AM   Result Value Ref Range    " Color, POC UA Yellow Yellow, Straw, Colorless    Clarity, POC UA Clear Clear    Glucose, POC UA Negative Negative    Bilirubin, POC UA Negative Negative    Ketones, POC UA Negative Negative    Spec Grav POC UA 1.010 1.005 - 1.030    Blood, POC UA Negative Negative    pH, POC UA 6.0 5.0 - 8.0    Protein, POC UA Negative Negative    Urobilinogen, POC UA 0.2 <=1.0    Nitrite, POC UA Negative Negative    WBC, POC UA Negative Negative           Assessment/Diagnosis:    1. Recurrent UTI  POCT Urinalysis(Instrument)    cephALEXin (KEFLEX) 250 MG capsule    estradioL (ESTRACE) 0.01 % (0.1 mg/gram) vaginal cream          Plans:    - I spent 45 minutes of the day of this encounter preparing for, treating and managing this patient. Visit today included increased complexity associated with the care of the episodic problem addressed and managing the longitudinal care of the patient due to the serious and/or complex managed problem(s) recurrent UTI. Extensive discussion with patient regarding the etiology and management of recurrent UTI. Informed patient that UTI is multifactorial and can be secondary to nephrolithiasis, decreased estrogen levels after menopause, anatomic abnormalities, sexual intercourse, genetic predisposition, chronic urbano, constipation, medications, dehydration and urinary retention.   - Instructed patient to increase water intake, start cranberry capsules once daily, avoid pads if possible and urinate every 2 hours.   - We discussed the benefit of estrace cream placed vaginally twice weekly if no history of MI, DVT, PE, cervical, uterine or ovarian cancer.   - RX for estrace cream twice weekly.   - RX for Keflex 250 mg for 3 months for UTI prophylaxis. Understands risks.   - RTC in 6 months for symptom check.

## 2025-02-18 ENCOUNTER — OFFICE VISIT (OUTPATIENT)
Dept: URGENT CARE | Facility: CLINIC | Age: OVER 89
End: 2025-02-18
Payer: MEDICARE

## 2025-02-18 VITALS
OXYGEN SATURATION: 96 % | HEIGHT: 64 IN | WEIGHT: 123 LBS | RESPIRATION RATE: 16 BRPM | HEART RATE: 90 BPM | BODY MASS INDEX: 21 KG/M2 | TEMPERATURE: 98 F | DIASTOLIC BLOOD PRESSURE: 79 MMHG | SYSTOLIC BLOOD PRESSURE: 151 MMHG

## 2025-02-18 DIAGNOSIS — R35.0 URINARY FREQUENCY: ICD-10-CM

## 2025-02-18 DIAGNOSIS — N30.01 ACUTE CYSTITIS WITH HEMATURIA: Primary | ICD-10-CM

## 2025-02-18 LAB
BILIRUB UR QL STRIP: NEGATIVE
GLUCOSE UR QL STRIP: NEGATIVE
KETONES UR QL STRIP: NEGATIVE
LEUKOCYTE ESTERASE UR QL STRIP: POSITIVE
PH, POC UA: 6.5
POC BLOOD, URINE: POSITIVE
POC NITRATES, URINE: NEGATIVE
PROT UR QL STRIP: POSITIVE
SP GR UR STRIP: 1.01 (ref 1–1.03)
UROBILINOGEN UR STRIP-ACNC: ABNORMAL (ref 0.1–1.1)

## 2025-02-18 RX ORDER — AMOXICILLIN AND CLAVULANATE POTASSIUM 875; 125 MG/1; MG/1
1 TABLET, FILM COATED ORAL EVERY 12 HOURS
Qty: 14 TABLET | Refills: 0 | Status: SHIPPED | OUTPATIENT
Start: 2025-02-18 | End: 2025-02-25

## 2025-02-18 NOTE — PROGRESS NOTES
"Subjective:      Patient ID: Ama Lang is a 89 y.o. female.    Vitals:  height is 5' 4" (1.626 m) and weight is 55.8 kg (123 lb). Her oral temperature is 97.9 °F (36.6 °C). Her blood pressure is 151/79 (abnormal) and her pulse is 90. Her respiration is 16 and oxygen saturation is 96%.     Chief Complaint: Urinary Frequency    89-year-old female presents for evaluation of urinary frequency, dysuria, lower abdominal pain.  She does get recurrent and frequent UTIs.  She has seen Urology.  Last UTI was approximately 4 weeks ago.    Urinary Frequency   This is a new problem. Episode onset: x 3 days. The problem has been gradually worsening. Associated symptoms include frequency. Pertinent negatives include no chills or hematuria.       Constitution: Negative for chills, fatigue and fever.   Genitourinary:  Positive for dysuria and frequency. Negative for hematuria.      Objective:     Physical Exam   Constitutional: She is oriented to person, place, and time. She appears well-developed.   HENT:   Head: Normocephalic and atraumatic.   Ears:   Right Ear: External ear normal.   Left Ear: External ear normal.   Nose: Nose normal. No nasal deformity. No epistaxis.   Mouth/Throat: Oropharynx is clear and moist and mucous membranes are normal.   Eyes: Lids are normal.   Neck: Trachea normal and phonation normal. Neck supple.   Cardiovascular: Normal rate.   Pulmonary/Chest: Effort normal. No respiratory distress.   Abdominal: Normal appearance. She exhibits no distension. Soft. There is no abdominal tenderness.   Neurological: She is alert and oriented to person, place, and time. She displays no weakness.   Skin: Skin is warm, dry and intact.   Psychiatric: Her speech is normal and behavior is normal. Mood, judgment and thought content normal.   Nursing note and vitals reviewed.      Assessment:     1. Acute cystitis with hematuria    2. Urinary frequency        Plan:       Acute cystitis with hematuria  -     " amoxicillin-clavulanate 875-125mg (AUGMENTIN) 875-125 mg per tablet; Take 1 tablet by mouth every 12 (twelve) hours. for 7 days  Dispense: 14 tablet; Refill: 0  -     Urine Culture High Risk    Urinary frequency  -     POCT Urinalysis, Dipstick, Manual, W/O Scope      UA: abnormal  Urine culture sent    Discussed medication with patient who acknowledges understanding and is agreeable to POC. Follow up with primary care. Increase fluid intake. Red flags for ER discussed.

## 2025-02-23 ENCOUNTER — RESULTS FOLLOW-UP (OUTPATIENT)
Dept: URGENT CARE | Facility: CLINIC | Age: OVER 89
End: 2025-02-23

## 2025-02-24 DIAGNOSIS — N39.0 URINARY TRACT INFECTION WITHOUT HEMATURIA, SITE UNSPECIFIED: Primary | ICD-10-CM

## 2025-02-24 RX ORDER — DOXYCYCLINE HYCLATE 100 MG
100 TABLET ORAL 2 TIMES DAILY
Qty: 14 TABLET | Refills: 0 | Status: SHIPPED | OUTPATIENT
Start: 2025-02-24 | End: 2025-03-03

## 2025-02-24 NOTE — PROGRESS NOTES
Reviewed.  Patient discharged on Augmentin.  Will need change in antibiotics. Notified patient of need to change antibiotics. Verified allergies. Will send in new RX to family [facility] in [place]. All questions answered.

## 2025-03-29 NOTE — TELEPHONE ENCOUNTER
[address]  [id number]  Authorization for Surgical Operation and Procedure     Date:___________                                                                                                         Time:__________  I hereby authorize Surgeon(s):  Sacha Simmons MD, my physician and his/her assistants (if applicable), which may include medical students, residents, and/or fellows, to perform the following surgical operation/ procedure and administer such anesthesia as may be determined necessary by my physician:  Operation/Procedure name (s) Colonoscopy with possible biopsy, possible control of bleeding under monitored anesthesia care on Kyler Haji   2.   I recognize that during the surgical operation/procedure, unforeseen conditions may necessitate additional or different procedures than those listed above.  I, therefore, further authorize and request that the above-named surgeon, assistants, or designees perform such procedures as are, in their judgment, necessary and desirable.    3.   My surgeon/physician has discussed prior to my surgery the potential benefits, risks and side effects of this procedure; the likelihood of achieving goals; and potential problems that might occur during recuperation.  They also discussed reasonable alternatives to the procedure, including risks, benefits, and side effects related to the alternatives and risks related to not receiving this procedure.  I have had all my questions answered and I acknowledge that no guarantee has been made as to the result that may be obtained.    4.   Should the need arise during my operation/procedure, which includes change of level of care prior to discharge, I also consent to the administration of blood and/or blood products.  Further, I understand that despite careful testing and screening of blood or blood products by collecting agencies, I may still be subject to ill effects as a result of receiving a  No care due was identified.  [facility] Embedded Care Due Messages. Reference number: [id number].   9/09/2024 9:16:17 AM CDT   blood transfusion and/or blood products.  The following are some, but not all, of the potential risks that can occur: fever and allergic reactions, hemolytic reactions, transmission of diseases such as Hepatitis, AIDS and Cytomegalovirus (CMV) and fluid overload.  In the event that I wish to have an autologous transfusion of my own blood, or a directed donor transfusion, I will discuss this with my physician.  Check only if Refusing Blood or Blood Products  I understand refusal of blood or blood products as deemed necessary by my physician may have serious consequences to my condition to include possible death. I hereby assume responsibility for my refusal and release the hospital, its personnel, and my physicians from any responsibility for the consequences of my refusal.          o  Refuse      5.   I authorize the use of any specimen, organs, tissues, body parts or foreign objects that may be removed from my body during the operation/procedure for diagnosis, research or teaching purposes and their subsequent disposal by hospital authorities.  I also authorize the release of specimen test results and/or written reports to my treating physician on the hospital medical staff or other referring or consulting physicians involved in my care, at the discretion of the Pathologist or my treating physician.    6.   I consent to the photographing or videotaping of the operations or procedures to be performed, including appropriate portions of my body for medical, scientific, or educational purposes, provided my identity is not revealed by the pictures or by descriptive texts accompanying them.  If the procedure has been photographed/videotaped, the surgeon will obtain the original picture, image, videotape or CD.  The hospital will not be responsible for storage, release or maintenance of the picture, image, tape or CD.    7.   I consent to the presence of a  or observers in the operating room as deemed  necessary by my physician or their designees.    8.   I recognize that in the event my procedure results in extended X-Ray/fluoroscopy time, I may develop a skin reaction.    9. If I have a Do Not Attempt Resuscitation (DNAR) order in place, that status will be suspended while in the operating room, procedural suite, and during the recovery period unless otherwise explicitly stated by me (or a person authorized to consent on my behalf). The surgeon or my attending physician will determine when the applicable recovery period ends for purposes of reinstating the DNAR order.  10. Patients having a sterilization procedure: I understand that if the procedure is successful the results will be permanent and it will therefore be impossible for me to inseminate, conceive, or bear children.  I also understand that the procedure is intended to result in sterility, although the result has not been guaranteed.   11. I acknowledge that my physician has explained sedation/analgesia administration to me including the risk and benefits I consent to the administration of sedation/analgesia as may be necessary or desirable in the judgment of my physician.    I CERTIFY THAT I HAVE READ AND FULLY UNDERSTAND THE ABOVE CONSENT TO OPERATION and/or OTHER PROCEDURE.    _________________________________________  __________________________________  Signature of Patient     Signature of Responsible Person         ___________________________________         Printed Name of Responsible Person           _________________________________                 Relationship to Patient  _________________________________________  ______________________________  Signature of Witness          Date  Time      Patient Name: Kyler Haji     : 1939                 Printed: 2025     Medical Record #: [id number]                     Page 1 of 2                                    [address]   [id number]    Consent for Anesthesia    I, Kyler Haji agree to be cared for by an anesthesiologist, who is specially trained to monitor me and give me medicine to put me to sleep or keep me comfortable during my procedure    I understand that my anesthesiologist is not an employee or agent of [facility] or [employer]. He or she works for [employer].    As the patient asking for anesthesia services, I agree to:  Allow the anesthesiologist (anesthesia doctor) to give me medicine and do additional procedures as necessary. Some examples are: Starting or using an “IV” to give me medicine, fluids or blood during my procedure, and having a breathing tube placed to help me breathe when I’m asleep (intubation). In the event that my heart stops working properly, I understand that my anesthesiologist will make every effort to sustain my life, unless otherwise directed by [facility] Do Not Resuscitate documents.  Tell my anesthesia doctor before my procedure:  If I am pregnant.  The last time that I ate or drank.  All of the medicines I take (including prescriptions, herbal supplements, and pills I can buy without a prescription (including street drugs/illegal medications). Failure to inform my anesthesiologist about these medicines may increase my risk of anesthetic complications.  If I am allergic to anything or have had a reaction to anesthesia before.  I understand how the anesthesia medicine will help me (benefits).  I understand that with any type of anesthesia medicine there are risks:  The most common risks are: nausea, vomiting, sore throat, muscle soreness, damage to my eyes, mouth, or teeth (from breathing tube placement).  Rare risks include: remembering what happened during my procedure, allergic reactions to medications, injury to my airway, heart, lungs, vision, nerves, or muscles and in extremely rare instances death.  My doctor has explained to me other choices available  to me for my care (alternatives).  Pregnant Patients (“epidural”):  I understand that the risks of having an epidural (medicine given into my back to help control pain during labor), include itching, low blood pressure, difficulty urinating, headache or slowing of the baby’s heart. Very rare risks include infection, bleeding, seizure, irregular heart rhythms and nerve injury.  Regional Anesthesia (“spinal”, “epidural”, & “nerve blocks”):  I understand that rare but potential complications include headache, bleeding, infection, seizure, irregular heart rhythms, and nerve injury.    I can change my mind about having anesthesia services at any time before I get the medicine.    _____________________________________________________________________________  Patient (or Representative) Signature/Relationship to Patient  Date   Time    _____________________________________________________________________________   Name (if used)    Language/Organization   Time    _____________________________________________________________________________  Anesthesiologist Signature     Date   Time  I have discussed the procedure and information above with the patient (or patient’s representative) and answered their questions. The patient or their representative has agreed to have anesthesia services.    _____________________________________________________________________________  Witness        Date   Time  I have verified that the signature is that of the patient or patient’s representative, and that it was signed before the procedure  Patient Name: Kyler Haji     : 1939                 Printed: 2025     Medical Record #: [id number]                     Page 2 of 2

## 2025-04-23 ENCOUNTER — OFFICE VISIT (OUTPATIENT)
Dept: URGENT CARE | Facility: CLINIC | Age: OVER 89
End: 2025-04-23
Payer: MEDICARE

## 2025-04-23 VITALS
SYSTOLIC BLOOD PRESSURE: 148 MMHG | HEIGHT: 64 IN | WEIGHT: 123 LBS | DIASTOLIC BLOOD PRESSURE: 81 MMHG | TEMPERATURE: 98 F | HEART RATE: 65 BPM | OXYGEN SATURATION: 97 % | BODY MASS INDEX: 21 KG/M2 | RESPIRATION RATE: 12 BRPM

## 2025-04-23 DIAGNOSIS — N39.0 URINARY TRACT INFECTION WITHOUT HEMATURIA, SITE UNSPECIFIED: Primary | ICD-10-CM

## 2025-04-23 DIAGNOSIS — R35.0 FREQUENCY OF URINATION: ICD-10-CM

## 2025-04-23 LAB
BILIRUB UR QL STRIP: NEGATIVE
GLUCOSE UR QL STRIP: NEGATIVE
KETONES UR QL STRIP: NEGATIVE
LEUKOCYTE ESTERASE UR QL STRIP: POSITIVE
PH, POC UA: 6
POC BLOOD, URINE: POSITIVE
POC NITRATES, URINE: NEGATIVE
PROT UR QL STRIP: NEGATIVE
SP GR UR STRIP: 1.02 (ref 1–1.03)
UROBILINOGEN UR STRIP-ACNC: ABNORMAL (ref 0.1–1.1)

## 2025-04-23 RX ORDER — CEFUROXIME AXETIL 250 MG/1
250 TABLET ORAL EVERY 12 HOURS
Qty: 10 TABLET | Refills: 0 | Status: SHIPPED | OUTPATIENT
Start: 2025-04-23 | End: 2025-04-23

## 2025-04-23 RX ORDER — CEFUROXIME AXETIL 250 MG/1
250 TABLET ORAL EVERY 12 HOURS
Qty: 10 TABLET | Refills: 0 | Status: SHIPPED | OUTPATIENT
Start: 2025-04-23 | End: 2025-04-28

## 2025-04-23 RX ORDER — PHENAZOPYRIDINE HYDROCHLORIDE 200 MG/1
200 TABLET, FILM COATED ORAL 3 TIMES DAILY PRN
Qty: 6 TABLET | Refills: 0 | Status: SHIPPED | OUTPATIENT
Start: 2025-04-23 | End: 2025-05-03

## 2025-04-23 NOTE — PROGRESS NOTES
"Subjective:      Patient ID: Ama Lang is a 89 y.o. female.    Vitals:  height is 5' 4" (1.626 m) and weight is 55.8 kg (123 lb). Her oral temperature is 98.3 °F (36.8 °C). Her blood pressure is 148/81 (abnormal) and her pulse is 65. Her respiration is 12 and oxygen saturation is 97%.     Chief Complaint: Urinary Frequency    Ama Lang is a 89 year old female presenting to the ED with c/o urinary frequency and dysuria. Reports symptoms began yesterday. She has had no fever, vomiting and has been able to tolerate PO intake.      Urinary Frequency   Episode onset: x 3 days. The quality of the pain is described as burning. Associated symptoms include flank pain, frequency and urgency.       Constitution: Negative.   HENT: Negative.     Neck: neck negative.   Cardiovascular: Negative.    Eyes: Negative.    Respiratory: Negative.     Gastrointestinal: Negative.    Genitourinary:  Positive for frequency, urgency and flank pain.   Skin: Negative.    Neurological: Negative.       Objective:     Physical Exam   Constitutional: She is oriented to person, place, and time. She appears well-developed. She is cooperative.   HENT:   Head: Normocephalic and atraumatic.   Ears:   Right Ear: Hearing and external ear normal.   Left Ear: Hearing and external ear normal.   Nose: Nose normal. No mucosal edema or nasal deformity. No epistaxis. Right sinus exhibits no maxillary sinus tenderness and no frontal sinus tenderness. Left sinus exhibits no maxillary sinus tenderness and no frontal sinus tenderness.   Mouth/Throat: Uvula is midline, oropharynx is clear and moist and mucous membranes are normal. No trismus in the jaw. Normal dentition. No uvula swelling.   Eyes: Conjunctivae and lids are normal.   Neck: Trachea normal and phonation normal. Neck supple.   Cardiovascular: Normal rate, regular rhythm, normal heart sounds and normal pulses.   Pulmonary/Chest: Effort normal and breath sounds normal.   Abdominal: Normal " appearance and bowel sounds are normal. Soft. There is abdominal tenderness (mild suprapubic). There is no guarding.   Musculoskeletal: Normal range of motion.         General: Normal range of motion.      Comments: No CVA tenderness to percussion   Neurological: She is alert and oriented to person, place, and time. She exhibits normal muscle tone.   Skin: Skin is warm, dry and intact.   Psychiatric: Her speech is normal and behavior is normal. Judgment and thought content normal.   Nursing note and vitals reviewed.      Assessment:     1. Urinary tract infection without hematuria, site unspecified    2. Frequency of urination        Plan:     The patient's symptoms are consistent with a urinary tract infection.  The patient does not appear to have pyelonephritis, urinary retention, ureterolithiasis, or urosepsis.  The patient will be treated with antibiotics as an outpatient and has been given specific return precautions.     Reviewed patient's prior urine cultures and will start Ceftin based on sensitivities. Advised her that we may need to change antibiotic once culture results. Strict ED precautions discussed for any worsening symptoms. Patient verbalized understanding of all instructions.     Urinary tract infection without hematuria, site unspecified    Frequency of urination  -     POCT Urinalysis, Dipstick, Manual, W/O Scope  -     Urine Culture, Routine    Other orders  -     Discontinue: cefUROXime (CEFTIN) 250 MG tablet; Take 1 tablet (250 mg total) by mouth every 12 (twelve) hours. for 5 days  Dispense: 10 tablet; Refill: 0  -     cefUROXime (CEFTIN) 250 MG tablet; Take 1 tablet (250 mg total) by mouth every 12 (twelve) hours. for 5 days  Dispense: 10 tablet; Refill: 0  -     phenazopyridine (PYRIDIUM) 200 MG tablet; Take 1 tablet (200 mg total) by mouth 3 (three) times daily as needed for Pain.  Dispense: 6 tablet; Refill: 0

## 2025-04-30 ENCOUNTER — RESULTS FOLLOW-UP (OUTPATIENT)
Dept: URGENT CARE | Facility: CLINIC | Age: OVER 89
End: 2025-04-30

## 2025-04-30 DIAGNOSIS — B96.20 E. COLI UTI (URINARY TRACT INFECTION): ICD-10-CM

## 2025-04-30 DIAGNOSIS — N39.0 E. COLI UTI (URINARY TRACT INFECTION): ICD-10-CM

## 2025-04-30 DIAGNOSIS — Z16.24 MULTIPLE DRUG RESISTANT ORGANISM (MDRO) CULTURE POSITIVE: Primary | ICD-10-CM

## 2025-04-30 RX ORDER — DOXYCYCLINE HYCLATE 100 MG
100 TABLET ORAL 2 TIMES DAILY
Qty: 14 TABLET | Refills: 0 | Status: SHIPPED | OUTPATIENT
Start: 2025-04-30 | End: 2025-05-07

## 2025-04-30 NOTE — PROGRESS NOTES
Patient with MDRO E coli UTI with multiple resistances.  Susceptible to doxycycline, Cipro, Levaquin, Macrobid, and Bactrim.  Patient with creatinine clearance of 40 mL/min.  With this will initiate patient on doxycycline twice a day for 7 days.  Patient should discontinue previously prescribed Ceftin as resistant.  Patient to follow-up closely with PCP or return to clinic as needed.

## 2025-05-18 DIAGNOSIS — I10 ESSENTIAL HYPERTENSION: ICD-10-CM

## 2025-05-19 RX ORDER — ATENOLOL 50 MG/1
50 TABLET ORAL 2 TIMES DAILY
Qty: 60 TABLET | Refills: 0 | Status: SHIPPED | OUTPATIENT
Start: 2025-05-19

## 2025-06-11 ENCOUNTER — OFFICE VISIT (OUTPATIENT)
Dept: URGENT CARE | Facility: CLINIC | Age: OVER 89
End: 2025-06-11
Payer: MEDICARE

## 2025-06-11 VITALS
TEMPERATURE: 98 F | HEIGHT: 64 IN | HEART RATE: 85 BPM | OXYGEN SATURATION: 98 % | BODY MASS INDEX: 21 KG/M2 | SYSTOLIC BLOOD PRESSURE: 156 MMHG | DIASTOLIC BLOOD PRESSURE: 77 MMHG | WEIGHT: 123 LBS

## 2025-06-11 DIAGNOSIS — L21.9 SEBORRHEIC DERMATITIS OF SCALP: ICD-10-CM

## 2025-06-11 DIAGNOSIS — L30.9 ECZEMA, UNSPECIFIED TYPE: ICD-10-CM

## 2025-06-11 DIAGNOSIS — R30.0 DYSURIA: Primary | ICD-10-CM

## 2025-06-11 LAB
BILIRUB UR QL STRIP: NEGATIVE
GLUCOSE UR QL STRIP: NEGATIVE
KETONES UR QL STRIP: NEGATIVE
LEUKOCYTE ESTERASE UR QL STRIP: POSITIVE
PH, POC UA: 6
POC BLOOD, URINE: NEGATIVE
POC NITRATES, URINE: NEGATIVE
PROT UR QL STRIP: NEGATIVE
SP GR UR STRIP: 1.01 (ref 1–1.03)
UROBILINOGEN UR STRIP-ACNC: 0.2 (ref 0.1–1.1)

## 2025-06-11 PROCEDURE — 81003 URINALYSIS AUTO W/O SCOPE: CPT | Mod: QW,S$GLB,,

## 2025-06-11 PROCEDURE — 99214 OFFICE O/P EST MOD 30 MIN: CPT | Mod: S$GLB,,,

## 2025-06-11 RX ORDER — KETOCONAZOLE 20 MG/ML
SHAMPOO, SUSPENSION TOPICAL
Qty: 120 ML | Refills: 0 | Status: SHIPPED | OUTPATIENT
Start: 2025-06-12 | End: 2025-06-26

## 2025-06-11 RX ORDER — CEPHALEXIN 250 MG/1
250 CAPSULE ORAL
COMMUNITY

## 2025-06-11 RX ORDER — HYDROXYZINE HYDROCHLORIDE 25 MG/1
25 TABLET, FILM COATED ORAL 3 TIMES DAILY PRN
Qty: 21 TABLET | Refills: 0 | Status: SHIPPED | OUTPATIENT
Start: 2025-06-11 | End: 2025-06-18

## 2025-06-11 RX ORDER — DOXYCYCLINE 100 MG/1
100 CAPSULE ORAL 2 TIMES DAILY
Qty: 14 CAPSULE | Refills: 0 | Status: SHIPPED | OUTPATIENT
Start: 2025-06-11 | End: 2025-06-18

## 2025-06-11 RX ORDER — ASPIRIN 81 MG/1
1 TABLET ORAL EVERY MORNING
COMMUNITY

## 2025-06-11 NOTE — PATIENT INSTRUCTIONS
Use the ketoconazole shampoo several times throughout the week.  May use up to 14 days.  If no improvement in symptoms return to clinic or follow up with the primary care doctor.    Use an emollient such as Aveeno, Eucerin, or tummy Barry as directed immediately after taking a shower.  You can use an oral antihistamines such as Claritin, or Zyrtec for itching.  Do not use antihistamines and Atarax together.  Atarax may cause sedation use with caution.

## 2025-06-11 NOTE — PROGRESS NOTES
"Subjective:      Patient ID: Ama Lang is a 89 y.o. female.    Vitals:  height is 5' 4" (1.626 m) and weight is 55.8 kg (123 lb). Her oral temperature is 98.2 °F (36.8 °C). Her blood pressure is 156/77 (abnormal) and her pulse is 85. Her oxygen saturation is 98%.     Chief Complaint: Rash    Patient with a history of eczema and recurrent UTIs presenting to clinic with a complaint of diffuse itching for several months, and scalp itching for a few days.  Also reports urinary symptoms that began last night.  Denies fever, constipation, diarrhea, chest pain, sore throat, or body aches.  She states she had a lotion that she was using topically the did not improve her symptoms but she can not recall the name.    Rash  Pertinent negatives include no cough, diarrhea, fever, sore throat or vomiting.       Constitution: Negative for fever.   HENT:  Negative for sore throat.    Respiratory:  Negative for cough.    Gastrointestinal:  Negative for nausea, vomiting, constipation and diarrhea.   Genitourinary:  Positive for dysuria, frequency and urgency.   Skin:  Positive for rash.   Allergic/Immunologic: Positive for eczema.      Objective:     Physical Exam   Constitutional: She is oriented to person, place, and time. She is cooperative.   HENT:   Head: Normocephalic and atraumatic.   Ears:   Right Ear: External ear normal.   Left Ear: External ear normal.      Comments: Exquisitely hard of hearing  Nose: Nose normal.   Mouth/Throat: Uvula is midline, oropharynx is clear and moist and mucous membranes are normal. Mucous membranes are moist. Oropharynx is clear.   Eyes: Conjunctivae and lids are normal. Pupils are equal, round, and reactive to light. Extraocular movement intact   Neck: Trachea normal and phonation normal. Neck supple.   Cardiovascular: Normal rate, regular rhythm, normal heart sounds and normal pulses.   Pulmonary/Chest: Effort normal and breath sounds normal. No stridor. She has no wheezes. She has no " rhonchi. She has no rales.   Abdominal: Normal appearance. Soft. flat abdomen There is no abdominal tenderness. There is no left CVA tenderness and no right CVA tenderness.   Neurological: no focal deficit. She is alert, oriented to person, place, and time and at baseline. She has normal motor skills, normal sensation and intact cranial nerves (2-12). Gait and coordination normal. GCS eye subscore is 4. GCS verbal subscore is 5. GCS motor subscore is 6.   Skin: Skin is warm, dry and no rash. Capillary refill takes 2 to 3 seconds.         Comments: No obvious or apparent rashes.  Scattered bruises in different stages of healing.   Psychiatric: She experiences Normal attention and Normal perception. Her speech is normal and behavior is normal. Mood, judgment and thought content normal.   Nursing note and vitals reviewed.      Assessment:     1. Dysuria    2. Eczema, unspecified type    3. Seborrheic dermatitis of scalp        Plan:       Dysuria  -     POCT Urinalysis, Dipstick, Automated, W/O Scope  -     Urine Culture, Routine    Eczema, unspecified type  -     hydrOXYzine HCL (ATARAX) 25 MG tablet; Take 1 tablet (25 mg total) by mouth 3 (three) times daily as needed for Itching.  Dispense: 21 tablet; Refill: 0    Seborrheic dermatitis of scalp  -     ketoconazole (NIZORAL) 2 % shampoo; Apply topically twice a week. for 14 days  Dispense: 120 mL; Refill: 0  -     hydrOXYzine HCL (ATARAX) 25 MG tablet; Take 1 tablet (25 mg total) by mouth 3 (three) times daily as needed for Itching.  Dispense: 21 tablet; Refill: 0    Other orders  -     doxycycline (VIBRAMYCIN) 100 MG Cap; Take 1 capsule (100 mg total) by mouth 2 (two) times daily. for 7 days  Dispense: 14 capsule; Refill: 0          Medical Decision Making:   History:   Old Medical Records: I decided to obtain old medical records.  Old Records Summarized: records from clinic visits.  Initial Assessment:   Physical exam of rash no apparent rash on my overall  impression.  Her scalp does have scattered areas of plaques with flaking and scaling skins consistent with seborrheic dermatitis.  Place patient on ketoconazole shampoo several times weekly for use of 2 weeks.  I have also instructed to use an emollient for pruritus and history of eczema.  I have also prescribed them Atarax to manage the itching.  Urine cultures previously reviewed with multi drug resistant organism susceptible to doxy.  We will repeat doxy.  Specific return and follow up instructions discussed.  Differential Diagnosis:   Scabies, shingles, contact dermatitis

## 2025-06-12 ENCOUNTER — TELEPHONE (OUTPATIENT)
Dept: URGENT CARE | Facility: CLINIC | Age: OVER 89
End: 2025-06-12
Payer: MEDICARE

## 2025-06-12 NOTE — TELEPHONE ENCOUNTER
Patients family called with concerns that the hydroxyzine prescribed yesterday caused her to have an upset stomach and difficulty sleeping. They have discontinued the medication and would like it to be placed on her allergies list so she doesn't get this medication again.

## 2025-06-16 ENCOUNTER — TELEPHONE (OUTPATIENT)
Dept: FAMILY MEDICINE | Facility: CLINIC | Age: OVER 89
End: 2025-06-16
Payer: MEDICARE

## 2025-06-16 ENCOUNTER — RESULTS FOLLOW-UP (OUTPATIENT)
Dept: URGENT CARE | Facility: CLINIC | Age: OVER 89
End: 2025-06-16
Payer: MEDICARE

## 2025-06-16 DIAGNOSIS — E78.2 MIXED HYPERLIPIDEMIA: ICD-10-CM

## 2025-06-16 DIAGNOSIS — E78.49 OTHER HYPERLIPIDEMIA: ICD-10-CM

## 2025-06-16 DIAGNOSIS — N39.0 URINARY TRACT INFECTION WITHOUT HEMATURIA, SITE UNSPECIFIED: Primary | ICD-10-CM

## 2025-06-16 RX ORDER — NITROFURANTOIN 25; 75 MG/1; MG/1
100 CAPSULE ORAL 2 TIMES DAILY WITH MEALS
Qty: 14 CAPSULE | Refills: 0 | Status: SHIPPED | OUTPATIENT
Start: 2025-06-16 | End: 2025-06-23

## 2025-06-16 RX ORDER — SIMVASTATIN 20 MG/1
20 TABLET, FILM COATED ORAL NIGHTLY
Qty: 60 TABLET | Refills: 0 | Status: SHIPPED | OUTPATIENT
Start: 2025-06-16

## 2025-06-16 RX ORDER — SIMVASTATIN 20 MG/1
20 TABLET, FILM COATED ORAL NIGHTLY
Qty: 90 TABLET | Refills: 0 | Status: SHIPPED | OUTPATIENT
Start: 2025-06-16 | End: 2025-06-16 | Stop reason: SDUPTHER

## 2025-06-16 NOTE — TELEPHONE ENCOUNTER
Spoke with Bertrand she is not at the patient's home at the moment.  She will call back once she is there to see who the patient wants to change PCP to.

## 2025-06-16 NOTE — TELEPHONE ENCOUNTER
Copied from CRM #[id number]. Topic: General Inquiry - Patient Advice  >> Jun 13, 2025 11:57 AM Bipin Pacheco wrote:  Type:  Needs Medical Advice    Who Called: pt's daughter in law - Vera    Regarding (please be specific): Establish Care with New PCP     Would the patient rather a call back or a response via [facility]? Call back    Best Call Back Number: [phone] / Bertrand's cell    Additional Information: Pt's previous PCP has retired; pt needs a new PCP to establish care; call back to advise.

## 2025-06-16 NOTE — TELEPHONE ENCOUNTER
Copied from CRM #[id number]. Topic: Medications - Medication Refill  >> Jun 13, 2025 11:39 AM Bipin Junior wrote:  Type:  RX Refill Request    Who Called: pt's daughter in law - Vera    Refill or New Rx:New RX    RX Name and Strength:simvastatin (ZOCOR) 20 MG tablet    How is the patient currently taking it? (ex. 1XDay):as directed    Is this a 30 day or 90 day RX:90      Preferred Pharmacy with phone number:  [facility] - [place], [place] - [id number] [facility] [address]  [id number] [facility] [address]  [place] [address]  Phone: [phone] Fax: [phone]      Local or Mail Order:local     Ordering Provider:EDITH    Would the patient rather a call back or a response via [facility]? Call back    Best Call Back Number:[phone]    Additional Information: Pt has 5 tab emerg supply from pharm

## 2025-06-24 DIAGNOSIS — I10 ESSENTIAL HYPERTENSION: ICD-10-CM

## 2025-06-24 RX ORDER — ATENOLOL 50 MG/1
50 TABLET ORAL 2 TIMES DAILY
Qty: 60 TABLET | Refills: 5 | Status: SHIPPED | OUTPATIENT
Start: 2025-06-24

## 2025-07-18 ENCOUNTER — OFFICE VISIT (OUTPATIENT)
Dept: URGENT CARE | Facility: CLINIC | Age: OVER 89
End: 2025-07-18
Payer: MEDICARE

## 2025-07-18 VITALS
OXYGEN SATURATION: 97 % | WEIGHT: 123 LBS | TEMPERATURE: 98 F | RESPIRATION RATE: 16 BRPM | BODY MASS INDEX: 21 KG/M2 | SYSTOLIC BLOOD PRESSURE: 164 MMHG | HEIGHT: 64 IN | HEART RATE: 77 BPM | DIASTOLIC BLOOD PRESSURE: 77 MMHG

## 2025-07-18 DIAGNOSIS — R10.30 PAIN RADIATING TO LOWER ABDOMEN: ICD-10-CM

## 2025-07-18 DIAGNOSIS — R35.0 URINARY FREQUENCY: ICD-10-CM

## 2025-07-18 DIAGNOSIS — N39.0 RECURRENT UTI (URINARY TRACT INFECTION): Primary | ICD-10-CM

## 2025-07-18 LAB
BILIRUB UR QL STRIP: NEGATIVE
GLUCOSE UR QL STRIP: NEGATIVE
KETONES UR QL STRIP: NEGATIVE
LEUKOCYTE ESTERASE UR QL STRIP: NEGATIVE
PH, POC UA: 6
POC BLOOD, URINE: NEGATIVE
POC NITRATES, URINE: NEGATIVE
PROT UR QL STRIP: NEGATIVE
SP GR UR STRIP: 1.01 (ref 1–1.03)
UROBILINOGEN UR STRIP-ACNC: NORMAL (ref 0.1–1.1)

## 2025-07-18 RX ORDER — NITROFURANTOIN 25; 75 MG/1; MG/1
100 CAPSULE ORAL 2 TIMES DAILY
Qty: 10 CAPSULE | Refills: 0 | Status: SHIPPED | OUTPATIENT
Start: 2025-07-18 | End: 2025-07-23

## 2025-07-18 NOTE — PROGRESS NOTES
"Subjective:      Patient ID: Ama Lang is a 89 y.o. female.    Vitals:  height is 5' 4" (1.626 m) and weight is 55.8 kg (123 lb). Her oral temperature is 98 °F (36.7 °C). Her blood pressure is 164/77 (abnormal) and her pulse is 77. Her respiration is 16 and oxygen saturation is 97%.     Chief Complaint: Urinary Frequency    Recurrent UTIs.  Last UTI was approximately 6 she has not appointment with a new primary care in 10 days.  Her symptoms this time include urinary frequency, "not feeling well" and low back and low abdomen pain and pressure.  Denies fever, chills, flank pain or dysuria.    Urinary Frequency   Episode onset: x 2 days. Associated symptoms include frequency and urgency. Pertinent negatives include no chills or flank pain. Associated symptoms comments: Frequency, abdominal pain and back pain.       Constitution: Negative for chills, fatigue and fever.   Gastrointestinal:  Positive for abdominal pain (pain/pressure).   Genitourinary:  Positive for frequency and urgency. Negative for flank pain.      Objective:     Physical Exam   Constitutional: She is oriented to person, place, and time. She appears well-developed.   HENT:   Head: Normocephalic and atraumatic.   Nose: No nasal deformity. No epistaxis.   Mouth/Throat: Oropharynx is clear and moist and mucous membranes are normal.   Eyes: Lids are normal.   Neck: Trachea normal and phonation normal. Neck supple.   Cardiovascular: Normal rate.   Pulmonary/Chest: Effort normal. No respiratory distress.   Abdominal: Normal appearance and bowel sounds are normal. She exhibits no distension. Soft. There is no abdominal tenderness.   Musculoskeletal: Normal range of motion.         General: Normal range of motion.   Neurological: She is alert and oriented to person, place, and time. She displays no weakness.   Skin: Skin is warm, dry and intact.   Psychiatric: Her speech is normal and behavior is normal.   Nursing note and vitals " reviewed.      Assessment:     1. Recurrent UTI (urinary tract infection)    2. Urinary frequency    3. Pain radiating to lower abdomen        Plan:       Recurrent UTI (urinary tract infection)    Urinary frequency  -     POCT Urinalysis, Dipstick, Manual, W/O Scope  -     Urine culture  -     Ambulatory referral/consult to Urology  -     nitrofurantoin, macrocrystal-monohydrate, (MACROBID) 100 MG capsule; Take 1 capsule (100 mg total) by mouth 2 (two) times daily. for 5 days  Dispense: 10 capsule; Refill: 0    Pain radiating to lower abdomen      UA:  Normal  Urine culture sent    Antibiotics were sent to the pharmacy and patient instructed if symptoms worsen over the next 24 hours she may start them however I would suggest waiting until the urine culture comes back due to multidrug resistant organisms in her urine over the last several cultures.  I have put in a new referral to Urology to see Dr. Hatfield she was supposed to have an appointment today but canceled several days ago as she has been feeling fine recently.    Discussed medication with patient and son-in-law  who acknowledges understanding and is agreeable to POC. Follow up with primary care. Increase fluid intake. Red flags for ER discussed.

## 2025-07-28 ENCOUNTER — OFFICE VISIT (OUTPATIENT)
Dept: FAMILY MEDICINE | Facility: CLINIC | Age: OVER 89
End: 2025-07-28
Payer: MEDICARE

## 2025-07-28 ENCOUNTER — LAB VISIT (OUTPATIENT)
Dept: LAB | Facility: HOSPITAL | Age: OVER 89
End: 2025-07-28
Attending: NURSE PRACTITIONER
Payer: MEDICARE

## 2025-07-28 VITALS
HEART RATE: 92 BPM | DIASTOLIC BLOOD PRESSURE: 80 MMHG | SYSTOLIC BLOOD PRESSURE: 152 MMHG | BODY MASS INDEX: 22.96 KG/M2 | WEIGHT: 124.75 LBS | RESPIRATION RATE: 20 BRPM | HEIGHT: 62 IN | TEMPERATURE: 98 F

## 2025-07-28 DIAGNOSIS — E78.2 MIXED HYPERLIPIDEMIA: ICD-10-CM

## 2025-07-28 DIAGNOSIS — E03.9 ACQUIRED HYPOTHYROIDISM: ICD-10-CM

## 2025-07-28 DIAGNOSIS — I10 ESSENTIAL HYPERTENSION: ICD-10-CM

## 2025-07-28 DIAGNOSIS — J31.0 CHRONIC RHINITIS: ICD-10-CM

## 2025-07-28 DIAGNOSIS — E78.2 MIXED HYPERLIPIDEMIA: Primary | ICD-10-CM

## 2025-07-28 DIAGNOSIS — I48.20 CHRONIC ATRIAL FIBRILLATION: ICD-10-CM

## 2025-07-28 LAB
CHOLEST SERPL-MCNC: 129 MG/DL (ref 120–199)
CHOLEST/HDLC SERPL: 2.1 {RATIO} (ref 2–5)
HDLC SERPL-MCNC: 62 MG/DL (ref 40–75)
HDLC SERPL: 48.1 % (ref 20–50)
LDLC SERPL CALC-MCNC: 52.8 MG/DL (ref 63–159)
NONHDLC SERPL-MCNC: 67 MG/DL
T4 FREE SERPL-MCNC: 0.88 NG/DL (ref 0.71–1.51)
TRIGL SERPL-MCNC: 71 MG/DL (ref 30–150)
TSH SERPL-ACNC: 3.01 UIU/ML (ref 0.4–4)

## 2025-07-28 PROCEDURE — 84439 ASSAY OF FREE THYROXINE: CPT

## 2025-07-28 PROCEDURE — 99214 OFFICE O/P EST MOD 30 MIN: CPT | Mod: S$PBB,,, | Performed by: NURSE PRACTITIONER

## 2025-07-28 PROCEDURE — 80061 LIPID PANEL: CPT

## 2025-07-28 PROCEDURE — 84443 ASSAY THYROID STIM HORMONE: CPT

## 2025-07-28 PROCEDURE — 36415 COLL VENOUS BLD VENIPUNCTURE: CPT | Mod: PN

## 2025-07-28 PROCEDURE — 99214 OFFICE O/P EST MOD 30 MIN: CPT | Mod: PBBFAC,PN | Performed by: NURSE PRACTITIONER

## 2025-07-28 PROCEDURE — 99999 PR PBB SHADOW E&M-EST. PATIENT-LVL IV: CPT | Mod: PBBFAC,,, | Performed by: NURSE PRACTITIONER

## 2025-07-28 RX ORDER — AMLODIPINE BESYLATE 2.5 MG/1
2.5 TABLET ORAL 2 TIMES DAILY
Qty: 180 TABLET | Refills: 1 | Status: SHIPPED | OUTPATIENT
Start: 2025-07-28 | End: 2026-01-24

## 2025-07-28 RX ORDER — AZELASTINE 1 MG/ML
1 SPRAY, METERED NASAL 2 TIMES DAILY PRN
Qty: 30 ML | Refills: 11 | Status: SHIPPED | OUTPATIENT
Start: 2025-07-28 | End: 2026-07-28

## 2025-07-28 RX ORDER — LEVOTHYROXINE SODIUM 112 UG/1
56 TABLET ORAL
Qty: 45 TABLET | Refills: 3 | Status: SHIPPED | OUTPATIENT
Start: 2025-07-28

## 2025-08-01 NOTE — PROGRESS NOTES
Patient ID: Ama Lang is a 89 y.o. female.    Chief Complaint: Establish Care (89 yo female here to establish care with PCP. Pt states hx of UTI and rash on scalp but current tx. KM)    History of Present Illness    CHIEF COMPLAINT:  Ms. Lang presents for a follow-up visit and medication refills.    HPI:  Ms. Lang is a 89-year-old woman who reports feeling fatigued and tired. This symptom was previously reported to Dr. Fulton, who attributed it to thyroid issues. She manages her fatigue by resting in bed or sitting down until she feels better, which has been effective for her. She reports occasional knee pain and swelling, though it was not swollen during this visit. She has a scalp condition, which appears to be improving with the use of special shampoo and prescribed treatment. She is evaluated by a cardiologist, Dr. Gautam, and has an upcoming appointment with him in August. She reports having good appetite and regular daily bowel movements. She denies experiencing any pain currently.    MEDICATIONS:  Ms. Lang is on thyroid medication and blood pressure medication.    MEDICAL HISTORY:  Ms. Lang has a history of thyroid condition and hypertension.    FAMILY HISTORY:  Family history is significant for mother who passed away at age 58 and her grandmother who passed away at age 66.    TEST RESULTS:  She underwent labs in November of last year. Ms. Lang's thyroid test was performed, and the doctor mentioned that her thyroid was balanced.    SOCIAL HISTORY:  Mandaeism: Anabaptist      ROS:  General: -fever, -chills, +fatigue, -weight gain, -weight loss  Eyes: -vision changes, -redness, -discharge  ENT: -ear pain, -nasal congestion, -sore throat  Cardiovascular: -chest pain, -palpitations, -lower extremity edema  Respiratory: -cough, -shortness of breath  Gastrointestinal: -abdominal pain, -nausea, -vomiting, -diarrhea, -constipation, -blood in stool, +increased appetite  Genitourinary:  "-dysuria, -hematuria, -frequency  Musculoskeletal: +joint pain, -muscle pain, +joint swelling  Skin: -rash, -lesion  Neurological: -headache, -dizziness, -numbness, -tingling  Psychiatric: -anxiety, -depression, -sleep difficulty             Past Medical History:   Diagnosis Date    *Atrial fibrillation     Dr. Mullen    Cancer     skin cancer arms     Cataract     Diverticulitis     Hyperlipidemia     Hypertension     Hypothyroidism      Past Surgical History:   Procedure Laterality Date    COLONOSCOPY  12/18/2003    Dr. Santoyo    CYSTOSCOPY  2008    Dr. Nguyen    CYSTOSCOPY N/A 1/20/2021    Procedure: CYSTOSCOPY;  Surgeon: Khushbu Robbins MD;  [facility] OR;  Service: Urology;  Laterality: N/A;    DILATION AND CURETTAGE OF UTERUS      EYE SURGERY      skin cancer bilateral arms  08/2019    Dr Soler skin cancer both arms     TUBAL LIGATION           Tobacco History:  reports that she has never smoked. She has never been exposed to tobacco smoke. She has never used smokeless tobacco.      Review of patient's allergies indicates:   Allergen Reactions    Montelukast Other (See Comments)     Depleted sodium     Ciprofloxacin     Hydrochlorothiazide Other (See Comments)     Low sodium    Hydroxyzine hcl Other (See Comments)     Patient reports upset stomach and difficulty sleeping while taking this medication and would like it to be placed on her allergy list so she does not receive this medication again     Current Medications[1]           Objective:      Vitals:    07/28/25 1017   BP: (!) 152/80   Pulse: 92   Resp: 20   Temp: 98 °F (36.7 °C)   TempSrc: Oral   Weight: 56.6 kg (124 lb 12.5 oz)   Height: 5' 1.5" (1.562 m)     Physical Exam  Constitutional:       Appearance: Normal appearance.   Cardiovascular:      Rate and Rhythm: Normal rate.      Heart sounds: Normal heart sounds.   Musculoskeletal:         General: Normal range of motion.      Cervical back: Normal range of motion.   Neurological:      Mental " Status: She is alert and oriented to person, place, and time. Mental status is at baseline.   Psychiatric:         Mood and Affect: Mood normal.           Assessment:       1. Mixed hyperlipidemia    2. Essential hypertension    3. Chronic atrial fibrillation    4. Chronic rhinitis    5. Acquired hypothyroidism           Plan:       Assessment & Plan    E03.9 Hypothyroidism, unspecified  I10 Essential (primary) hypertension  M25.561 Pain in right knee  L29.9 Pruritus, unspecified  R53.83 Other fatigue  Z82.49 Family history of ischemic heart disease and other diseases of the circulatory system    HYPOTHYROIDISM:  - Evaluated thyroid function as a potential cause of fatigue.  - Ordered labs to monitor thyroid condition.  - Continued thyroid medication at current dose and instructed patient to maintain regular medication schedule.    HYPERTENSION:  - Blood pressure control appears stable today with readings within acceptable range.  - Continued antihypertensive medication at current dose.    RIGHT KNEE PAIN:  - Ms. Lang reports occasional pain and swelling in the right knee.  - Examination today shows no signs of swelling.    SCALP PRURITUS:  - Scalp condition appears to be improving with treatment.  - Recommend continued use of medicated shampoo for management of scalp pruritus.    FATIGUE:  - Assessed patient's reports of fatigue and tiredness, evaluating potential correlation with thyroid condition.  - Ordered labs to assess other relevant parameters.  - Advised patient to continue managing fatigue with adequate rest periods as needed.    CARDIOVASCULAR HEALTH:  - Ms. Lang has an established relationship with cardiologist, Dr. Gautam, with upcoming cardiology appointment scheduled for August.    GENERAL FOLLOW-UP:  - Reviewed chart and current medications.  - Overall health status appears good with patient reporting good appetite and regular bowel movements.  - Follow up after labs are completed.  - Ms.  Rickey advised to contact the office if any needs arise before next appointment.         Mixed hyperlipidemia  -     LIPID PANEL; Future; Expected date: 07/28/2025    Essential hypertension  -     amLODIPine (NORVASC) 2.5 MG tablet; Take 1 tablet (2.5 mg total) by mouth 2 (two) times daily.  Dispense: 180 tablet; Refill: 1    Chronic atrial fibrillation  -     apixaban (ELIQUIS) 2.5 mg Tab; Take 1 tablet (2.5 mg total) by mouth 2 (two) times daily.  Dispense: 180 tablet; Refill: 0    Chronic rhinitis  -     azelastine (ASTELIN) 137 mcg (0.1 %) nasal spray; 1 spray (137 mcg total) by Nasal route 2 (two) times daily as needed for Rhinitis.  Dispense: 30 mL; Refill: 11    Acquired hypothyroidism  -     levothyroxine (SYNTHROID) 112 MCG tablet; Take 0.5 tablets (56 mcg total) by mouth before breakfast.  Dispense: 45 tablet; Refill: 3  -     TSH; Future; Expected date: 07/28/2025  -     T4, Free; Future; Expected date: 07/28/2025      Follow up in about 6 months (around 1/28/2026).        7/31/2025 Juanita Carlson NP    This note was generated with the assistance of ambient listening technology. Verbal consent was obtained by the patient and accompanying visitor(s) for the recording of patient appointment to facilitate this note. I attest to having reviewed and edited the generated note for accuracy, though some syntax or spelling errors may persist. Please contact the author of this note for any clarification.             [1]   Current Outpatient Medications:     aspirin (ECOTRIN) 81 MG EC tablet, Take 1 tablet by mouth every morning., Disp: , Rfl:     atenoloL (TENORMIN) 50 MG tablet, TAKE ONE TABLET BY MOUTH TWO TIMES A DAY, Disp: 60 tablet, Rfl: 5    estradioL (ESTRACE) 0.01 % (0.1 mg/gram) vaginal cream, Place 2 g vaginally twice a week. Place into vagina daily for two weeks and then spread out to twice a week., Disp: 42.5 g, Rfl: 4    fluticasone propionate (FLONASE) 50 mcg/actuation nasal spray, USE TWO  SPRAYS IN EACH NOSTRIL ONCE A DAY, Disp: 48 g, Rfl: 1    guaiFENesin (MUCINEX) 600 mg 12 hr tablet, Take 1,200 mg by mouth 2 (two) times daily., Disp: , Rfl:     Lactobacillus rhamnosus GG (CULTURELLE) 10 billion cell capsule, Take 1 capsule by mouth once daily., Disp: , Rfl:     levocetirizine (XYZAL) 5 MG tablet, TAKE ONE TABLET BY MOUTH EVERY EVENING, Disp: 90 tablet, Rfl: 3    lisinopriL (PRINIVIL,ZESTRIL) 40 MG tablet, Take 1 tablet (40 mg total) by mouth once daily., Disp: 90 tablet, Rfl: 3    multivitamin (THERAGRAN) per tablet, Take 1 tablet by mouth daily as needed. , Disp: , Rfl:     omeprazole (PRILOSEC) 20 MG capsule, Take 1 capsule by mouth once daily., Disp: , Rfl:     simvastatin (ZOCOR) 20 MG tablet, Take 1 tablet (20 mg total) by mouth every evening., Disp: 60 tablet, Rfl: 0    triamcinolone acetonide 0.025% (KENALOG) 0.025 % cream, Apply topically 2 (two) times daily., Disp: , Rfl:     VIT C/VIT E/LUTEIN/MIN/OMEGA-3 (OCUVITE ORAL), Take 1 tablet by mouth once daily., Disp: , Rfl:     vitamin E 400 UNIT capsule, Take 400 Units by mouth once daily., Disp: , Rfl:     amLODIPine (NORVASC) 2.5 MG tablet, Take 1 tablet (2.5 mg total) by mouth 2 (two) times daily., Disp: 180 tablet, Rfl: 1    apixaban (ELIQUIS) 2.5 mg Tab, Take 1 tablet (2.5 mg total) by mouth 2 (two) times daily., Disp: 180 tablet, Rfl: 0    azelastine (ASTELIN) 137 mcg (0.1 %) nasal spray, 1 spray (137 mcg total) by Nasal route 2 (two) times daily as needed for Rhinitis., Disp: 30 mL, Rfl: 11    ketoconazole (NIZORAL) 2 % shampoo, Apply topically twice a week. for 14 days, Disp: 120 mL, Rfl: 0    levothyroxine (SYNTHROID) 112 MCG tablet, Take 0.5 tablets (56 mcg total) by mouth before breakfast., Disp: 45 tablet, Rfl: 3    valACYclovir (VALTREX) 1000 MG tablet, Take 1 tablet (1,000 mg total) by mouth 3 (three) times daily. for 7 days, Disp: 21 tablet, Rfl: 0